# Patient Record
Sex: FEMALE | Race: WHITE | NOT HISPANIC OR LATINO | Employment: OTHER | URBAN - NONMETROPOLITAN AREA
[De-identification: names, ages, dates, MRNs, and addresses within clinical notes are randomized per-mention and may not be internally consistent; named-entity substitution may affect disease eponyms.]

---

## 2017-08-15 RX ORDER — LORAZEPAM 1 MG/1
1 TABLET ORAL EVERY 8 HOURS PRN
COMMUNITY

## 2017-08-16 ENCOUNTER — OFFICE VISIT (OUTPATIENT)
Dept: GASTROENTEROLOGY | Facility: CLINIC | Age: 67
End: 2017-08-16

## 2017-08-16 VITALS
HEART RATE: 85 BPM | HEIGHT: 62 IN | TEMPERATURE: 98.8 F | SYSTOLIC BLOOD PRESSURE: 140 MMHG | WEIGHT: 160 LBS | BODY MASS INDEX: 29.44 KG/M2 | DIASTOLIC BLOOD PRESSURE: 84 MMHG

## 2017-08-16 DIAGNOSIS — Z80.0 FAMILY HISTORY OF COLON CANCER: ICD-10-CM

## 2017-08-16 DIAGNOSIS — K62.5 RECTAL BLEEDING: Primary | ICD-10-CM

## 2017-08-16 DIAGNOSIS — K63.5 COLON POLYPS: ICD-10-CM

## 2017-08-16 PROCEDURE — 99214 OFFICE O/P EST MOD 30 MIN: CPT | Performed by: NURSE PRACTITIONER

## 2017-08-16 RX ORDER — HYDROCODONE BITARTRATE AND ACETAMINOPHEN 7.5; 325 MG/1; MG/1
1 TABLET ORAL EVERY 6 HOURS PRN
COMMUNITY
End: 2021-03-26

## 2017-08-16 RX ORDER — CYCLOBENZAPRINE HCL 10 MG
10 TABLET ORAL 3 TIMES DAILY PRN
COMMUNITY

## 2017-08-16 RX ORDER — LISINOPRIL AND HYDROCHLOROTHIAZIDE 12.5; 1 MG/1; MG/1
1 TABLET ORAL EVERY MORNING
COMMUNITY
End: 2021-03-26

## 2017-08-16 RX ORDER — TRAMADOL HYDROCHLORIDE 50 MG/1
50 TABLET ORAL EVERY 6 HOURS PRN
COMMUNITY

## 2017-08-16 NOTE — PROGRESS NOTES
Chief Complaint   Patient presents with   • Rectal Bleeding     had rectal bleeding a few times       Subjective     HPI    Bright red blood observed on toilet paper 3 times over past year.  First time she was evaluated at [facility] in Aug 2016 (review of ER records indicate hgb of 8.3).  Most recently she observed bleeding 2 times over past month.  More diarrhea since cholecystectomy within past four to five years.  She denies  diarrhea or constipation.  No abdominal pain.      Positive family hx of CRC in paternal aunt and uncle (dx age of late 70s and 62)    Review of previous records:  Hx of appendectomy with wedge resection of cecum.  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  She underwent sigmoid resection in 2007 for mass and wide based polyp all showing adenomatous changes.  The sigmoid mass showed foci of high grade dysplasia.    History reviewed. No pertinent past medical history.    Past Surgical History:   Procedure Laterality Date   • CERVICAL LAMINECTOMY     • CHOLECYSTECTOMY     • COLONOSCOPY  10/01/2014    polyps 4/  diverticulosis/  internal hemorrhoids   • HYSTERECTOMY     • SIGMOIDOSCOPY      resection   • TUBAL ABDOMINAL LIGATION         Outpatient Prescriptions Marked as Taking for the 8/16/17 encounter (Office Visit) with NORBERT Parry   Medication Sig Dispense Refill   • cyclobenzaprine (FLEXERIL) 10 MG tablet Take 10 mg by mouth 3 (Three) Times a Day As Needed for Muscle Spasms.     • Diphenhydramine-APAP, sleep, (PAIN RELIEVER PM PO) Take  by mouth.     • HYDROcodone-acetaminophen (NORCO) 7.5-325 MG per tablet Take 1 tablet by mouth Every 6 (Six) Hours As Needed for Moderate Pain .     • lisinopril-hydrochlorothiazide (PRINZIDE,ZESTORETIC) 10-12.5 MG per tablet Take 1 tablet by mouth Daily.     • LORazepam (ATIVAN) 1 MG tablet Take 1 mg by mouth Every 8 (Eight) Hours As Needed for Anxiety.     • traMADol (ULTRAM) 50 MG tablet Take 50 mg by mouth Every 6  "(Six) Hours As Needed for Moderate Pain .         Allergies   Allergen Reactions   • Codeine        Social History     Social History   • Marital status:      Spouse name: N/A   • Number of children: N/A   • Years of education: N/A     Occupational History   • Not on file.     Social History Main Topics   • Smoking status: Current Every Day Smoker     Packs/day: 2.00     Years: 55.00   • Smokeless tobacco: Never Used   • Alcohol use No   • Drug use: No   • Sexual activity: Not on file     Other Topics Concern   • Not on file     Social History Narrative       Family History   Problem Relation Age of Onset   • Colon cancer Paternal Aunt    • Colon cancer Paternal Uncle    • Esophageal cancer Neg Hx        Review of Systems   Constitutional: Negative for fatigue, fever and unexpected weight change.   HENT: Negative for hearing loss, sore throat and voice change.    Eyes: Negative for visual disturbance.   Respiratory: Negative for cough, shortness of breath and wheezing.    Cardiovascular: Negative for chest pain and palpitations.   Gastrointestinal: Positive for blood in stool. Negative for abdominal pain and vomiting.   Endocrine: Negative for polydipsia and polyuria.   Genitourinary: Negative for difficulty urinating, dysuria, hematuria and urgency.   Musculoskeletal: Negative for joint swelling and myalgias.   Skin: Negative for color change, rash and wound.   Neurological: Negative for dizziness, tremors, seizures and syncope.   Hematological: Does not bruise/bleed easily.   Psychiatric/Behavioral: Negative for agitation and confusion. The patient is not nervous/anxious.        Objective     Vitals:    08/16/17 0821   BP: 140/84   Pulse: 85   Temp: 98.8 °F (37.1 °C)   Weight: 160 lb (72.6 kg)   Height: 62\" (157.5 cm)     Body mass index is 29.26 kg/(m^2).    Physical Exam   Constitutional: She is oriented to person, place, and time. She appears well-developed and well-nourished.   HENT:   Head: " Normocephalic and atraumatic.   Eyes: Conjunctivae are normal. Pupils are equal, round, and reactive to light. No scleral icterus.   Neck: No JVD present. No thyroid mass and no thyromegaly present.   Cardiovascular: Normal rate, regular rhythm and normal heart sounds.  Exam reveals no gallop and no friction rub.    No murmur heard.  Pulmonary/Chest: Effort normal and breath sounds normal. No accessory muscle usage. No respiratory distress. She has no wheezes. She has no rales.   Abdominal: Soft. Bowel sounds are normal. She exhibits no distension, no ascites and no mass. There is no splenomegaly or hepatomegaly. There is no tenderness. There is no rebound and no guarding.   Genitourinary:   Genitourinary Comments: Rectal-Did not examine   Musculoskeletal: Normal range of motion. She exhibits no edema.   Neurological: She is alert and oriented to person, place, and time.   Deemed a reliable historian, able to converse without difficulty and able to move all extremities without difficulty   Skin: Skin is warm and dry.   Psychiatric: She has a normal mood and affect. Her behavior is normal.       Imaging Results (most recent)     None          Assessment/Plan     Shalini was seen today for rectal bleeding.    Diagnoses and all orders for this visit:    Rectal bleeding  -     Case Request; Standing  -     Case Request  -     Cancel: Case Request; Standing  -     Implement Anesthesia Orders Day of Procedure; Standing  -     Obtain Informed Consent; Standing  -     Cancel: Case Request    Colon polyps    Family history of colon cancer    Other orders  -     polyethylene glycol (GoLYTELY) 236 g solution; Take 3,785 mL by mouth 1 (One) Time for 1 dose. Take as directed  -     Implement Anesthesia Orders Day of Procedure; Standing  -     Obtain Informed Consent; Standing        COLONOSCOPY WITH ANESTHESIA (N/A)     Advised pt to stop ASA day prior to procedure and to stop use of NSAIDs, Fish Oil, and MV 5 days prior to  procedure.  Tylenol based products are ok to take.  Pt verbalized understanding.    All risks, benefits, alternatives, and indications of colonoscopy procedure have been discussed with the patient. Risks to include perforation of the colon requiring possible surgery or colostomy, risk of bleeding from biopsies or removal of colon tissue, possibility of missing a colon polyp or cancer, or adverse drug reaction.  Benefits to include the diagnosis and management of disease of the colon and rectum. Alternatives to include barium enema, radiographic evaluation, lab testing or no intervention. Pt verbalizes understanding and agrees to proceed with procedure.    During this visit I spent 3 minutes counseling patient on the importance of smoking cessation.  I advised the patient of the risks involved with the continued use of tobacco and provided the patient with smoking cessation education materials.        Patient Instructions   Steps to Quit Smoking   Smoking tobacco can be harmful to your health and can affect almost every organ in your body. Smoking puts you, and those around you, at risk for developing many serious chronic diseases. Quitting smoking is difficult, but it is one of the best things that you can do for your health. It is never too late to quit.  WHAT ARE THE BENEFITS OF QUITTING SMOKING?  When you quit smoking, you lower your risk of developing serious diseases and conditions, such as:  · Lung cancer or lung disease, such as COPD.  · Heart disease.  · Stroke.  · Heart attack.  · Infertility.  · Osteoporosis and bone fractures.  Additionally, symptoms such as coughing, wheezing, and shortness of breath may get better when you quit. You may also find that you get sick less often because your body is stronger at fighting off colds and infections. If you are pregnant, quitting smoking can help to reduce your chances of having a baby of low birth weight.  HOW DO I GET READY TO QUIT?  When you decide to quit  "smoking, create a plan to make sure that you are successful. Before you quit:  · Pick a date to quit. Set a date within the next two weeks to give you time to prepare.  · Write down the reasons why you are quitting. Keep this list in places where you will see it often, such as on your bathroom mirror or in your car or wallet.  · Identify the people, places, things, and activities that make you want to smoke (triggers) and avoid them. Make sure to take these actions:    Throw away all cigarettes at home, at work, and in your car.    Throw away smoking accessories, such as ashtrays and lighters.    Clean your car and make sure to empty the ashtray.    Clean your home, including curtains and carpets.  · Tell your family, friends, and coworkers that you are quitting. Support from your loved ones can make quitting easier.  · Talk with your health care provider about your options for quitting smoking.  · Find out what treatment options are covered by your health insurance.  WHAT STRATEGIES CAN I USE TO QUIT SMOKING?   Talk with your healthcare provider about different strategies to quit smoking. Some strategies include:  · Quitting smoking altogether instead of gradually lessening how much you smoke over a period of time. Research shows that quitting \"cold turkey\" is more successful than gradually quitting.  · Attending in-person counseling to help you build problem-solving skills. You are more likely to have success in quitting if you attend several counseling sessions. Even short sessions of 10 minutes can be effective.  · Finding resources and support systems that can help you to quit smoking and remain smoke-free after you quit. These resources are most helpful when you use them often. They can include:    Online chats with a counselor.    Telephone quitlines.    Printed self-help materials.    Support groups or group counseling.    Text messaging programs.    Mobile phone applications.  · Taking medicines to help you " quit smoking. (If you are pregnant or breastfeeding, talk with your health care provider first.) Some medicines contain nicotine and some do not. Both types of medicines help with cravings, but the medicines that include nicotine help to relieve withdrawal symptoms. Your health care provider may recommend:    Nicotine patches, gum, or lozenges.    Nicotine inhalers or sprays.    Non-nicotine medicine that is taken by mouth.  Talk with your health care provider about combining strategies, such as taking medicines while you are also receiving in-person counseling. Using these two strategies together makes you more likely to succeed in quitting than if you used either strategy on its own.  If you are pregnant or breastfeeding, talk with your health care provider about finding counseling or other support strategies to quit smoking. Do not take medicine to help you quit smoking unless told to do so by your health care provider.  WHAT THINGS CAN I DO TO MAKE IT EASIER TO QUIT?  Quitting smoking might feel overwhelming at first, but there is a lot that you can do to make it easier. Take these important actions:  · Reach out to your family and friends and ask that they support and encourage you during this time. Call telephone quitlines, reach out to support groups, or work with a counselor for support.  · Ask people who smoke to avoid smoking around you.  · Avoid places that trigger you to smoke, such as bars, parties, or smoke-break areas at work.  · Spend time around people who do not smoke.  · Lessen stress in your life, because stress can be a smoking trigger for some people. To lessen stress, try:    Exercising regularly.    Deep-breathing exercises.    Yoga.    Meditating.    Performing a body scan. This involves closing your eyes, scanning your body from head to toe, and noticing which parts of your body are particularly tense. Purposefully relax the muscles in those areas.  · Download or purchase mobile phone or  tablet apps (applications) that can help you stick to your quit plan by providing reminders, tips, and encouragement. There are many free apps, such as QuitGuide from the CDC (Centers for Disease Control and Prevention). You can find other support for quitting smoking (smoking cessation) through smokefree.gov and other websites.  HOW WILL I FEEL WHEN I QUIT SMOKING?  Within the first 24 hours of quitting smoking, you may start to feel some withdrawal symptoms. These symptoms are usually most noticeable 2-3 days after quitting, but they usually do not last beyond 2-3 weeks. Changes or symptoms that you might experience include:  · Mood swings.  · Restlessness, anxiety, or irritation.  · Difficulty concentrating.  · Dizziness.  · Strong cravings for sugary foods in addition to nicotine.  · Mild weight gain.  · Constipation.  · Nausea.  · Coughing or a sore throat.  · Changes in how your medicines work in your body.  · A depressed mood.  · Difficulty sleeping (insomnia).  After the first 2-3 weeks of quitting, you may start to notice more positive results, such as:  · Improved sense of smell and taste.  · Decreased coughing and sore throat.  · Slower heart rate.  · Lower blood pressure.  · Clearer skin.  · The ability to breathe more easily.  · Fewer sick days.  Quitting smoking is very challenging for most people. Do not get discouraged if you are not successful the first time. Some people need to make many attempts to quit before they achieve long-term success. Do your best to stick to your quit plan, and talk with your health care provider if you have any questions or concerns.     This information is not intended to replace advice given to you by your health care provider. Make sure you discuss any questions you have with your health care provider.     Document Released: 12/12/2002 Document Revised: 05/03/2016 Document Reviewed: 05/03/2016  [employer] Interactive Patient Education ©2017 [employer]

## 2017-08-16 NOTE — PATIENT INSTRUCTIONS
Steps to Quit Smoking   Smoking tobacco can be harmful to your health and can affect almost every organ in your body. Smoking puts you, and those around you, at risk for developing many serious chronic diseases. Quitting smoking is difficult, but it is one of the best things that you can do for your health. It is never too late to quit.  WHAT ARE THE BENEFITS OF QUITTING SMOKING?  When you quit smoking, you lower your risk of developing serious diseases and conditions, such as:  · Lung cancer or lung disease, such as COPD.  · Heart disease.  · Stroke.  · Heart attack.  · Infertility.  · Osteoporosis and bone fractures.  Additionally, symptoms such as coughing, wheezing, and shortness of breath may get better when you quit. You may also find that you get sick less often because your body is stronger at fighting off colds and infections. If you are pregnant, quitting smoking can help to reduce your chances of having a baby of low birth weight.  HOW DO I GET READY TO QUIT?  When you decide to quit smoking, create a plan to make sure that you are successful. Before you quit:  · Pick a date to quit. Set a date within the next two weeks to give you time to prepare.  · Write down the reasons why you are quitting. Keep this list in places where you will see it often, such as on your bathroom mirror or in your car or wallet.  · Identify the people, places, things, and activities that make you want to smoke (triggers) and avoid them. Make sure to take these actions:    Throw away all cigarettes at home, at work, and in your car.    Throw away smoking accessories, such as ashtrays and lighters.    Clean your car and make sure to empty the ashtray.    Clean your home, including curtains and carpets.  · Tell your family, friends, and coworkers that you are quitting. Support from your loved ones can make quitting easier.  · Talk with your health care provider about your options for quitting smoking.  · Find out what treatment  "options are covered by your health insurance.  WHAT STRATEGIES CAN I USE TO QUIT SMOKING?   Talk with your healthcare provider about different strategies to quit smoking. Some strategies include:  · Quitting smoking altogether instead of gradually lessening how much you smoke over a period of time. Research shows that quitting \"cold turkey\" is more successful than gradually quitting.  · Attending in-person counseling to help you build problem-solving skills. You are more likely to have success in quitting if you attend several counseling sessions. Even short sessions of 10 minutes can be effective.  · Finding resources and support systems that can help you to quit smoking and remain smoke-free after you quit. These resources are most helpful when you use them often. They can include:    Online chats with a counselor.    Telephone quitlines.    Printed self-help materials.    Support groups or group counseling.    Text messaging programs.    Mobile phone applications.  · Taking medicines to help you quit smoking. (If you are pregnant or breastfeeding, talk with your health care provider first.) Some medicines contain nicotine and some do not. Both types of medicines help with cravings, but the medicines that include nicotine help to relieve withdrawal symptoms. Your health care provider may recommend:    Nicotine patches, gum, or lozenges.    Nicotine inhalers or sprays.    Non-nicotine medicine that is taken by mouth.  Talk with your health care provider about combining strategies, such as taking medicines while you are also receiving in-person counseling. Using these two strategies together makes you more likely to succeed in quitting than if you used either strategy on its own.  If you are pregnant or breastfeeding, talk with your health care provider about finding counseling or other support strategies to quit smoking. Do not take medicine to help you quit smoking unless told to do so by your health care " provider.  WHAT THINGS CAN I DO TO MAKE IT EASIER TO QUIT?  Quitting smoking might feel overwhelming at first, but there is a lot that you can do to make it easier. Take these important actions:  · Reach out to your family and friends and ask that they support and encourage you during this time. Call telephone quitlines, reach out to support groups, or work with a counselor for support.  · Ask people who smoke to avoid smoking around you.  · Avoid places that trigger you to smoke, such as bars, parties, or smoke-break areas at work.  · Spend time around people who do not smoke.  · Lessen stress in your life, because stress can be a smoking trigger for some people. To lessen stress, try:    Exercising regularly.    Deep-breathing exercises.    Yoga.    Meditating.    Performing a body scan. This involves closing your eyes, scanning your body from head to toe, and noticing which parts of your body are particularly tense. Purposefully relax the muscles in those areas.  · Download or purchase mobile phone or tablet apps (applications) that can help you stick to your quit plan by providing reminders, tips, and encouragement. There are many free apps, such as QuitGuide from the CDC (Centers for Disease Control and Prevention). You can find other support for quitting smoking (smoking cessation) through smokefree.gov and other websites.  HOW WILL I FEEL WHEN I QUIT SMOKING?  Within the first 24 hours of quitting smoking, you may start to feel some withdrawal symptoms. These symptoms are usually most noticeable 2-3 days after quitting, but they usually do not last beyond 2-3 weeks. Changes or symptoms that you might experience include:  · Mood swings.  · Restlessness, anxiety, or irritation.  · Difficulty concentrating.  · Dizziness.  · Strong cravings for sugary foods in addition to nicotine.  · Mild weight gain.  · Constipation.  · Nausea.  · Coughing or a sore throat.  · Changes in how your medicines work in your  body.  · A depressed mood.  · Difficulty sleeping (insomnia).  After the first 2-3 weeks of quitting, you may start to notice more positive results, such as:  · Improved sense of smell and taste.  · Decreased coughing and sore throat.  · Slower heart rate.  · Lower blood pressure.  · Clearer skin.  · The ability to breathe more easily.  · Fewer sick days.  Quitting smoking is very challenging for most people. Do not get discouraged if you are not successful the first time. Some people need to make many attempts to quit before they achieve long-term success. Do your best to stick to your quit plan, and talk with your health care provider if you have any questions or concerns.     This information is not intended to replace advice given to you by your health care provider. Make sure you discuss any questions you have with your health care provider.     Document Released: 12/12/2002 Document Revised: 05/03/2016 Document Reviewed: 05/03/2016  [employer] Interactive Patient Education ©2017 [employer]

## 2017-09-07 ENCOUNTER — TELEPHONE (OUTPATIENT)
Dept: GASTROENTEROLOGY | Facility: CLINIC | Age: 67
End: 2017-09-07

## 2017-09-07 ENCOUNTER — HOSPITAL ENCOUNTER (OUTPATIENT)
Facility: HOSPITAL | Age: 67
Setting detail: HOSPITAL OUTPATIENT SURGERY
Discharge: HOME | End: 2017-09-07
Attending: INTERNAL MEDICINE | Admitting: INTERNAL MEDICINE

## 2017-09-07 ENCOUNTER — ANESTHESIA EVENT (OUTPATIENT)
Dept: GASTROENTEROLOGY | Facility: HOSPITAL | Age: 67
End: 2017-09-07

## 2017-09-07 ENCOUNTER — ANESTHESIA (OUTPATIENT)
Dept: GASTROENTEROLOGY | Facility: HOSPITAL | Age: 67
End: 2017-09-07

## 2017-09-07 VITALS
OXYGEN SATURATION: 99 % | WEIGHT: 157 LBS | TEMPERATURE: 98.3 F | SYSTOLIC BLOOD PRESSURE: 145 MMHG | DIASTOLIC BLOOD PRESSURE: 72 MMHG | HEART RATE: 93 BPM | RESPIRATION RATE: 16 BRPM | BODY MASS INDEX: 28.89 KG/M2 | HEIGHT: 62 IN

## 2017-09-07 PROCEDURE — 45385 COLONOSCOPY W/LESION REMOVAL: CPT | Performed by: INTERNAL MEDICINE

## 2017-09-07 PROCEDURE — 25010000002 PROPOFOL 10 MG/ML EMULSION: Performed by: NURSE ANESTHETIST, CERTIFIED REGISTERED

## 2017-09-07 RX ORDER — PROPOFOL 10 MG/ML
VIAL (ML) INTRAVENOUS AS NEEDED
Status: DISCONTINUED | OUTPATIENT
Start: 2017-09-07 | End: 2017-09-07 | Stop reason: SURG

## 2017-09-07 RX ORDER — SODIUM CHLORIDE 9 MG/ML
500 INJECTION, SOLUTION INTRAVENOUS CONTINUOUS PRN
Status: DISCONTINUED | OUTPATIENT
Start: 2017-09-07 | End: 2017-09-07 | Stop reason: HOSPADM

## 2017-09-07 RX ORDER — SODIUM CHLORIDE 0.9 % (FLUSH) 0.9 %
3 SYRINGE (ML) INJECTION AS NEEDED
Status: DISCONTINUED | OUTPATIENT
Start: 2017-09-07 | End: 2017-09-07 | Stop reason: HOSPADM

## 2017-09-07 RX ADMIN — SODIUM CHLORIDE 500 ML: 9 INJECTION, SOLUTION INTRAVENOUS at 10:49

## 2017-09-07 RX ADMIN — PROPOFOL 50 MG: 10 INJECTION, EMULSION INTRAVENOUS at 11:53

## 2017-09-07 RX ADMIN — PROPOFOL 50 MG: 10 INJECTION, EMULSION INTRAVENOUS at 11:44

## 2017-09-07 RX ADMIN — PROPOFOL 50 MG: 10 INJECTION, EMULSION INTRAVENOUS at 11:48

## 2017-09-07 RX ADMIN — PROPOFOL 20 MG: 10 INJECTION, EMULSION INTRAVENOUS at 11:49

## 2017-09-07 RX ADMIN — PROPOFOL 50 MG: 10 INJECTION, EMULSION INTRAVENOUS at 11:46

## 2017-09-07 RX ADMIN — PROPOFOL 30 MG: 10 INJECTION, EMULSION INTRAVENOUS at 11:45

## 2017-09-07 NOTE — ANESTHESIA PREPROCEDURE EVALUATION
Anesthesia Evaluation     Patient summary reviewed   history of anesthetic complications: PONV         Airway   Mallampati: I  TM distance: >3 FB  Neck ROM: full  no difficulty expected  Dental    (+) edentulous    Pulmonary    (+) a smoker (1.5 ppd, yes today),   Cardiovascular   Exercise tolerance: (Limited bynkne pain, denies chest pain and shortness of breath)    (+) hypertension,       Neuro/Psych  (+) psychiatric history Anxiety,    (-) seizures, TIA, CVA    ROS Comment: [place] palsy  GI/Hepatic/Renal/Endo    (-) liver disease, no renal disease, diabetes    Musculoskeletal     Abdominal    Substance History      OB/GYN          Other        ROS/Med Hx Other: Rectal bleeding                                  Anesthesia Plan    ASA 3     general   total IV anesthesia  intravenous induction   Anesthetic plan and risks discussed with patient.

## 2017-09-07 NOTE — H&P (VIEW-ONLY)
Chief Complaint   Patient presents with   • Rectal Bleeding     had rectal bleeding a few times       Subjective     HPI    Bright red blood observed on toilet paper 3 times over past year.  First time she was evaluated at [facility] in Aug 2016 (review of ER records indicate hgb of 8.3).  Most recently she observed bleeding 2 times over past month.  More diarrhea since cholecystectomy within past four to five years.  She denies  diarrhea or constipation.  No abdominal pain.      Positive family hx of CRC in paternal aunt and uncle (dx age of late 70s and 62)    Review of previous records:  Hx of appendectomy with wedge resection of cecum.  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  She underwent sigmoid resection in 2007 for mass and wide based polyp all showing adenomatous changes.  The sigmoid mass showed foci of high grade dysplasia.    History reviewed. No pertinent past medical history.    Past Surgical History:   Procedure Laterality Date   • CERVICAL LAMINECTOMY     • CHOLECYSTECTOMY     • COLONOSCOPY  10/01/2014    polyps 4/  diverticulosis/  internal hemorrhoids   • HYSTERECTOMY     • SIGMOIDOSCOPY      resection   • TUBAL ABDOMINAL LIGATION         Outpatient Prescriptions Marked as Taking for the 8/16/17 encounter (Office Visit) with NORBERT Parry   Medication Sig Dispense Refill   • cyclobenzaprine (FLEXERIL) 10 MG tablet Take 10 mg by mouth 3 (Three) Times a Day As Needed for Muscle Spasms.     • Diphenhydramine-APAP, sleep, (PAIN RELIEVER PM PO) Take  by mouth.     • HYDROcodone-acetaminophen (NORCO) 7.5-325 MG per tablet Take 1 tablet by mouth Every 6 (Six) Hours As Needed for Moderate Pain .     • lisinopril-hydrochlorothiazide (PRINZIDE,ZESTORETIC) 10-12.5 MG per tablet Take 1 tablet by mouth Daily.     • LORazepam (ATIVAN) 1 MG tablet Take 1 mg by mouth Every 8 (Eight) Hours As Needed for Anxiety.     • traMADol (ULTRAM) 50 MG tablet Take 50 mg by mouth Every 6  "(Six) Hours As Needed for Moderate Pain .         Allergies   Allergen Reactions   • Codeine        Social History     Social History   • Marital status:      Spouse name: N/A   • Number of children: N/A   • Years of education: N/A     Occupational History   • Not on file.     Social History Main Topics   • Smoking status: Current Every Day Smoker     Packs/day: 2.00     Years: 55.00   • Smokeless tobacco: Never Used   • Alcohol use No   • Drug use: No   • Sexual activity: Not on file     Other Topics Concern   • Not on file     Social History Narrative       Family History   Problem Relation Age of Onset   • Colon cancer Paternal Aunt    • Colon cancer Paternal Uncle    • Esophageal cancer Neg Hx        Review of Systems   Constitutional: Negative for fatigue, fever and unexpected weight change.   HENT: Negative for hearing loss, sore throat and voice change.    Eyes: Negative for visual disturbance.   Respiratory: Negative for cough, shortness of breath and wheezing.    Cardiovascular: Negative for chest pain and palpitations.   Gastrointestinal: Positive for blood in stool. Negative for abdominal pain and vomiting.   Endocrine: Negative for polydipsia and polyuria.   Genitourinary: Negative for difficulty urinating, dysuria, hematuria and urgency.   Musculoskeletal: Negative for joint swelling and myalgias.   Skin: Negative for color change, rash and wound.   Neurological: Negative for dizziness, tremors, seizures and syncope.   Hematological: Does not bruise/bleed easily.   Psychiatric/Behavioral: Negative for agitation and confusion. The patient is not nervous/anxious.        Objective     Vitals:    08/16/17 0821   BP: 140/84   Pulse: 85   Temp: 98.8 °F (37.1 °C)   Weight: 160 lb (72.6 kg)   Height: 62\" (157.5 cm)     Body mass index is 29.26 kg/(m^2).    Physical Exam   Constitutional: She is oriented to person, place, and time. She appears well-developed and well-nourished.   HENT:   Head: " Normocephalic and atraumatic.   Eyes: Conjunctivae are normal. Pupils are equal, round, and reactive to light. No scleral icterus.   Neck: No JVD present. No thyroid mass and no thyromegaly present.   Cardiovascular: Normal rate, regular rhythm and normal heart sounds.  Exam reveals no gallop and no friction rub.    No murmur heard.  Pulmonary/Chest: Effort normal and breath sounds normal. No accessory muscle usage. No respiratory distress. She has no wheezes. She has no rales.   Abdominal: Soft. Bowel sounds are normal. She exhibits no distension, no ascites and no mass. There is no splenomegaly or hepatomegaly. There is no tenderness. There is no rebound and no guarding.   Genitourinary:   Genitourinary Comments: Rectal-Did not examine   Musculoskeletal: Normal range of motion. She exhibits no edema.   Neurological: She is alert and oriented to person, place, and time.   Deemed a reliable historian, able to converse without difficulty and able to move all extremities without difficulty   Skin: Skin is warm and dry.   Psychiatric: She has a normal mood and affect. Her behavior is normal.       Imaging Results (most recent)     None          Assessment/Plan     Shalini was seen today for rectal bleeding.    Diagnoses and all orders for this visit:    Rectal bleeding  -     Case Request; Standing  -     Case Request  -     Cancel: Case Request; Standing  -     Implement Anesthesia Orders Day of Procedure; Standing  -     Obtain Informed Consent; Standing  -     Cancel: Case Request    Colon polyps    Family history of colon cancer    Other orders  -     polyethylene glycol (GoLYTELY) 236 g solution; Take 3,785 mL by mouth 1 (One) Time for 1 dose. Take as directed  -     Implement Anesthesia Orders Day of Procedure; Standing  -     Obtain Informed Consent; Standing        COLONOSCOPY WITH ANESTHESIA (N/A)     Advised pt to stop ASA day prior to procedure and to stop use of NSAIDs, Fish Oil, and MV 5 days prior to  procedure.  Tylenol based products are ok to take.  Pt verbalized understanding.    All risks, benefits, alternatives, and indications of colonoscopy procedure have been discussed with the patient. Risks to include perforation of the colon requiring possible surgery or colostomy, risk of bleeding from biopsies or removal of colon tissue, possibility of missing a colon polyp or cancer, or adverse drug reaction.  Benefits to include the diagnosis and management of disease of the colon and rectum. Alternatives to include barium enema, radiographic evaluation, lab testing or no intervention. Pt verbalizes understanding and agrees to proceed with procedure.    During this visit I spent 3 minutes counseling patient on the importance of smoking cessation.  I advised the patient of the risks involved with the continued use of tobacco and provided the patient with smoking cessation education materials.        Patient Instructions   Steps to Quit Smoking   Smoking tobacco can be harmful to your health and can affect almost every organ in your body. Smoking puts you, and those around you, at risk for developing many serious chronic diseases. Quitting smoking is difficult, but it is one of the best things that you can do for your health. It is never too late to quit.  WHAT ARE THE BENEFITS OF QUITTING SMOKING?  When you quit smoking, you lower your risk of developing serious diseases and conditions, such as:  · Lung cancer or lung disease, such as COPD.  · Heart disease.  · Stroke.  · Heart attack.  · Infertility.  · Osteoporosis and bone fractures.  Additionally, symptoms such as coughing, wheezing, and shortness of breath may get better when you quit. You may also find that you get sick less often because your body is stronger at fighting off colds and infections. If you are pregnant, quitting smoking can help to reduce your chances of having a baby of low birth weight.  HOW DO I GET READY TO QUIT?  When you decide to quit  "smoking, create a plan to make sure that you are successful. Before you quit:  · Pick a date to quit. Set a date within the next two weeks to give you time to prepare.  · Write down the reasons why you are quitting. Keep this list in places where you will see it often, such as on your bathroom mirror or in your car or wallet.  · Identify the people, places, things, and activities that make you want to smoke (triggers) and avoid them. Make sure to take these actions:    Throw away all cigarettes at home, at work, and in your car.    Throw away smoking accessories, such as ashtrays and lighters.    Clean your car and make sure to empty the ashtray.    Clean your home, including curtains and carpets.  · Tell your family, friends, and coworkers that you are quitting. Support from your loved ones can make quitting easier.  · Talk with your health care provider about your options for quitting smoking.  · Find out what treatment options are covered by your health insurance.  WHAT STRATEGIES CAN I USE TO QUIT SMOKING?   Talk with your healthcare provider about different strategies to quit smoking. Some strategies include:  · Quitting smoking altogether instead of gradually lessening how much you smoke over a period of time. Research shows that quitting \"cold turkey\" is more successful than gradually quitting.  · Attending in-person counseling to help you build problem-solving skills. You are more likely to have success in quitting if you attend several counseling sessions. Even short sessions of 10 minutes can be effective.  · Finding resources and support systems that can help you to quit smoking and remain smoke-free after you quit. These resources are most helpful when you use them often. They can include:    Online chats with a counselor.    Telephone quitlines.    Printed self-help materials.    Support groups or group counseling.    Text messaging programs.    Mobile phone applications.  · Taking medicines to help you " quit smoking. (If you are pregnant or breastfeeding, talk with your health care provider first.) Some medicines contain nicotine and some do not. Both types of medicines help with cravings, but the medicines that include nicotine help to relieve withdrawal symptoms. Your health care provider may recommend:    Nicotine patches, gum, or lozenges.    Nicotine inhalers or sprays.    Non-nicotine medicine that is taken by mouth.  Talk with your health care provider about combining strategies, such as taking medicines while you are also receiving in-person counseling. Using these two strategies together makes you more likely to succeed in quitting than if you used either strategy on its own.  If you are pregnant or breastfeeding, talk with your health care provider about finding counseling or other support strategies to quit smoking. Do not take medicine to help you quit smoking unless told to do so by your health care provider.  WHAT THINGS CAN I DO TO MAKE IT EASIER TO QUIT?  Quitting smoking might feel overwhelming at first, but there is a lot that you can do to make it easier. Take these important actions:  · Reach out to your family and friends and ask that they support and encourage you during this time. Call telephone quitlines, reach out to support groups, or work with a counselor for support.  · Ask people who smoke to avoid smoking around you.  · Avoid places that trigger you to smoke, such as bars, parties, or smoke-break areas at work.  · Spend time around people who do not smoke.  · Lessen stress in your life, because stress can be a smoking trigger for some people. To lessen stress, try:    Exercising regularly.    Deep-breathing exercises.    Yoga.    Meditating.    Performing a body scan. This involves closing your eyes, scanning your body from head to toe, and noticing which parts of your body are particularly tense. Purposefully relax the muscles in those areas.  · Download or purchase mobile phone or  tablet apps (applications) that can help you stick to your quit plan by providing reminders, tips, and encouragement. There are many free apps, such as QuitGuide from the CDC (Centers for Disease Control and Prevention). You can find other support for quitting smoking (smoking cessation) through smokefree.gov and other websites.  HOW WILL I FEEL WHEN I QUIT SMOKING?  Within the first 24 hours of quitting smoking, you may start to feel some withdrawal symptoms. These symptoms are usually most noticeable 2-3 days after quitting, but they usually do not last beyond 2-3 weeks. Changes or symptoms that you might experience include:  · Mood swings.  · Restlessness, anxiety, or irritation.  · Difficulty concentrating.  · Dizziness.  · Strong cravings for sugary foods in addition to nicotine.  · Mild weight gain.  · Constipation.  · Nausea.  · Coughing or a sore throat.  · Changes in how your medicines work in your body.  · A depressed mood.  · Difficulty sleeping (insomnia).  After the first 2-3 weeks of quitting, you may start to notice more positive results, such as:  · Improved sense of smell and taste.  · Decreased coughing and sore throat.  · Slower heart rate.  · Lower blood pressure.  · Clearer skin.  · The ability to breathe more easily.  · Fewer sick days.  Quitting smoking is very challenging for most people. Do not get discouraged if you are not successful the first time. Some people need to make many attempts to quit before they achieve long-term success. Do your best to stick to your quit plan, and talk with your health care provider if you have any questions or concerns.     This information is not intended to replace advice given to you by your health care provider. Make sure you discuss any questions you have with your health care provider.     Document Released: 12/12/2002 Document Revised: 05/03/2016 Document Reviewed: 05/03/2016  [employer] Interactive Patient Education ©2017 [employer]

## 2017-09-07 NOTE — PLAN OF CARE
Problem: Patient Care Overview (Adult)  Goal: Plan of Care Review  Outcome: Ongoing (interventions implemented as appropriate)    09/07/17 1147   Patient Care Overview   Progress improving   Outcome Evaluation   Outcome Summary/Follow up Plan pt tolerating procedure well         Problem: GI Endoscopy (Adult)  Goal: Signs and Symptoms of Listed Potential Problems Will be Absent or Manageable (GI Endoscopy)  Outcome: Ongoing (interventions implemented as appropriate)    09/07/17 1147   GI Endoscopy   Problems Assessed (GI Endoscopy) all   Problems Present (GI Endoscopy) none

## 2017-09-07 NOTE — PLAN OF CARE
Problem: Patient Care Overview (Adult)  Goal: Plan of Care Review  Outcome: Outcome(s) achieved Date Met:  09/07/17 09/07/17 1213   Patient Care Overview   Progress improving   Outcome Evaluation   Outcome Summary/Follow up Plan D/C CRITERIA MET   Coping/Psychosocial Response Interventions   Plan Of Care Reviewed With patient;spouse

## 2017-09-07 NOTE — ANESTHESIA POSTPROCEDURE EVALUATION
Patient: Shalini Carty    Procedure Summary     Date Anesthesia Start Anesthesia Stop Room / Location    09/07/17 1142 1157  [facility] /  [facility]       Procedure Diagnosis Surgeon Provider    COLONOSCOPY WITH ANESTHESIA (N/A ) Rectal bleeding  (Rectal bleeding [K62.5]) Dario Cordero, DO Deep Cano, LEONARDO          Anesthesia Type: general  Last vitals  BP        Temp        Pulse       Resp        SpO2          Post Anesthesia Care and Evaluation    Patient location during evaluation: PHASE II  Patient participation: complete - patient participated  Level of consciousness: awake and alert  Pain score: 0  Pain management: adequate  Airway patency: patent  Anesthetic complications: No anesthetic complications  PONV Status: none  Cardiovascular status: acceptable and stable  Respiratory status: acceptable and unassisted  Hydration status: acceptable

## 2018-07-04 ENCOUNTER — APPOINTMENT (OUTPATIENT)
Dept: GENERAL RADIOLOGY | Age: 68
End: 2018-07-04
Payer: MEDICARE

## 2018-07-04 ENCOUNTER — HOSPITAL ENCOUNTER (EMERGENCY)
Age: 68
Discharge: HOME | End: 2018-07-04
Payer: MEDICARE

## 2018-07-04 VITALS
SYSTOLIC BLOOD PRESSURE: 100 MMHG | OXYGEN SATURATION: 98 % | HEART RATE: 84 BPM | RESPIRATION RATE: 14 BRPM | TEMPERATURE: 98.2 F | DIASTOLIC BLOOD PRESSURE: 62 MMHG

## 2018-07-04 DIAGNOSIS — M62.838 SPASM OF MUSCLE: Primary | ICD-10-CM

## 2018-07-04 PROCEDURE — 99283 EMERGENCY DEPT VISIT LOW MDM: CPT | Performed by: NURSE PRACTITIONER

## 2018-07-04 PROCEDURE — 93971 EXTREMITY STUDY: CPT

## 2018-07-04 PROCEDURE — 99283 EMERGENCY DEPT VISIT LOW MDM: CPT

## 2018-07-04 PROCEDURE — 73560 X-RAY EXAM OF KNEE 1 OR 2: CPT

## 2018-07-04 RX ORDER — HYDROCODONE BITARTRATE AND ACETAMINOPHEN 5; 325 MG/1; MG/1
1 TABLET ORAL EVERY 6 HOURS PRN
Qty: 15 TABLET | Refills: 0 | Status: SHIPPED | OUTPATIENT
Start: 2018-07-04 | End: 2018-07-11

## 2018-07-04 RX ORDER — CYCLOBENZAPRINE HCL 10 MG
10 TABLET ORAL 3 TIMES DAILY PRN
COMMUNITY
End: 2019-06-04

## 2018-07-04 RX ORDER — TRIAMCINOLONE ACETONIDE 40 MG/ML
40 INJECTION, SUSPENSION INTRA-ARTICULAR; INTRAMUSCULAR ONCE
Status: DISCONTINUED | OUTPATIENT
Start: 2018-07-04 | End: 2018-07-04

## 2018-07-04 ASSESSMENT — ENCOUNTER SYMPTOMS
SHORTNESS OF BREATH: 0
ABDOMINAL PAIN: 0

## 2018-07-04 ASSESSMENT — PAIN SCALES - GENERAL: PAINLEVEL_OUTOF10: 6

## 2018-07-04 NOTE — ED PROVIDER NOTES
[facility]  eMERGENCY dEPARTMENT eNCOUnter      Pt Name: Kellee Barry  MRN: [id number]  Fabiangfjoseph 1950  Date of evaluation: 7/4/2018  Provider: Gerhardt Reels, [address]       Chief Complaint   Patient presents with    Leg Pain     reports persistent right leg pain and burning         HISTORY OF PRESENT ILLNESS   (Location/Symptom, Timing/Onset, Context/Setting, Quality, Duration, Modifying Factors, Severity)  Note limiting factors. Kellee Barry is a 75 y.o. female who presents to the emergency department with r leg pain x months. HAd her knee replaced by Dr Erwin Gregory almost 2 years ago. Has tightness to her r thigh and calf that comes and goes. Does not know anything that causes it or relieves it. Has been going on for 2 months. Dr Josemanuel Arias gave her muscle relaxers and ultram that do not help. Also takes ativan     The history is provided by the patient. Leg Pain   This is a chronic problem. The current episode started more than 1 week ago. The problem occurs daily. The problem has been gradually worsening. Pertinent negatives include no chest pain, no abdominal pain and no shortness of breath. Nothing aggravates the symptoms. Nothing relieves the symptoms. Nursing Notes were reviewed. REVIEW OF SYSTEMS    (2-9 systems for level 4, 10 or more for level 5)     Review of Systems   Respiratory: Negative for shortness of breath. Cardiovascular: Negative for chest pain. Gastrointestinal: Negative for abdominal pain. Except as noted above the remainder of the review of systems was reviewed and negative.        PAST MEDICAL HISTORY     Past Medical History:   Diagnosis Date    Anxiety     Back pain of lumbar region with sciatica     Hyperlipidemia     Hypertension     Panic attack     PONV (postoperative nausea and vomiting)          SURGICAL HISTORY       Past Surgical History:   Procedure Laterality Date    APPENDECTOMY      BACK SURGERY      CERVICAL     BACK SURGERY      LUMBAR RUPTURED DISC    CHOLECYSTECTOMY      COLECTOMY      BLOCKAGE    HYSTERECTOMY      JOINT REPLACEMENT      KNEE ARTHROSCOPY Right     TOTAL KNEE ARTHROPLASTY Right 8/26/2016    KNEE TOTAL ARTHROPLASTY performed by Aleta Merchant DO at [address]       Previous Medications    ACETAMINOPHEN (TYLENOL) 500 MG TABLET    Take 500 mg by mouth 3 times daily Indications: PAIN    ASPIRIN BUF,[id number], (BUFFERED ASPIRIN) 325 MG TABS    Take 1 tablet by mouth daily    CYCLOBENZAPRINE (FLEXERIL) 10 MG TABLET    Take 10 mg by mouth 3 times daily as needed for Muscle spasms    LISINOPRIL-HYDROCHLOROTHIAZIDE (PRINZIDE;ZESTORETIC) 10-12.5 MG PER TABLET    Take 1 tablet by mouth daily Indications: HTN    LORAZEPAM (ATIVAN) 1 MG TABLET    Take 1 mg by mouth 2 times daily Indications: ANXIETY     TIZANIDINE (ZANAFLEX) 4 MG TABLET    Take 4 mg by mouth every 8 hours as needed Indications: MUSCLE RELAXER        ALLERGIES     Naproxen    FAMILY HISTORY       Family History   Problem Relation Age of Onset    Diabetes Mother     Heart Disease Father     High Blood Pressure Father           SOCIAL HISTORY       Social History     Social History    Marital status:      Spouse name: N/A    Number of children: N/A    Years of education: N/A     Social History Main Topics    Smoking status: Current Every Day Smoker     Packs/day: 2.00     Years: 54.00     Types: Cigarettes    Smokeless tobacco: None    Alcohol use No    Drug use: No    Sexual activity: Not Asked     Other Topics Concern    None     Social History Narrative    None       SCREENINGS             PHYSICAL EXAM    (up to 7 for level 4, 8 or more for level 5)     ED Triage Vitals [07/04/18 0721]   BP Temp Temp src Pulse Resp SpO2 Height Weight   (!) 155/54 98 °F (36.7 °C) -- 100 -- 93 % -- --       Physical Exam   Constitutional: She appears well-developed and well-nourished.    HENT:   Head: describe any relief with rest.  She declines labs now. She will follow-up with Dr. Elias Vazquez soon. So she needs something for pain until she can do that      CONSULTS:  None    PROCEDURES:  Unless otherwise noted below, none     Procedures    FINAL IMPRESSION      1. Spasm of muscle          DISPOSITION/PLAN   DISPOSITION Decision To Discharge 07/04/2018 10:14:54 AM      PATIENT REFERRED TO:  Agapito Virk MD  [address] [address]  [phone]            DISCHARGE MEDICATIONS:  New Prescriptions    HYDROCODONE-ACETAMINOPHEN (LORTAB) 5-325 MG PER TABLET    Take 1 tablet by mouth every 6 hours as needed for Pain for up to 7 days. .     Attestation: The Prescription Monitoring Report for this patient was reviewed today. MANDIE Spivey)  Documentation: No signs of potential drug abuse or diversion identified. ([id number]  takes ativan ultram and some lortabs.   All given by dr Caesar Beckford) MANDIE Spivey)    (Please note that portions of this note were completed with a voice recognition program.  Efforts were made to edit the dictations but occasionally words are mis-transcribed.)    MANDIE Spivey (electronically signed)         MANDIE Spivey  07/04/18 [facility], MANDIE  07/04/18 1114

## 2018-07-04 NOTE — ED NOTES
ASSESSMENT:    PT ALERT/ORIENTED X4. PUPILS EQUAL/REACTIVE    SKIN:  WARM/DRY PINK CAPILLARY REFILL < 2SECS    CARDIAC:  S1 S2 NOTED     LUNGS: CLEAR UPPER AND LOWER LOBES, RESPIRATIONS EVEN/UNLABORED     ABDOMEN: BOWEL SOUNDS NOTED UPPER AND LOWER QUADRANTS                     SOFT AND NONTENDER. EXTREMITIES:  BILATERAL DP AND PT AND NO EDEMA NOTED, REPORTS PAIN AND BURNING IN RIGHT LEG    NO DISTRESS NOTED. SIDE RAILS UP AND CALL LIGHT IN REACH.      Arlean Baumgarten, RN  07/04/18 9318

## 2018-08-22 ENCOUNTER — HOSPITAL ENCOUNTER (OUTPATIENT)
Dept: MRI IMAGING | Age: 68
Discharge: HOME | End: 2018-08-22
Payer: MEDICARE

## 2018-08-22 DIAGNOSIS — M54.5 LOW BACK PAIN, UNSPECIFIED BACK PAIN LATERALITY, UNSPECIFIED CHRONICITY, WITH SCIATICA PRESENCE UNSPECIFIED: ICD-10-CM

## 2018-08-22 LAB
GFR NON-AFRICAN AMERICAN: >60
PERFORMED ON: NORMAL
POC CREATININE: 0.9 MG/DL (ref 0.3–1.3)
POC SAMPLE TYPE: NORMAL

## 2018-08-22 PROCEDURE — A9577 INJ MULTIHANCE: HCPCS | Performed by: FAMILY MEDICINE

## 2018-08-22 PROCEDURE — 82565 ASSAY OF CREATININE: CPT

## 2018-08-22 PROCEDURE — 72158 MRI LUMBAR SPINE W/O & W/DYE: CPT

## 2018-08-22 PROCEDURE — 6360000004 HC RX CONTRAST MEDICATION: Performed by: FAMILY MEDICINE

## 2018-08-22 RX ADMIN — GADOBENATE DIMEGLUMINE 15 ML: 529 INJECTION, SOLUTION INTRAVENOUS at 18:41

## 2019-01-31 ENCOUNTER — HOSPITAL ENCOUNTER (EMERGENCY)
Age: 69
Discharge: HOME | End: 2019-01-31
Payer: MEDICARE

## 2019-01-31 ENCOUNTER — APPOINTMENT (OUTPATIENT)
Dept: GENERAL RADIOLOGY | Age: 69
End: 2019-01-31
Payer: MEDICARE

## 2019-01-31 VITALS
WEIGHT: 163 LBS | OXYGEN SATURATION: 98 % | SYSTOLIC BLOOD PRESSURE: 146 MMHG | DIASTOLIC BLOOD PRESSURE: 74 MMHG | HEART RATE: 92 BPM | RESPIRATION RATE: 16 BRPM | BODY MASS INDEX: 30 KG/M2 | HEIGHT: 62 IN | TEMPERATURE: 98.3 F

## 2019-01-31 DIAGNOSIS — J40 BRONCHITIS: Primary | ICD-10-CM

## 2019-01-31 DIAGNOSIS — N30.00 ACUTE CYSTITIS WITHOUT HEMATURIA: ICD-10-CM

## 2019-01-31 DIAGNOSIS — G89.29 CHRONIC PAIN OF RIGHT KNEE: ICD-10-CM

## 2019-01-31 DIAGNOSIS — M25.561 CHRONIC PAIN OF RIGHT KNEE: ICD-10-CM

## 2019-01-31 LAB
ACETAMINOPHEN LEVEL: <15 UG/ML
ALBUMIN SERPL-MCNC: 4.7 G/DL (ref 3.5–5.2)
ALP BLD-CCNC: 73 U/L (ref 35–104)
ALT SERPL-CCNC: 13 U/L (ref 5–33)
ANION GAP SERPL CALCULATED.3IONS-SCNC: 15 MMOL/L (ref 7–19)
APTT: 30.9 SEC (ref 26–36.2)
AST SERPL-CCNC: 14 U/L (ref 5–32)
BACTERIA: ABNORMAL /HPF
BASOPHILS ABSOLUTE: 0.1 K/UL (ref 0–0.2)
BASOPHILS RELATIVE PERCENT: 0.6 % (ref 0–1)
BILIRUB SERPL-MCNC: <0.2 MG/DL (ref 0.2–1.2)
BILIRUBIN URINE: NEGATIVE
BLOOD, URINE: NEGATIVE
BUN BLDV-MCNC: 19 MG/DL (ref 8–23)
CALCIUM SERPL-MCNC: 9.6 MG/DL (ref 8.8–10.2)
CHLORIDE BLD-SCNC: 100 MMOL/L (ref 98–111)
CLARITY: CLEAR
CO2: 25 MMOL/L (ref 22–29)
COLOR: YELLOW
CREAT SERPL-MCNC: 0.8 MG/DL (ref 0.5–0.9)
EOSINOPHILS ABSOLUTE: 0.3 K/UL (ref 0–0.6)
EOSINOPHILS RELATIVE PERCENT: 2.9 % (ref 0–5)
EPITHELIAL CELLS, UA: 2 /HPF (ref 0–5)
GFR NON-AFRICAN AMERICAN: >60
GLUCOSE BLD-MCNC: 99 MG/DL (ref 74–109)
GLUCOSE URINE: NEGATIVE MG/DL
HCT VFR BLD CALC: 34.7 % (ref 37–47)
HEMOGLOBIN: 10.6 G/DL (ref 12–16)
HYALINE CASTS: 3 /HPF (ref 0–8)
INR BLD: 1.05 (ref 0.88–1.18)
KETONES, URINE: NEGATIVE MG/DL
LEUKOCYTE ESTERASE, URINE: ABNORMAL
LIPASE: 22 U/L (ref 13–60)
LYMPHOCYTES ABSOLUTE: 3.3 K/UL (ref 1.1–4.5)
LYMPHOCYTES RELATIVE PERCENT: 33 % (ref 20–40)
MCH RBC QN AUTO: 27.4 PG (ref 27–31)
MCHC RBC AUTO-ENTMCNC: 30.5 G/DL (ref 33–37)
MCV RBC AUTO: 89.7 FL (ref 81–99)
MONOCYTES ABSOLUTE: 0.5 K/UL (ref 0–0.9)
MONOCYTES RELATIVE PERCENT: 4.7 % (ref 0–10)
NEUTROPHILS ABSOLUTE: 5.8 K/UL (ref 1.5–7.5)
NEUTROPHILS RELATIVE PERCENT: 58.3 % (ref 50–65)
NITRITE, URINE: POSITIVE
PDW BLD-RTO: 14.3 % (ref 11.5–14.5)
PH UA: 6
PLATELET # BLD: 285 K/UL (ref 130–400)
PMV BLD AUTO: 9.2 FL (ref 9.4–12.3)
POTASSIUM REFLEX MAGNESIUM: 4.5 MMOL/L (ref 3.5–5)
PRO-BNP: 64 PG/ML (ref 0–900)
PROTEIN UA: NEGATIVE MG/DL
PROTHROMBIN TIME: 13.1 SEC (ref 12–14.6)
RAPID INFLUENZA  B AGN: NEGATIVE
RAPID INFLUENZA A AGN: NEGATIVE
RBC # BLD: 3.87 M/UL (ref 4.2–5.4)
RBC UA: 0 /HPF (ref 0–4)
S PYO AG THROAT QL: NEGATIVE
SODIUM BLD-SCNC: 140 MMOL/L (ref 136–145)
SPECIFIC GRAVITY UA: 1.02
TOTAL PROTEIN: 8.2 G/DL (ref 6.6–8.7)
TROPONIN: <0.01 NG/ML (ref 0–0.03)
URINE REFLEX TO CULTURE: YES
UROBILINOGEN, URINE: 0.2 E.U./DL
WBC # BLD: 9.9 K/UL (ref 4.8–10.8)
WBC UA: 13 /HPF (ref 0–5)

## 2019-01-31 PROCEDURE — 99283 EMERGENCY DEPT VISIT LOW MDM: CPT

## 2019-01-31 PROCEDURE — 87880 STREP A ASSAY W/OPTIC: CPT

## 2019-01-31 PROCEDURE — 96366 THER/PROPH/DIAG IV INF ADDON: CPT

## 2019-01-31 PROCEDURE — 83880 ASSAY OF NATRIURETIC PEPTIDE: CPT

## 2019-01-31 PROCEDURE — 96365 THER/PROPH/DIAG IV INF INIT: CPT

## 2019-01-31 PROCEDURE — 87804 INFLUENZA ASSAY W/OPTIC: CPT

## 2019-01-31 PROCEDURE — 84484 ASSAY OF TROPONIN QUANT: CPT

## 2019-01-31 PROCEDURE — 83690 ASSAY OF LIPASE: CPT

## 2019-01-31 PROCEDURE — 85730 THROMBOPLASTIN TIME PARTIAL: CPT

## 2019-01-31 PROCEDURE — 80053 COMPREHEN METABOLIC PANEL: CPT

## 2019-01-31 PROCEDURE — 81001 URINALYSIS AUTO W/SCOPE: CPT

## 2019-01-31 PROCEDURE — 6360000002 HC RX W HCPCS: Performed by: NURSE PRACTITIONER

## 2019-01-31 PROCEDURE — 6370000000 HC RX 637 (ALT 250 FOR IP): Performed by: NURSE PRACTITIONER

## 2019-01-31 PROCEDURE — 36415 COLL VENOUS BLD VENIPUNCTURE: CPT

## 2019-01-31 PROCEDURE — 85610 PROTHROMBIN TIME: CPT

## 2019-01-31 PROCEDURE — 87186 SC STD MICRODIL/AGAR DIL: CPT

## 2019-01-31 PROCEDURE — 99283 EMERGENCY DEPT VISIT LOW MDM: CPT | Performed by: NURSE PRACTITIONER

## 2019-01-31 PROCEDURE — 2580000003 HC RX 258: Performed by: NURSE PRACTITIONER

## 2019-01-31 PROCEDURE — G0480 DRUG TEST DEF 1-7 CLASSES: HCPCS

## 2019-01-31 PROCEDURE — 93005 ELECTROCARDIOGRAM TRACING: CPT

## 2019-01-31 PROCEDURE — 71046 X-RAY EXAM CHEST 2 VIEWS: CPT

## 2019-01-31 PROCEDURE — 87081 CULTURE SCREEN ONLY: CPT

## 2019-01-31 PROCEDURE — 87086 URINE CULTURE/COLONY COUNT: CPT

## 2019-01-31 PROCEDURE — 85025 COMPLETE CBC W/AUTO DIFF WBC: CPT

## 2019-01-31 RX ORDER — 0.9 % SODIUM CHLORIDE 0.9 %
1000 INTRAVENOUS SOLUTION INTRAVENOUS ONCE
Status: COMPLETED | OUTPATIENT
Start: 2019-01-31 | End: 2019-01-31

## 2019-01-31 RX ORDER — HYDROCODONE BITARTRATE AND ACETAMINOPHEN 5; 325 MG/1; MG/1
2 TABLET ORAL ONCE
Status: COMPLETED | OUTPATIENT
Start: 2019-01-31 | End: 2019-01-31

## 2019-01-31 RX ORDER — HYDROCODONE BITARTRATE AND ACETAMINOPHEN 5; 325 MG/1; MG/1
1 TABLET ORAL EVERY 6 HOURS PRN
Qty: 12 TABLET | Refills: 0 | Status: SHIPPED | OUTPATIENT
Start: 2019-01-31 | End: 2019-02-03

## 2019-01-31 RX ORDER — CEFDINIR 300 MG/1
300 CAPSULE ORAL 2 TIMES DAILY
Qty: 14 CAPSULE | Refills: 0 | Status: SHIPPED | OUTPATIENT
Start: 2019-01-31 | End: 2019-02-07

## 2019-01-31 RX ORDER — GUAIFENESIN 600 MG/1
600 TABLET, EXTENDED RELEASE ORAL 2 TIMES DAILY
Qty: 30 TABLET | Refills: 0 | Status: SHIPPED | OUTPATIENT
Start: 2019-01-31 | End: 2019-02-15

## 2019-01-31 RX ORDER — HYDROCODONE BITARTRATE AND ACETAMINOPHEN 7.5; 325 MG/1; MG/1
1 TABLET ORAL EVERY 6 HOURS PRN
Status: ON HOLD | COMMUNITY
End: 2020-11-09 | Stop reason: SDUPTHER

## 2019-01-31 RX ADMIN — CEFTRIAXONE 1 G: 1 INJECTION, POWDER, FOR SOLUTION INTRAMUSCULAR; INTRAVENOUS at 10:55

## 2019-01-31 RX ADMIN — SODIUM CHLORIDE 1000 ML: 9 INJECTION, SOLUTION INTRAVENOUS at 08:56

## 2019-01-31 RX ADMIN — Medication 5 ML: at 10:55

## 2019-01-31 RX ADMIN — HYDROCODONE BITARTRATE AND ACETAMINOPHEN 2 TABLET: 5; 325 TABLET ORAL at 08:51

## 2019-01-31 ASSESSMENT — PAIN SCALES - GENERAL
PAINLEVEL_OUTOF10: 3
PAINLEVEL_OUTOF10: 3

## 2019-02-01 LAB
EKG P AXIS: 51 DEGREES
EKG P-R INTERVAL: 150 MS
EKG Q-T INTERVAL: 400 MS
EKG QRS DURATION: 84 MS
EKG QTC CALCULATION (BAZETT): 447 MS
EKG T AXIS: 38 DEGREES

## 2019-02-02 LAB
ORGANISM: ABNORMAL
S PYO THROAT QL CULT: ABNORMAL
S PYO THROAT QL CULT: ABNORMAL

## 2019-02-03 LAB
ORGANISM: ABNORMAL
URINE CULTURE, ROUTINE: ABNORMAL
URINE CULTURE, ROUTINE: ABNORMAL

## 2019-04-16 LAB
BACTERIA: ABNORMAL /HPF
BILIRUBIN URINE: ABNORMAL
BLOOD, URINE: ABNORMAL
CLARITY: ABNORMAL
COLOR: ABNORMAL
EPITHELIAL CELLS, UA: 2 /HPF (ref 0–5)
GLUCOSE URINE: NEGATIVE MG/DL
HYALINE CASTS: 5 /HPF (ref 0–8)
KETONES, URINE: ABNORMAL MG/DL
LEUKOCYTE ESTERASE, URINE: ABNORMAL
NITRITE, URINE: POSITIVE
PH UA: 5 (ref 5–8)
PROTEIN UA: 30 MG/DL
RBC UA: 31 /HPF (ref 0–4)
SPECIFIC GRAVITY UA: 1.02 (ref 1–1.03)
URINE REFLEX TO CULTURE: YES
UROBILINOGEN, URINE: 2 E.U./DL
WBC UA: 19 /HPF (ref 0–5)

## 2019-04-19 LAB
ORGANISM: ABNORMAL
ORGANISM: ABNORMAL
URINE CULTURE, ROUTINE: ABNORMAL

## 2019-06-04 ENCOUNTER — APPOINTMENT (OUTPATIENT)
Dept: CT IMAGING | Age: 69
End: 2019-06-04
Payer: MEDICARE

## 2019-06-04 ENCOUNTER — HOSPITAL ENCOUNTER (EMERGENCY)
Age: 69
Discharge: HOME | End: 2019-06-04
Attending: EMERGENCY MEDICINE
Payer: MEDICARE

## 2019-06-04 VITALS
RESPIRATION RATE: 18 BRPM | WEIGHT: 153 LBS | HEART RATE: 104 BPM | TEMPERATURE: 98.3 F | OXYGEN SATURATION: 94 % | DIASTOLIC BLOOD PRESSURE: 77 MMHG | SYSTOLIC BLOOD PRESSURE: 158 MMHG | BODY MASS INDEX: 28.16 KG/M2 | HEIGHT: 62 IN

## 2019-06-04 DIAGNOSIS — F32.A DEPRESSION, UNSPECIFIED DEPRESSION TYPE: ICD-10-CM

## 2019-06-04 DIAGNOSIS — F43.0 ACUTE STRESS REACTION CAUSING MIXED DISTURBANCE OF EMOTION AND CONDUCT: Primary | ICD-10-CM

## 2019-06-04 LAB
ALBUMIN SERPL-MCNC: 4.7 G/DL (ref 3.5–5.2)
ALP BLD-CCNC: 71 U/L (ref 35–104)
ALT SERPL-CCNC: 14 U/L (ref 5–33)
AMPHETAMINE SCREEN, URINE: NEGATIVE
ANION GAP SERPL CALCULATED.3IONS-SCNC: 19 MMOL/L (ref 7–19)
AST SERPL-CCNC: 13 U/L (ref 5–32)
BARBITURATE SCREEN URINE: NEGATIVE
BASOPHILS ABSOLUTE: 0.1 K/UL (ref 0–0.2)
BASOPHILS RELATIVE PERCENT: 0.5 % (ref 0–1)
BENZODIAZEPINE SCREEN, URINE: POSITIVE
BILIRUB SERPL-MCNC: 0.3 MG/DL (ref 0.2–1.2)
BILIRUBIN URINE: NEGATIVE
BLOOD, URINE: NEGATIVE
BUN BLDV-MCNC: 21 MG/DL (ref 8–23)
CALCIUM SERPL-MCNC: 9.6 MG/DL (ref 8.8–10.2)
CANNABINOID SCREEN URINE: NEGATIVE
CHLORIDE BLD-SCNC: 101 MMOL/L (ref 98–111)
CLARITY: CLEAR
CO2: 19 MMOL/L (ref 22–29)
COCAINE METABOLITE SCREEN URINE: NEGATIVE
COLOR: YELLOW
CREAT SERPL-MCNC: 1 MG/DL (ref 0.5–0.9)
EOSINOPHILS ABSOLUTE: 0.2 K/UL (ref 0–0.6)
EOSINOPHILS RELATIVE PERCENT: 1.5 % (ref 0–5)
ETHANOL: <10 MG/DL (ref 0–0.08)
GFR NON-AFRICAN AMERICAN: 55
GLUCOSE BLD-MCNC: 103 MG/DL (ref 74–109)
GLUCOSE URINE: NEGATIVE MG/DL
HCT VFR BLD CALC: 34.1 % (ref 37–47)
HEMOGLOBIN: 10.9 G/DL (ref 12–16)
KETONES, URINE: NEGATIVE MG/DL
LEUKOCYTE ESTERASE, URINE: NEGATIVE
LYMPHOCYTES ABSOLUTE: 3.4 K/UL (ref 1.1–4.5)
LYMPHOCYTES RELATIVE PERCENT: 25.5 % (ref 20–40)
Lab: ABNORMAL
MCH RBC QN AUTO: 28.2 PG (ref 27–31)
MCHC RBC AUTO-ENTMCNC: 32 G/DL (ref 33–37)
MCV RBC AUTO: 88.1 FL (ref 81–99)
MONOCYTES ABSOLUTE: 0.7 K/UL (ref 0–0.9)
MONOCYTES RELATIVE PERCENT: 5 % (ref 0–10)
NEUTROPHILS ABSOLUTE: 8.9 K/UL (ref 1.5–7.5)
NEUTROPHILS RELATIVE PERCENT: 67.1 % (ref 50–65)
NITRITE, URINE: NEGATIVE
OPIATE SCREEN URINE: NEGATIVE
PDW BLD-RTO: 14.4 % (ref 11.5–14.5)
PH UA: 6.5 (ref 5–8)
PLATELET # BLD: 307 K/UL (ref 130–400)
PMV BLD AUTO: 9.8 FL (ref 9.4–12.3)
POTASSIUM SERPL-SCNC: 4.8 MMOL/L (ref 3.5–5)
PROTEIN UA: NEGATIVE MG/DL
RBC # BLD: 3.87 M/UL (ref 4.2–5.4)
SODIUM BLD-SCNC: 139 MMOL/L (ref 136–145)
SPECIFIC GRAVITY UA: 1.01 (ref 1–1.03)
TOTAL PROTEIN: 8 G/DL (ref 6.6–8.7)
URINE REFLEX TO CULTURE: NORMAL
UROBILINOGEN, URINE: 0.2 E.U./DL
WBC # BLD: 13.3 K/UL (ref 4.8–10.8)

## 2019-06-04 PROCEDURE — 99285 EMERGENCY DEPT VISIT HI MDM: CPT

## 2019-06-04 PROCEDURE — 36415 COLL VENOUS BLD VENIPUNCTURE: CPT

## 2019-06-04 PROCEDURE — 99284 EMERGENCY DEPT VISIT MOD MDM: CPT | Performed by: EMERGENCY MEDICINE

## 2019-06-04 PROCEDURE — 70450 CT HEAD/BRAIN W/O DYE: CPT

## 2019-06-04 PROCEDURE — 85025 COMPLETE CBC W/AUTO DIFF WBC: CPT

## 2019-06-04 PROCEDURE — 81003 URINALYSIS AUTO W/O SCOPE: CPT

## 2019-06-04 PROCEDURE — G0480 DRUG TEST DEF 1-7 CLASSES: HCPCS

## 2019-06-04 PROCEDURE — 80053 COMPREHEN METABOLIC PANEL: CPT

## 2019-06-04 PROCEDURE — 80307 DRUG TEST PRSMV CHEM ANLYZR: CPT

## 2019-06-04 RX ORDER — CYCLOBENZAPRINE HCL 10 MG
TABLET ORAL
COMMUNITY
End: 2021-03-27

## 2019-06-04 RX ORDER — TIZANIDINE 4 MG/1
4 TABLET ORAL EVERY 6 HOURS PRN
COMMUNITY
End: 2020-11-01

## 2019-06-04 RX ORDER — TRAMADOL HYDROCHLORIDE 50 MG/1
TABLET ORAL
Status: ON HOLD | COMMUNITY
End: 2020-11-03 | Stop reason: HOSPADM

## 2019-06-04 ASSESSMENT — LIFESTYLE VARIABLES: HISTORY_ALCOHOL_USE: NO

## 2019-06-04 NOTE — ED NOTES
Bed: 20  Expected date:   Expected time:   Means of arrival:   Comments:     Yenifer Lopez RN  06/04/19 2450

## 2019-06-04 NOTE — ED TRIAGE NOTES
PT presents to ED c/o hallucinations that began yesterday. PT has been off opiates x 2 weeks and c/o suicidal thoughts.
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
## 2019-06-05 NOTE — ED PROVIDER NOTES
[facility]  eMERGENCY dEPARTMENT eNCOUnter      Pt Name: Colleen He  MRN: [id number]  [place] 1950  Date of evaluation: 6/4/2019  Provider: Elizabet Child MD    CHIEF COMPLAINT       Chief Complaint   Patient presents with    Withdrawal     opiates; last dose 2 weeks ago    Suicidal         HISTORY OF PRESENT ILLNESS   (Location/Symptom, Timing/Onset,Context/Setting, Quality, Duration, Modifying Factors, Severity)  Note limiting factors. Colleen He is a 70 y.o. female who presents to the emergency department for evaluation of hallucinations. Patient reports that she has been seeing people outside her house. Also reports that she saw her parents who passed away some years ago. Patient states that she has been under a great deal of increased stress recently. Her best friend recently told her that she had metastatic cancer and she has been very upset about these findings. Patient presented to the ED here today for evaluation as her family is concerned about the severity of hallucinations. She reports that she has not feeling suicidal and is not wanting to harm herself. No prior history of suicide attempt. HPI    NursingNotes were reviewed. REVIEW OF SYSTEMS    (2-9 systems for level 4, 10 or more for level 5)     Review of Systems   Constitutional: Negative for chills and fever. Respiratory: Negative for shortness of breath. Cardiovascular: Negative for chest pain. Gastrointestinal: Negative for abdominal pain and vomiting. Psychiatric/Behavioral: Positive for hallucinations and sleep disturbance. Negative for behavioral problems, confusion and suicidal ideas. The patient is nervous/anxious.              PAST MEDICALHISTORY     Past Medical History:   Diagnosis Date    Anxiety     Back pain of lumbar region with sciatica     Hyperlipidemia     Hypertension     Panic attack     PONV (postoperative nausea and vomiting)          SURGICAL HISTORY       Past Surgical History:   Procedure Laterality Date    APPENDECTOMY      BACK SURGERY      CERVICAL     BACK SURGERY      LUMBAR RUPTURED DISC    CHOLECYSTECTOMY      COLECTOMY      BLOCKAGE    HYSTERECTOMY      JOINT REPLACEMENT      KNEE ARTHROSCOPY Right     TOTAL KNEE ARTHROPLASTY Right 8/26/2016    KNEE TOTAL ARTHROPLASTY performed by Marina June DO [facility] [address] [facility]     Previous Medications    ALPRAZOLAM (XANAX PO)    Take 0.25 mg by mouth 2 times daily     CYCLOBENZAPRINE (FLEXERIL) 10 MG TABLET    cyclobenzaprine 10 mg tablet    DIPHENHYDRAMINE-APAP, SLEEP, (TYLENOL PM EXTRA STRENGTH PO)    Take by mouth as needed    HYDROCODONE-ACETAMINOPHEN (NORCO) 7.5-325 MG PER TABLET    Take 1 tablet by mouth every 6 hours as needed for Pain. Judithe Goldvein     LISINOPRIL-HYDROCHLOROTHIAZIDE (PRINZIDE;ZESTORETIC) 20-12.5 MG PER TABLET    Take 1 tablet by mouth daily Indications: HTN    TIZANIDINE (ZANAFLEX) 4 MG TABLET    Take 4 mg by mouth every 6 hours as needed    TRAMADOL (ULTRAM) 50 MG TABLET    tramadol 50 mg tablet every 6 hours prn       ALLERGIES     Naproxen    FAMILY HISTORY       Family History   Problem Relation Age of Onset    Diabetes Mother     Heart Disease Father     High Blood Pressure Father           SOCIAL HISTORY       Social History     Socioeconomic History    Marital status:      Spouse name: None    Number of children: None    Years of education: None    Highest education level: None   Occupational History    None   Social Needs    Financial resource strain: None    Food insecurity:     Worry: None     Inability: None    Transportation needs:     Medical: None     Non-medical: None   Tobacco Use    Smoking status: Current Every Day Smoker     Packs/day: 2.00     Years: 54.00     Pack years: 108.00     Types: Cigarettes    Smokeless tobacco: Never Used   Substance and Sexual Activity    Alcohol use: Yes     Comment: occasional    Drug use: No    Sexual activity: None Result Value    CO2 19 (*)     CREATININE 1.0 (*)     GFR Non- 55 (*)     All other components within normal limits   CBC WITH AUTO DIFFERENTIAL - Abnormal; Notable for the following components:    WBC 13.3 (*)     RBC 3.87 (*)     Hemoglobin 10.9 (*)     Hematocrit 34.1 (*)     MCHC 32.0 (*)     Neutrophils % 67.1 (*)     Neutrophils # 8.9 (*)     All other components within normal limits   URINE DRUG SCREEN - Abnormal; Notable for the following components:    Benzodiazepine Screen, Urine Positive (*)     All other components within normal limits   URINE RT REFLEX TO CULTURE   ETHANOL       All other labs were within normal range or not returned as of this dictation. EMERGENCY DEPARTMENT COURSE and DIFFERENTIAL DIAGNOSIS/MDM:   Vitals:    Vitals:    06/04/19 1718   BP: (!) 158/77   Pulse: 104   Resp: 18   Temp: 98.3 °F (36.8 °C)   TempSrc: Temporal   SpO2: 94%   Weight: 153 lb (69.4 kg)   Height: 5' 2\" (1.575 m)       MDM    Reassessment    Patient was seen and evaluated by mental health professional and after discussion with on-call psychiatry patient was cleared for discharge with outpatient follow-up. Both patient and family are agreeable with plan of care and feel comfortable going home at this time. PROCEDURES:  Unless otherwise noted below, none     Procedures    FINAL IMPRESSION      1. Acute stress reaction causing mixed disturbance of emotion and conduct    2.  Depression, unspecified depression type          DISPOSITION/PLAN   DISPOSITION Decision To Discharge 06/04/2019 10:41:47 PM      PATIENT REFERRED TO:  Mateusz Blake MD  [address] [place] [address]          [address] [facility]  [phone]    Schedule an appointment as soon as possible for a visit         DISCHARGE MEDICATIONS:  New Prescriptions    No medications on file          (Please note that portions of this note were completed with a voice recognition program.  Efforts were made to edit thedictations but occasionally words are mis-transcribed.)    Jamie Gotti MD (electronically signed)  Attending Emergency Physician         Jamie Gotti MD  07/11/19 3559

## 2019-07-11 ASSESSMENT — ENCOUNTER SYMPTOMS
VOMITING: 0
SHORTNESS OF BREATH: 0
ABDOMINAL PAIN: 0

## 2019-11-08 ENCOUNTER — APPOINTMENT (OUTPATIENT)
Dept: GENERAL RADIOLOGY | Age: 69
End: 2019-11-08
Payer: MEDICARE

## 2019-11-08 ENCOUNTER — APPOINTMENT (OUTPATIENT)
Dept: CT IMAGING | Age: 69
End: 2019-11-08
Payer: MEDICARE

## 2019-11-08 ENCOUNTER — HOSPITAL ENCOUNTER (EMERGENCY)
Age: 69
Discharge: HOME | End: 2019-11-08
Attending: EMERGENCY MEDICINE
Payer: MEDICARE

## 2019-11-08 VITALS
TEMPERATURE: 98 F | HEART RATE: 91 BPM | RESPIRATION RATE: 19 BRPM | DIASTOLIC BLOOD PRESSURE: 70 MMHG | OXYGEN SATURATION: 98 % | SYSTOLIC BLOOD PRESSURE: 145 MMHG

## 2019-11-08 DIAGNOSIS — N30.00 ACUTE CYSTITIS WITHOUT HEMATURIA: ICD-10-CM

## 2019-11-08 DIAGNOSIS — R53.1 GENERAL WEAKNESS: Primary | ICD-10-CM

## 2019-11-08 LAB
ALBUMIN SERPL-MCNC: 4.5 G/DL (ref 3.5–5.2)
ALP BLD-CCNC: 82 U/L (ref 35–104)
ALT SERPL-CCNC: 14 U/L (ref 5–33)
ANION GAP SERPL CALCULATED.3IONS-SCNC: 12 MMOL/L (ref 7–19)
AST SERPL-CCNC: 17 U/L (ref 5–32)
BACTERIA: ABNORMAL /HPF
BASOPHILS ABSOLUTE: 0.1 K/UL (ref 0–0.2)
BASOPHILS RELATIVE PERCENT: 0.7 % (ref 0–1)
BILIRUB SERPL-MCNC: <0.2 MG/DL (ref 0.2–1.2)
BILIRUBIN URINE: NEGATIVE
BLOOD, URINE: NEGATIVE
BUN BLDV-MCNC: 24 MG/DL (ref 8–23)
CALCIUM SERPL-MCNC: 9.5 MG/DL (ref 8.8–10.2)
CHLORIDE BLD-SCNC: 103 MMOL/L (ref 98–111)
CLARITY: CLEAR
CO2: 20 MMOL/L (ref 22–29)
COLOR: YELLOW
CREAT SERPL-MCNC: 0.9 MG/DL (ref 0.5–0.9)
EOSINOPHILS ABSOLUTE: 0.2 K/UL (ref 0–0.6)
EOSINOPHILS RELATIVE PERCENT: 1.9 % (ref 0–5)
EPITHELIAL CELLS, UA: 4 /HPF (ref 0–5)
GFR NON-AFRICAN AMERICAN: >60
GLUCOSE BLD-MCNC: 111 MG/DL (ref 74–109)
GLUCOSE URINE: NEGATIVE MG/DL
HCT VFR BLD CALC: 37 % (ref 37–47)
HEMOGLOBIN: 11.5 G/DL (ref 12–16)
HYALINE CASTS: 2 /HPF (ref 0–8)
IMMATURE GRANULOCYTES #: 0.1 K/UL
KETONES, URINE: NEGATIVE MG/DL
LEUKOCYTE ESTERASE, URINE: NEGATIVE
LYMPHOCYTES ABSOLUTE: 2.8 K/UL (ref 1.1–4.5)
LYMPHOCYTES RELATIVE PERCENT: 28 % (ref 20–40)
MCH RBC QN AUTO: 28.6 PG (ref 27–31)
MCHC RBC AUTO-ENTMCNC: 31.1 G/DL (ref 33–37)
MCV RBC AUTO: 92 FL (ref 81–99)
MONOCYTES ABSOLUTE: 0.5 K/UL (ref 0–0.9)
MONOCYTES RELATIVE PERCENT: 5.1 % (ref 0–10)
NEUTROPHILS ABSOLUTE: 6.3 K/UL (ref 1.5–7.5)
NEUTROPHILS RELATIVE PERCENT: 63.7 % (ref 50–65)
NITRITE, URINE: POSITIVE
PDW BLD-RTO: 14.2 % (ref 11.5–14.5)
PH UA: 5.5 (ref 5–8)
PLATELET # BLD: 303 K/UL (ref 130–400)
PMV BLD AUTO: 9.6 FL (ref 9.4–12.3)
POTASSIUM REFLEX MAGNESIUM: 5.1 MMOL/L (ref 3.5–5)
PROTEIN UA: NEGATIVE MG/DL
RBC # BLD: 4.02 M/UL (ref 4.2–5.4)
RBC UA: 1 /HPF (ref 0–4)
SODIUM BLD-SCNC: 135 MMOL/L (ref 136–145)
SPECIFIC GRAVITY UA: 1.02 (ref 1–1.03)
TOTAL PROTEIN: 8.1 G/DL (ref 6.6–8.7)
TROPONIN: <0.01 NG/ML (ref 0–0.03)
UROBILINOGEN, URINE: 0.2 E.U./DL
WBC # BLD: 9.9 K/UL (ref 4.8–10.8)
WBC UA: 8 /HPF (ref 0–5)

## 2019-11-08 PROCEDURE — 80053 COMPREHEN METABOLIC PANEL: CPT

## 2019-11-08 PROCEDURE — 6360000002 HC RX W HCPCS: Performed by: EMERGENCY MEDICINE

## 2019-11-08 PROCEDURE — 36415 COLL VENOUS BLD VENIPUNCTURE: CPT

## 2019-11-08 PROCEDURE — 93005 ELECTROCARDIOGRAM TRACING: CPT

## 2019-11-08 PROCEDURE — 85025 COMPLETE CBC W/AUTO DIFF WBC: CPT

## 2019-11-08 PROCEDURE — 70450 CT HEAD/BRAIN W/O DYE: CPT

## 2019-11-08 PROCEDURE — 71046 X-RAY EXAM CHEST 2 VIEWS: CPT

## 2019-11-08 PROCEDURE — 84484 ASSAY OF TROPONIN QUANT: CPT

## 2019-11-08 PROCEDURE — 2580000003 HC RX 258: Performed by: EMERGENCY MEDICINE

## 2019-11-08 PROCEDURE — 99285 EMERGENCY DEPT VISIT HI MDM: CPT

## 2019-11-08 PROCEDURE — 96365 THER/PROPH/DIAG IV INF INIT: CPT

## 2019-11-08 PROCEDURE — 99999 PR OFFICE/OUTPT VISIT,PROCEDURE ONLY: CPT | Performed by: EMERGENCY MEDICINE

## 2019-11-08 PROCEDURE — 81001 URINALYSIS AUTO W/SCOPE: CPT

## 2019-11-08 RX ORDER — 0.9 % SODIUM CHLORIDE 0.9 %
1000 INTRAVENOUS SOLUTION INTRAVENOUS ONCE
Status: COMPLETED | OUTPATIENT
Start: 2019-11-08 | End: 2019-11-08

## 2019-11-08 RX ORDER — CEPHALEXIN 500 MG/1
500 CAPSULE ORAL 3 TIMES DAILY
Qty: 21 CAPSULE | Refills: 0 | Status: SHIPPED | OUTPATIENT
Start: 2019-11-08 | End: 2019-11-15

## 2019-11-08 RX ADMIN — CEFTRIAXONE 1 G: 1 INJECTION, POWDER, FOR SOLUTION INTRAMUSCULAR; INTRAVENOUS at 14:41

## 2019-11-08 RX ADMIN — SODIUM CHLORIDE 1000 ML: 9 INJECTION, SOLUTION INTRAVENOUS at 13:46

## 2019-11-08 ASSESSMENT — ENCOUNTER SYMPTOMS
SHORTNESS OF BREATH: 0
VOMITING: 0
VOICE CHANGE: 0
EYE REDNESS: 0
ABDOMINAL PAIN: 0
COUGH: 0
EYE PAIN: 0
DIARRHEA: 0
RHINORRHEA: 0

## 2019-11-08 ASSESSMENT — PAIN DESCRIPTION - LOCATION: LOCATION: KNEE

## 2019-11-08 ASSESSMENT — PAIN DESCRIPTION - PAIN TYPE: TYPE: CHRONIC PAIN

## 2019-11-08 ASSESSMENT — PAIN SCALES - GENERAL: PAINLEVEL_OUTOF10: 8

## 2019-11-12 LAB
EKG P AXIS: 55 DEGREES
EKG P-R INTERVAL: 150 MS
EKG Q-T INTERVAL: 398 MS
EKG QRS DURATION: 82 MS
EKG QTC CALCULATION (BAZETT): 447 MS
EKG T AXIS: 49 DEGREES

## 2020-02-22 ENCOUNTER — HOSPITAL ENCOUNTER (EMERGENCY)
Facility: HOSPITAL | Age: 70
Discharge: HOME | End: 2020-02-22
Attending: EMERGENCY MEDICINE | Admitting: EMERGENCY MEDICINE

## 2020-02-22 VITALS
HEART RATE: 99 BPM | WEIGHT: 158 LBS | TEMPERATURE: 98.4 F | RESPIRATION RATE: 19 BRPM | OXYGEN SATURATION: 98 % | DIASTOLIC BLOOD PRESSURE: 67 MMHG | BODY MASS INDEX: 29.08 KG/M2 | SYSTOLIC BLOOD PRESSURE: 148 MMHG | HEIGHT: 62 IN

## 2020-02-22 DIAGNOSIS — R51.9 ACUTE NONINTRACTABLE HEADACHE, UNSPECIFIED HEADACHE TYPE: Primary | ICD-10-CM

## 2020-02-22 PROCEDURE — 99282 EMERGENCY DEPT VISIT SF MDM: CPT

## 2020-08-08 ENCOUNTER — HOSPITAL ENCOUNTER (EMERGENCY)
Facility: HOSPITAL | Age: 70
Discharge: HOME | End: 2020-08-08
Admitting: EMERGENCY MEDICINE

## 2020-08-08 ENCOUNTER — APPOINTMENT (OUTPATIENT)
Dept: CT IMAGING | Facility: HOSPITAL | Age: 70
End: 2020-08-08

## 2020-08-08 ENCOUNTER — APPOINTMENT (OUTPATIENT)
Dept: GENERAL RADIOLOGY | Facility: HOSPITAL | Age: 70
End: 2020-08-08

## 2020-08-08 VITALS
DIASTOLIC BLOOD PRESSURE: 78 MMHG | TEMPERATURE: 98.5 F | BODY MASS INDEX: 29.44 KG/M2 | HEART RATE: 98 BPM | WEIGHT: 160 LBS | HEIGHT: 62 IN | RESPIRATION RATE: 18 BRPM | OXYGEN SATURATION: 94 % | SYSTOLIC BLOOD PRESSURE: 166 MMHG

## 2020-08-08 DIAGNOSIS — N39.0 URINARY TRACT INFECTION WITHOUT HEMATURIA, SITE UNSPECIFIED: Primary | ICD-10-CM

## 2020-08-08 DIAGNOSIS — N17.9 AKI (ACUTE KIDNEY INJURY) (HCC): ICD-10-CM

## 2020-08-08 DIAGNOSIS — R53.1 WEAKNESS: ICD-10-CM

## 2020-08-08 DIAGNOSIS — E87.5 HYPERKALEMIA: ICD-10-CM

## 2020-08-08 DIAGNOSIS — R55 SYNCOPE, UNSPECIFIED SYNCOPE TYPE: ICD-10-CM

## 2020-08-08 LAB
ALBUMIN SERPL-MCNC: 4.6 G/DL (ref 3.5–5.2)
ALBUMIN/GLOB SERPL: 1.5 G/DL
ALP SERPL-CCNC: 69 U/L (ref 39–117)
ALT SERPL W P-5'-P-CCNC: 13 U/L (ref 1–33)
AMPHET+METHAMPHET UR QL: NEGATIVE
AMPHETAMINES UR QL: NEGATIVE
ANION GAP SERPL CALCULATED.3IONS-SCNC: 14 MMOL/L (ref 5–15)
AST SERPL-CCNC: 13 U/L (ref 1–32)
BACTERIA UR QL AUTO: ABNORMAL /HPF
BARBITURATES UR QL SCN: NEGATIVE
BASOPHILS # BLD AUTO: 0.05 10*3/MM3 (ref 0–0.2)
BASOPHILS NFR BLD AUTO: 0.5 % (ref 0–1.5)
BENZODIAZ UR QL SCN: POSITIVE
BILIRUB SERPL-MCNC: 0.2 MG/DL (ref 0–1.2)
BILIRUB UR QL STRIP: NEGATIVE
BUN SERPL-MCNC: 40 MG/DL (ref 8–23)
BUN/CREAT SERPL: 27.8 (ref 7–25)
BUPRENORPHINE SERPL-MCNC: NEGATIVE NG/ML
CALCIUM SPEC-SCNC: 9.7 MG/DL (ref 8.6–10.5)
CANNABINOIDS SERPL QL: NEGATIVE
CHLORIDE SERPL-SCNC: 100 MMOL/L (ref 98–107)
CK SERPL-CCNC: 60 U/L (ref 20–180)
CLARITY UR: CLEAR
CO2 SERPL-SCNC: 21 MMOL/L (ref 22–29)
COCAINE UR QL: NEGATIVE
COLOR UR: YELLOW
CREAT SERPL-MCNC: 1.44 MG/DL (ref 0.57–1)
D DIMER PPP FEU-MCNC: 0.85 MG/L (FEU) (ref 0–0.5)
D-LACTATE SERPL-SCNC: 2 MMOL/L (ref 0.5–2)
DEPRECATED RDW RBC AUTO: 47.5 FL (ref 37–54)
EOSINOPHIL # BLD AUTO: 0.23 10*3/MM3 (ref 0–0.4)
EOSINOPHIL NFR BLD AUTO: 2.3 % (ref 0.3–6.2)
ERYTHROCYTE [DISTWIDTH] IN BLOOD BY AUTOMATED COUNT: 14.7 % (ref 12.3–15.4)
ETHANOL UR QL: <0.01 %
GFR SERPL CREATININE-BSD FRML MDRD: 36 ML/MIN/1.73
GLOBULIN UR ELPH-MCNC: 3.1 GM/DL
GLUCOSE SERPL-MCNC: 111 MG/DL (ref 65–99)
GLUCOSE UR STRIP-MCNC: NEGATIVE MG/DL
HCT VFR BLD AUTO: 30.5 % (ref 34–46.6)
HGB BLD-MCNC: 9.8 G/DL (ref 12–15.9)
HGB UR QL STRIP.AUTO: NEGATIVE
HYALINE CASTS UR QL AUTO: ABNORMAL /LPF
IMM GRANULOCYTES # BLD AUTO: 0.03 10*3/MM3 (ref 0–0.05)
IMM GRANULOCYTES NFR BLD AUTO: 0.3 % (ref 0–0.5)
KETONES UR QL STRIP: NEGATIVE
LEUKOCYTE ESTERASE UR QL STRIP.AUTO: ABNORMAL
LYMPHOCYTES # BLD AUTO: 2.61 10*3/MM3 (ref 0.7–3.1)
LYMPHOCYTES NFR BLD AUTO: 26.6 % (ref 19.6–45.3)
MAGNESIUM SERPL-MCNC: 2.1 MG/DL (ref 1.6–2.4)
MCH RBC QN AUTO: 28.6 PG (ref 26.6–33)
MCHC RBC AUTO-ENTMCNC: 32.1 G/DL (ref 31.5–35.7)
MCV RBC AUTO: 88.9 FL (ref 79–97)
METHADONE UR QL SCN: NEGATIVE
MONOCYTES # BLD AUTO: 0.55 10*3/MM3 (ref 0.1–0.9)
MONOCYTES NFR BLD AUTO: 5.6 % (ref 5–12)
NEUTROPHILS NFR BLD AUTO: 6.34 10*3/MM3 (ref 1.7–7)
NEUTROPHILS NFR BLD AUTO: 64.7 % (ref 42.7–76)
NITRITE UR QL STRIP: POSITIVE
NRBC BLD AUTO-RTO: 0 /100 WBC (ref 0–0.2)
NT-PROBNP SERPL-MCNC: 78.7 PG/ML (ref 0–900)
OPIATES UR QL: POSITIVE
OXYCODONE UR QL SCN: NEGATIVE
PCP UR QL SCN: NEGATIVE
PH UR STRIP.AUTO: <=5 [PH] (ref 5–8)
PLATELET # BLD AUTO: 293 10*3/MM3 (ref 140–450)
PMV BLD AUTO: 9.9 FL (ref 6–12)
POTASSIUM SERPL-SCNC: 5.9 MMOL/L (ref 3.5–5.2)
PROPOXYPH UR QL: NEGATIVE
PROT SERPL-MCNC: 7.7 G/DL (ref 6–8.5)
PROT UR QL STRIP: NEGATIVE
RBC # BLD AUTO: 3.43 10*6/MM3 (ref 3.77–5.28)
RBC # UR: ABNORMAL /HPF
REF LAB TEST METHOD: ABNORMAL
SODIUM SERPL-SCNC: 135 MMOL/L (ref 136–145)
SP GR UR STRIP: 1.01 (ref 1–1.03)
SQUAMOUS #/AREA URNS HPF: ABNORMAL /HPF
TRICYCLICS UR QL SCN: POSITIVE
TROPONIN T SERPL-MCNC: <0.01 NG/ML (ref 0–0.03)
UROBILINOGEN UR QL STRIP: ABNORMAL
WBC # BLD AUTO: 9.81 10*3/MM3 (ref 3.4–10.8)
WBC UR QL AUTO: ABNORMAL /HPF

## 2020-08-08 PROCEDURE — 85025 COMPLETE CBC W/AUTO DIFF WBC: CPT | Performed by: PHYSICIAN ASSISTANT

## 2020-08-08 PROCEDURE — 71045 X-RAY EXAM CHEST 1 VIEW: CPT

## 2020-08-08 PROCEDURE — 84484 ASSAY OF TROPONIN QUANT: CPT | Performed by: PHYSICIAN ASSISTANT

## 2020-08-08 PROCEDURE — 81001 URINALYSIS AUTO W/SCOPE: CPT | Performed by: PHYSICIAN ASSISTANT

## 2020-08-08 PROCEDURE — 82550 ASSAY OF CK (CPK): CPT | Performed by: PHYSICIAN ASSISTANT

## 2020-08-08 PROCEDURE — 70450 CT HEAD/BRAIN W/O DYE: CPT

## 2020-08-08 PROCEDURE — 83735 ASSAY OF MAGNESIUM: CPT | Performed by: PHYSICIAN ASSISTANT

## 2020-08-08 PROCEDURE — 71275 CT ANGIOGRAPHY CHEST: CPT

## 2020-08-08 PROCEDURE — 93005 ELECTROCARDIOGRAM TRACING: CPT | Performed by: PHYSICIAN ASSISTANT

## 2020-08-08 PROCEDURE — 0 IOPAMIDOL PER 1 ML: Performed by: PHYSICIAN ASSISTANT

## 2020-08-08 PROCEDURE — 74177 CT ABD & PELVIS W/CONTRAST: CPT

## 2020-08-08 PROCEDURE — 83605 ASSAY OF LACTIC ACID: CPT | Performed by: PHYSICIAN ASSISTANT

## 2020-08-08 PROCEDURE — 93010 ELECTROCARDIOGRAM REPORT: CPT | Performed by: INTERNAL MEDICINE

## 2020-08-08 PROCEDURE — 93005 ELECTROCARDIOGRAM TRACING: CPT | Performed by: FAMILY MEDICINE

## 2020-08-08 PROCEDURE — 25010000002 CEFTRIAXONE PER 250 MG: Performed by: PHYSICIAN ASSISTANT

## 2020-08-08 PROCEDURE — 80307 DRUG TEST PRSMV CHEM ANLYZR: CPT | Performed by: PHYSICIAN ASSISTANT

## 2020-08-08 PROCEDURE — 80053 COMPREHEN METABOLIC PANEL: CPT | Performed by: PHYSICIAN ASSISTANT

## 2020-08-08 PROCEDURE — 87040 BLOOD CULTURE FOR BACTERIA: CPT | Performed by: PHYSICIAN ASSISTANT

## 2020-08-08 PROCEDURE — 99285 EMERGENCY DEPT VISIT HI MDM: CPT

## 2020-08-08 PROCEDURE — 96365 THER/PROPH/DIAG IV INF INIT: CPT

## 2020-08-08 PROCEDURE — 73523 X-RAY EXAM HIPS BI 5/> VIEWS: CPT

## 2020-08-08 PROCEDURE — 85379 FIBRIN DEGRADATION QUANT: CPT | Performed by: PHYSICIAN ASSISTANT

## 2020-08-08 PROCEDURE — 87086 URINE CULTURE/COLONY COUNT: CPT | Performed by: PHYSICIAN ASSISTANT

## 2020-08-08 PROCEDURE — 83880 ASSAY OF NATRIURETIC PEPTIDE: CPT | Performed by: PHYSICIAN ASSISTANT

## 2020-08-08 PROCEDURE — 87186 SC STD MICRODIL/AGAR DIL: CPT | Performed by: PHYSICIAN ASSISTANT

## 2020-08-08 PROCEDURE — 87088 URINE BACTERIA CULTURE: CPT | Performed by: PHYSICIAN ASSISTANT

## 2020-08-08 RX ORDER — CIPROFLOXACIN 500 MG/1
500 TABLET, FILM COATED ORAL 2 TIMES DAILY
Qty: 10 TABLET | Refills: 0 | Status: SHIPPED | OUTPATIENT
Start: 2020-08-08 | End: 2020-08-13

## 2020-08-08 RX ORDER — SODIUM CHLORIDE 0.9 % (FLUSH) 0.9 %
10 SYRINGE (ML) INJECTION AS NEEDED
Status: DISCONTINUED | OUTPATIENT
Start: 2020-08-08 | End: 2020-08-08 | Stop reason: HOSPADM

## 2020-08-08 RX ORDER — ONDANSETRON 4 MG/1
4 TABLET, ORALLY DISINTEGRATING ORAL EVERY 6 HOURS PRN
Qty: 12 TABLET | Refills: 0 | Status: SHIPPED | OUTPATIENT
Start: 2020-08-08 | End: 2021-03-26

## 2020-08-08 RX ADMIN — IOPAMIDOL 100 ML: 755 INJECTION, SOLUTION INTRAVENOUS at 18:40

## 2020-08-08 RX ADMIN — CEFTRIAXONE SODIUM 1 G: 1 INJECTION, POWDER, FOR SOLUTION INTRAMUSCULAR; INTRAVENOUS at 18:44

## 2020-08-08 RX ADMIN — SODIUM CHLORIDE, POTASSIUM CHLORIDE, SODIUM LACTATE AND CALCIUM CHLORIDE 1000 ML: 600; 310; 30; 20 INJECTION, SOLUTION INTRAVENOUS at 18:48

## 2020-08-08 NOTE — ED PROVIDER NOTES
"Subjective   History of Present Illness    Patient is a 69-year-old female presenting to ED with syncope and weakness.  Patient reported 2 days ago she woke up with nausea, vomiting, and diarrhea.  Patient reported since this time she has had slight improvement of the diarrhea but persistent nausea and vomiting.  Patient reported over the past 3 days she has had \"like 10-12 episodes of passing out, for today before we came here.\"  Patient reported that while they are worse upon positional changes sometimes she will \"just pass out for no reason.\"  Patient reported that she feels when she gets up to walk she is very wobbly and cannot keep her balance.  Patient reported that during 1 of the falls she feels she might have injured her left hip however she is still been able to ambulate.  Patient reported she has generalized weakness and denies any focal neurological deficits.  Patient denies any speech abnormalities, confusion, or facial droop.   at bedside agrees with this statement.  Patient reported no known recent sick contact.  Patient reported yesterday she tried some Zofran which helped her nausea and vomiting however it returned today.  Patient denies any known head injuries prior to the symptom onset.  Patient denies any previous history of similar episodes of weakness and syncope.  Patient denies any new or change medications in the past 4 to 6 weeks.    Patient takes daily Ativan, lisinopril-HCTZ, Flexeril, Ultram, Norco.  Patient has no medication allergies to codeine.  Patient has a medical history positive for hypertension, insomnia, anxiety.  Patient has a surgical history positive for hysterectomy, cholecystectomy, sigmoidoscopy with resection, tubal ligation, cervical laminectomy, as well as colonoscopies with the most recent in 9/2017.  Patient reports she currently smokes 1 carton of cigarettes a week.  Patient denies use of any other tobacco products, alcohol, marijuana, or any other " "IV/recreational/illicit drugs.    Records reviewed show patient was seen in ED on 2/22/20 for headache.   Patient has no inpatient, ED, or outpatient visits since that time.     Review of Systems   Constitutional: Negative for chills, diaphoresis and fever.   HENT: Negative.  Negative for facial swelling and tinnitus.    Eyes: Negative for visual disturbance (Denies decreased vision, blurry vision, or diplopia).   Respiratory: Negative.  Negative for shortness of breath.    Cardiovascular: Positive for chest pain (right sided, worse with inspiration; comes and goes).   Gastrointestinal: Positive for abdominal pain (RUQ and right mid), diarrhea (slightly improving), nausea and vomiting.   Genitourinary: Negative.  Negative for dysuria and hematuria.   Musculoskeletal: Positive for arthralgias (left hip). Negative for back pain, gait problem, joint swelling and neck pain.   Skin: Negative.  Negative for wound.   Neurological: Positive for syncope (\"like 10 to 12 times\") and weakness (generalized). Negative for dizziness, facial asymmetry, speech difficulty, light-headedness, numbness and headaches.        Denies head injury   Psychiatric/Behavioral: Negative.  Negative for confusion.   All other systems reviewed and are negative.      History reviewed. No pertinent past medical history.    Allergies   Allergen Reactions   • Codeine        Past Surgical History:   Procedure Laterality Date   • CERVICAL LAMINECTOMY     • CHOLECYSTECTOMY     • COLONOSCOPY  10/01/2014    polyps 4/  diverticulosis/  internal hemorrhoids   • COLONOSCOPY N/A 9/7/2017    Procedure: COLONOSCOPY WITH ANESTHESIA;  Surgeon: Dario Cordero DO;  Location: [facility] ENDOSCOPY;  Service:    • HYSTERECTOMY     • SIGMOIDOSCOPY      resection   • TUBAL ABDOMINAL LIGATION         Family History   Problem Relation Age of Onset   • Colon cancer Paternal Aunt    • Colon cancer Paternal Uncle    • Esophageal cancer Neg Hx        Social History "     Socioeconomic History   • Marital status:      Spouse name: Not on file   • Number of children: Not on file   • Years of education: Not on file   • Highest education level: Not on file   Tobacco Use   • Smoking status: Current Every Day Smoker     Packs/day: 2.00     Years: 55.00     Pack years: 110.00   • Smokeless tobacco: Never Used   Substance and Sexual Activity   • Alcohol use: No   • Drug use: No           Objective   Physical Exam   Constitutional: She is oriented to person, place, and time. She appears well-developed and well-nourished. She is cooperative.  Non-toxic appearance. She does not appear ill. No distress.   HENT:   Head: Normocephalic and atraumatic.   Mouth/Throat: Oropharynx is clear and moist.   Eyes: Pupils are equal, round, and reactive to light. Conjunctivae and EOM are normal. No scleral icterus.   Neck: Normal range of motion. Neck supple.   Cardiovascular: Normal rate, regular rhythm, normal heart sounds and intact distal pulses.   Pulmonary/Chest: Effort normal and breath sounds normal. No respiratory distress. She exhibits no tenderness (no reproducible tendenress to palpitation of chest wall).   Abdominal: Soft. Normal appearance and bowel sounds are normal. There is tenderness in the right upper quadrant.       Musculoskeletal: Normal range of motion.        Cervical back: Normal.        Thoracic back: Normal.        Lumbar back: Normal.   Neurological: She is alert and oriented to person, place, and time. No cranial nerve deficit or sensory deficit. She exhibits normal muscle tone. Coordination normal.   Cranial nerves II through XII intact.  No pronator drift.  Negative heel-to-shin.  No facial asymmetry/droop.  Normal speech.  5/5 symmetric strength of bilateral upper extremities.  5/5 symmetric strength bilateral lower extremities.   Skin: Skin is warm, dry and intact. Capillary refill takes less than 2 seconds. No abrasion, no ecchymosis and no laceration noted.    Psychiatric: She has a normal mood and affect. Her speech is normal and behavior is normal. Judgment normal.   Nursing note and vitals reviewed.      Procedures           ED Course  ED Course as of Aug 08 1958   Sat Aug 08, 2020   1716 CBC with no leukocytosis/leukopenia, H&H 9.8/30.5.CMP with elevated BUN 40, elevated creatinine 1.44, hyponatremia 135, hyper kalemia 5.9, decreased CO2 21, no anion gap.CK, lactic acid, alcohol, BMP, magnesium WNL.Initial troponin negative.    CXR shows: No dense infiltrate or effusion. Stable mild bronchial wall thickening. Old granulomatous disease.    Hip XR shows: No acute bony abnormality.    [JS]   1717 D dimer elevated at 0.85, chest CTA ordered for further evaluation.     [JS]   1720 Discussed case with Dr. Yunior Martinez at this time and reviewed EKG. Dr. Martinez agrees EKG appears ok with no need for hyperkalemia treatment at this time. Fluids being administered for VALENTINE and hyperkalemia. Dr. Martinez with no further recommendations at this time.     [JS]   1737 Urinalysis pending at this time.     CT continues to be pending transportation to department.     [JS]   1801 UDS positive for opiates, benzos, TCAs.Urinalysis with small leukocytes, nitrite positive, 0-2 RBC, 21-30 WBC, 4+ bacteria, 0-2 squamous epithelium.  Antibiotics ordered at this time to treat urinary tract infection.CT continues to be pending transportation to department, RN made aware.    [JS]   1821 Patient in CT at this time.     [JS]   1902 Patient is requesting to go home, made aware CT still pending dictation.     [JS]   1924 Head Ct showsNo acute intracranial findings.    Chest CTA shows: No evidence of pulmonary embolus. Multiple small pulmonary nodules measuring up to 6 mm including a cluster of nodules in the RIGHT upper lobe. These likely represent an indolent infectious/inflammatory process but recommend a follow-up chest  CT in 6 months to document resolution. Heavy atherosclerotic plaque and  "calcification in the thoracic aorta.    Abd/pel CT shows: No acute findings. Diffuse hepatic steatosis.    [JS]   1948 Upon reevaluation at this time patient is walking around her room after having pulled her IV out bleeding on the floor. Patient asked to sit down and reports \"I just want to get out of here.\" Discussed with patient lab and imaging findings and need for admission for further treatment. Patient is Aox3 and of sound mind/decision making. Patient is able to verbalize back to me risks, beenfits, and alternatives and would prefer to go home at this time to hydrate herself and complete outpatient antibiotics. Patient reported that she will call her PCP in 2 days for a follow up appointment.  Discussed strict return precautions and need for immediate return to ED should she develop any new or worsening symptoms.  Patient with no further questions, concerns, or needs at this time and is stable for discharge.     [JS]   1954 Dsicussed case with Dr. Martinez as well as patient's wishes to leave. Dr. Martinez in agreement that patient is capable of making the decision to leave and is stable at this time with no further recommendations.     [JS]      ED Course User Index  [JS] Ruy Cunningham PA-C                                           MDM  Number of Diagnoses or Management Options  VALENTINE (acute kidney injury) (CMS/[facility]):   Hyperkalemia:   Syncope, unspecified syncope type:   Urinary tract infection without hematuria, site unspecified:   Weakness:      Amount and/or Complexity of Data Reviewed  Clinical lab tests: reviewed and ordered  Tests in the radiology section of CPT®: ordered and reviewed  Tests in the medicine section of CPT®: ordered and reviewed  Decide to obtain previous medical records or to obtain history from someone other than the patient: yes  Review and summarize past medical records: yes  Discuss the patient with other providers: yes (Dr. Martinez (attending))    Patient Progress  Patient " progress: improved      Final diagnoses:   VALENTINE (acute kidney injury) (CMS/[facility])   Hyperkalemia   Syncope, unspecified syncope type   Weakness   Urinary tract infection without hematuria, site unspecified            Ruy Cunningham PA-C  08/08/20 1959

## 2020-08-09 NOTE — ED NOTES
Pt has expressed her want to  Leave ama, pt informed she had iv antibiotics infusing but she wanted to sign ama, pt encouraged to stay at least for medication administration, she has agreed to the, ervin steiner has been notified of pt's request and she is to update pt here shortly      Jeanie Crowley, RN  08/08/20 1948

## 2020-08-09 NOTE — DISCHARGE INSTRUCTIONS
It is very important that you complete your antibiotic prescriptions in their entirety even if you begin to feel better.   Please increase your hydration at home.   Please call Dr. Gabriel for a follow up appointment, repeat labs, and a repeat urinalysis this upcoming Monday 8/10/20.

## 2020-08-10 LAB — BACTERIA SPEC AEROBE CULT: ABNORMAL

## 2020-08-13 LAB
BACTERIA SPEC AEROBE CULT: NORMAL
BACTERIA SPEC AEROBE CULT: NORMAL

## 2020-11-01 ENCOUNTER — APPOINTMENT (OUTPATIENT)
Dept: GENERAL RADIOLOGY | Age: 70
DRG: 683 | End: 2020-11-01
Payer: MEDICARE

## 2020-11-01 ENCOUNTER — APPOINTMENT (OUTPATIENT)
Dept: CT IMAGING | Age: 70
DRG: 683 | End: 2020-11-01
Payer: MEDICARE

## 2020-11-01 ENCOUNTER — HOSPITAL ENCOUNTER (INPATIENT)
Age: 70
LOS: 8 days | Discharge: NURSING, REHAB OR LONG-TERM CARE | DRG: 683 | End: 2020-11-09
Attending: EMERGENCY MEDICINE | Admitting: HOSPITALIST
Payer: MEDICARE

## 2020-11-01 PROBLEM — R29.6 FREQUENT FALLS: Status: ACTIVE | Noted: 2020-11-01

## 2020-11-01 PROBLEM — N30.00 ACUTE CYSTITIS WITHOUT HEMATURIA: Status: ACTIVE | Noted: 2020-11-01

## 2020-11-01 PROBLEM — N17.9 AKI (ACUTE KIDNEY INJURY) (HCC): Status: ACTIVE | Noted: 2020-11-01

## 2020-11-01 LAB
ALBUMIN SERPL-MCNC: 4.7 G/DL (ref 3.5–5.2)
ALP BLD-CCNC: 81 U/L (ref 35–104)
ALT SERPL-CCNC: 9 U/L (ref 5–33)
ANION GAP SERPL CALCULATED.3IONS-SCNC: 15 MMOL/L (ref 7–19)
AST SERPL-CCNC: 10 U/L (ref 5–32)
BACTERIA: ABNORMAL /HPF
BILIRUB SERPL-MCNC: 0.3 MG/DL (ref 0.2–1.2)
BILIRUBIN URINE: ABNORMAL
BLOOD, URINE: NEGATIVE
BUN BLDV-MCNC: 42 MG/DL (ref 8–23)
CALCIUM SERPL-MCNC: 9.6 MG/DL (ref 8.8–10.2)
CHLORIDE BLD-SCNC: 100 MMOL/L (ref 98–111)
CLARITY: ABNORMAL
CO2: 22 MMOL/L (ref 22–29)
COLOR: ABNORMAL
CREAT SERPL-MCNC: 2.2 MG/DL (ref 0.5–0.9)
CREATININE URINE: 210.5 MG/DL (ref 4.2–622)
CRYSTALS, UA: ABNORMAL /HPF
EKG P AXIS: 53 DEGREES
EKG P-R INTERVAL: 152 MS
EKG Q-T INTERVAL: 404 MS
EKG QRS DURATION: 84 MS
EKG QTC CALCULATION (BAZETT): 442 MS
EKG T AXIS: 68 DEGREES
EPITHELIAL CELLS, UA: 0 /HPF (ref 0–5)
GFR AFRICAN AMERICAN: 27
GFR NON-AFRICAN AMERICAN: 22
GLUCOSE BLD-MCNC: 116 MG/DL (ref 74–109)
GLUCOSE URINE: NEGATIVE MG/DL
HCT VFR BLD CALC: 33.5 % (ref 37–47)
HEMOGLOBIN: 10.7 G/DL (ref 12–16)
HYALINE CASTS: 6 /HPF (ref 0–8)
KETONES, URINE: ABNORMAL MG/DL
LEUKOCYTE ESTERASE, URINE: ABNORMAL
MCH RBC QN AUTO: 28.7 PG (ref 27–31)
MCHC RBC AUTO-ENTMCNC: 31.9 G/DL (ref 33–37)
MCV RBC AUTO: 89.8 FL (ref 81–99)
NITRITE, URINE: POSITIVE
PDW BLD-RTO: 15.3 % (ref 11.5–14.5)
PH UA: 5 (ref 5–8)
PLATELET # BLD: 339 K/UL (ref 130–400)
PMV BLD AUTO: 9.7 FL (ref 9.4–12.3)
POTASSIUM REFLEX MAGNESIUM: 5.1 MMOL/L (ref 3.5–5)
POTASSIUM SERPL-SCNC: 4.7 MMOL/L (ref 3.5–5)
POTASSIUM, UR: 59.92 MMOL/L
PROTEIN PROTEIN: 35 MG/DL (ref 15–45)
PROTEIN UA: ABNORMAL MG/DL
RBC # BLD: 3.73 M/UL (ref 4.2–5.4)
RBC UA: 2 /HPF (ref 0–4)
SODIUM BLD-SCNC: 137 MMOL/L (ref 136–145)
SODIUM URINE: 74 MMOL/L
SPECIFIC GRAVITY UA: 1.02 (ref 1–1.03)
TOTAL PROTEIN: 7.9 G/DL (ref 6.6–8.7)
TROPONIN: <0.01 NG/ML (ref 0–0.03)
UROBILINOGEN, URINE: 1 E.U./DL
WBC # BLD: 9.8 K/UL (ref 4.8–10.8)
WBC UA: 52 /HPF (ref 0–5)

## 2020-11-01 PROCEDURE — 1210000000 HC MED SURG R&B

## 2020-11-01 PROCEDURE — 87086 URINE CULTURE/COLONY COUNT: CPT

## 2020-11-01 PROCEDURE — 6370000000 HC RX 637 (ALT 250 FOR IP): Performed by: PHYSICIAN ASSISTANT

## 2020-11-01 PROCEDURE — 93010 ELECTROCARDIOGRAM REPORT: CPT | Performed by: INTERNAL MEDICINE

## 2020-11-01 PROCEDURE — 81001 URINALYSIS AUTO W/SCOPE: CPT

## 2020-11-01 PROCEDURE — 84484 ASSAY OF TROPONIN QUANT: CPT

## 2020-11-01 PROCEDURE — 73560 X-RAY EXAM OF KNEE 1 OR 2: CPT

## 2020-11-01 PROCEDURE — 93005 ELECTROCARDIOGRAM TRACING: CPT | Performed by: EMERGENCY MEDICINE

## 2020-11-01 PROCEDURE — 72125 CT NECK SPINE W/O DYE: CPT

## 2020-11-01 PROCEDURE — 84300 ASSAY OF URINE SODIUM: CPT

## 2020-11-01 PROCEDURE — 84156 ASSAY OF PROTEIN URINE: CPT

## 2020-11-01 PROCEDURE — 99999 PR OFFICE/OUTPT VISIT,PROCEDURE ONLY: CPT | Performed by: EMERGENCY MEDICINE

## 2020-11-01 PROCEDURE — 82570 ASSAY OF URINE CREATININE: CPT

## 2020-11-01 PROCEDURE — 70450 CT HEAD/BRAIN W/O DYE: CPT

## 2020-11-01 PROCEDURE — 2580000003 HC RX 258: Performed by: EMERGENCY MEDICINE

## 2020-11-01 PROCEDURE — 72128 CT CHEST SPINE W/O DYE: CPT

## 2020-11-01 PROCEDURE — 6360000002 HC RX W HCPCS: Performed by: PHYSICIAN ASSISTANT

## 2020-11-01 PROCEDURE — 71045 X-RAY EXAM CHEST 1 VIEW: CPT

## 2020-11-01 PROCEDURE — 84133 ASSAY OF URINE POTASSIUM: CPT

## 2020-11-01 PROCEDURE — 6360000002 HC RX W HCPCS: Performed by: EMERGENCY MEDICINE

## 2020-11-01 PROCEDURE — 84132 ASSAY OF SERUM POTASSIUM: CPT

## 2020-11-01 PROCEDURE — 2580000003 HC RX 258: Performed by: PHYSICIAN ASSISTANT

## 2020-11-01 PROCEDURE — 80053 COMPREHEN METABOLIC PANEL: CPT

## 2020-11-01 PROCEDURE — 99284 EMERGENCY DEPT VISIT MOD MDM: CPT

## 2020-11-01 PROCEDURE — 87077 CULTURE AEROBIC IDENTIFY: CPT

## 2020-11-01 PROCEDURE — 36415 COLL VENOUS BLD VENIPUNCTURE: CPT

## 2020-11-01 PROCEDURE — 87186 SC STD MICRODIL/AGAR DIL: CPT

## 2020-11-01 PROCEDURE — 85027 COMPLETE CBC AUTOMATED: CPT

## 2020-11-01 RX ORDER — ONDANSETRON 2 MG/ML
4 INJECTION INTRAMUSCULAR; INTRAVENOUS EVERY 6 HOURS PRN
Status: DISCONTINUED | OUTPATIENT
Start: 2020-11-01 | End: 2020-11-04

## 2020-11-01 RX ORDER — DIPHENHYDRAMINE HCL 25 MG
25 TABLET ORAL NIGHTLY PRN
Status: DISCONTINUED | OUTPATIENT
Start: 2020-11-01 | End: 2020-11-09 | Stop reason: HOSPADM

## 2020-11-01 RX ORDER — PROMETHAZINE HYDROCHLORIDE 25 MG/1
12.5 TABLET ORAL EVERY 6 HOURS PRN
Status: DISCONTINUED | OUTPATIENT
Start: 2020-11-01 | End: 2020-11-04

## 2020-11-01 RX ORDER — ACETAMINOPHEN 325 MG/1
650 TABLET ORAL EVERY 6 HOURS PRN
Status: DISCONTINUED | OUTPATIENT
Start: 2020-11-01 | End: 2020-11-09 | Stop reason: HOSPADM

## 2020-11-01 RX ORDER — ACETAMINOPHEN 500 MG
500 TABLET ORAL NIGHTLY PRN
Status: DISCONTINUED | OUTPATIENT
Start: 2020-11-01 | End: 2020-11-09 | Stop reason: HOSPADM

## 2020-11-01 RX ORDER — NICOTINE 21 MG/24HR
1 PATCH, TRANSDERMAL 24 HOURS TRANSDERMAL DAILY
Status: DISCONTINUED | OUTPATIENT
Start: 2020-11-01 | End: 2020-11-08

## 2020-11-01 RX ORDER — SODIUM CHLORIDE 0.9 % (FLUSH) 0.9 %
10 SYRINGE (ML) INJECTION EVERY 12 HOURS SCHEDULED
Status: DISCONTINUED | OUTPATIENT
Start: 2020-11-01 | End: 2020-11-09 | Stop reason: HOSPADM

## 2020-11-01 RX ORDER — 0.9 % SODIUM CHLORIDE 0.9 %
500 INTRAVENOUS SOLUTION INTRAVENOUS ONCE
Status: COMPLETED | OUTPATIENT
Start: 2020-11-01 | End: 2020-11-01

## 2020-11-01 RX ORDER — SODIUM CHLORIDE 9 MG/ML
INJECTION, SOLUTION INTRAVENOUS CONTINUOUS
Status: ACTIVE | OUTPATIENT
Start: 2020-11-01 | End: 2020-11-02

## 2020-11-01 RX ORDER — SODIUM CHLORIDE 0.9 % (FLUSH) 0.9 %
10 SYRINGE (ML) INJECTION PRN
Status: DISCONTINUED | OUTPATIENT
Start: 2020-11-01 | End: 2020-11-08

## 2020-11-01 RX ORDER — ACETAMINOPHEN,DIPHENHYDRAMINE HCL 500; 25 MG/1; MG/1
1 TABLET, FILM COATED ORAL NIGHTLY PRN
Status: DISCONTINUED | OUTPATIENT
Start: 2020-11-01 | End: 2020-11-01 | Stop reason: CLARIF

## 2020-11-01 RX ORDER — ACETAMINOPHEN 650 MG/1
650 SUPPOSITORY RECTAL EVERY 6 HOURS PRN
Status: DISCONTINUED | OUTPATIENT
Start: 2020-11-01 | End: 2020-11-09 | Stop reason: HOSPADM

## 2020-11-01 RX ADMIN — SODIUM CHLORIDE: 9 INJECTION, SOLUTION INTRAVENOUS at 15:46

## 2020-11-01 RX ADMIN — CEFTRIAXONE 1 G: 1 INJECTION, POWDER, FOR SOLUTION INTRAMUSCULAR; INTRAVENOUS at 13:48

## 2020-11-01 RX ADMIN — ENOXAPARIN SODIUM 30 MG: 30 INJECTION SUBCUTANEOUS at 17:14

## 2020-11-01 RX ADMIN — SODIUM CHLORIDE, PRESERVATIVE FREE 10 ML: 5 INJECTION INTRAVENOUS at 20:36

## 2020-11-01 RX ADMIN — DIPHENHYDRAMINE HCL 25 MG: 25 TABLET ORAL at 20:35

## 2020-11-01 RX ADMIN — SODIUM CHLORIDE 500 ML: 9 INJECTION, SOLUTION INTRAVENOUS at 12:32

## 2020-11-01 RX ADMIN — ACETAMINOPHEN 500 MG: 500 TABLET, FILM COATED ORAL at 20:36

## 2020-11-01 ASSESSMENT — ENCOUNTER SYMPTOMS
PHOTOPHOBIA: 0
BACK PAIN: 1
DIARRHEA: 0
COUGH: 0
ABDOMINAL DISTENTION: 0
TROUBLE SWALLOWING: 0
SINUS PAIN: 0
EYE PAIN: 0
VOMITING: 0
SHORTNESS OF BREATH: 0
BLOOD IN STOOL: 0
ABDOMINAL PAIN: 0
NAUSEA: 0
CONSTIPATION: 0
STRIDOR: 0
SORE THROAT: 0
WHEEZING: 0

## 2020-11-01 ASSESSMENT — PAIN SCALES - GENERAL
PAINLEVEL_OUTOF10: 4
PAINLEVEL_OUTOF10: 0

## 2020-11-01 NOTE — CARE COORDINATION
SW met with pt and pts spouse at bedside regarding consult for possible SNF placement. SW stated that she has seen where pt has had a lot of falls. SW asked if pt had equipment at home. Pt stated she has a wheelchair at home and walker. Pt stated the walker gets in her way and she trips over it. SW asked if she thought she needed to go to SNF to get her some strength. Pt stated no.

## 2020-11-01 NOTE — H&P
[facility] Hospitalists      Hospitalist - History & Physical      PCP: Diana Moralez MD    Date of Admission: 11/1/2020    Date of Service: 11/1/2020    Chief Complaint:  Weakness and falls     History Of Present Illness: The patient is a 69 y.o. female with PMH of HTN, hyperlipidemia and back pain who presented to [facility] ED complaining of weakness, dizziness and falling. Pt is laying in bed sleeping heavy. , Tiffany Motley, is at bedside and gives majority of history.  states that over the last week or so the patient has been dizzy and off balance. States that pt has been talking \"out of her head\" for a few days and taking \"left over medicine from the doctor\" for dysuria. Pt states she has upper back pain and right knee pain. Pt  states pt smokes 2 packs or cigarettes a day. Upon arrival pt is afebrile and normotensive with SpO2 at 96% room air. Potassium 5.1, Creatinine 2.2, GFR 22, Hgb 10.7. UA shows UTI. CT T-spine shows no acute osseous injury. CT c-spine shows fusion of C5-C7 with corpectomy at C6 and no evidence of complication and no evidence of acute fracture. CT head -ve for acute process. XR R knee shows previous total arthroplasty with no evidence of complication. CXR bibasilar atelectasis. Pt is given rocephin and 500mL IVF bolus in ED.      Past Medical History:        Diagnosis Date    Anxiety     Back pain of lumbar region with sciatica     Hyperlipidemia     Hypertension     Panic attack     PONV (postoperative nausea and vomiting)        Past Surgical History:        Procedure Laterality Date    APPENDECTOMY      BACK SURGERY      CERVICAL     BACK SURGERY      LUMBAR RUPTURED DISC    CHOLECYSTECTOMY      COLECTOMY      BLOCKAGE    HYSTERECTOMY      JOINT REPLACEMENT      KNEE ARTHROSCOPY Right     TOTAL KNEE ARTHROPLASTY Right 8/26/2016    KNEE TOTAL ARTHROPLASTY performed by Ida Stapleton DO at [facility] Medications:  Prior to Admission medications Negative for myalgias. Neurological: Positive for dizziness, weakness and light-headedness. Negative for syncope, numbness and headaches. Falls   Hematological: Does not bruise/bleed easily. Psychiatric/Behavioral: Negative for agitation, confusion and dysphoric mood. Physical Examination:  VITALS: /65   Pulse 94   Temp 98 °F (36.7 °C)   Resp 20   Ht 5' 2\" (1.575 m)   Wt 161 lb (73 kg)   SpO2 95%   BMI 29.45 kg/m²   Physical Exam  Constitutional:       General: She is not in acute distress. Appearance: Normal appearance. She is not toxic-appearing or diaphoretic. Comments: Pt sleeping heavily    HENT:      Head: Normocephalic and atraumatic. Right Ear: External ear normal.      Left Ear: External ear normal.      Nose: No congestion or rhinorrhea. Mouth/Throat:      Comments: Facemask in place  Neck:      Musculoskeletal: Normal range of motion and neck supple. Cardiovascular:      Rate and Rhythm: Normal rate and regular rhythm. Pulses: Normal pulses. Heart sounds: Normal heart sounds. No murmur. No friction rub. No gallop. Pulmonary:      Effort: Pulmonary effort is normal. No respiratory distress. Breath sounds: Normal breath sounds. No stridor. No wheezing, rhonchi or rales. Abdominal:      General: Abdomen is flat. Bowel sounds are normal. There is no distension. Palpations: Abdomen is soft. Tenderness: There is no abdominal tenderness. Musculoskeletal: Normal range of motion. General: No swelling. Right lower leg: No edema. Left lower leg: No edema. Skin:     General: Skin is warm and dry. Coloration: Skin is not jaundiced. Findings: No erythema, lesion or rash.           Diagnostic Data:  CBC:  Recent Labs     11/01/20  1108   WBC 9.8   HGB 10.7*   HCT 33.5*        BMP:  Recent Labs     11/01/20  1108      K 5.1*      CO2 22   BUN 42*   CREATININE 2.2*   CALCIUM 9.6     Recent continue rocephin and await urine culture. Hypertension- normotensive right, hold off on BP meds. Monitor closely. Primary osteoarthritis of right knee- cautious pain control.       Tobacco abuse- nicotine patch       DVT prophylaxis- Lovenox     Signed:  Lior Gibbons PA-C  11/1/2020, 2:15 PM

## 2020-11-01 NOTE — PROGRESS NOTES
Myra Thomas arrived to room # 313. Presented with: REHANA  Mental Status: Patient is oriented, alert, coherent, logical, thought processes intact and able to concentrate and follow conversation. Vitals:    11/01/20 1440   BP: (!) 149/73   Pulse: 87   Resp: 18   Temp: 97.8 °F (36.6 °C)   SpO2: 93%     Patient safety contract and falls prevention contract reviewed with patient Yes. Oriented Patient to room. Call light within reach. Yes.   Needs, issues or concerns expressed at this time: no.      Electronically signed by Charito Barnett RN on 11/1/2020 at 3:16 PM

## 2020-11-01 NOTE — ED PROVIDER NOTES
[facility]  eMERGENCY dEPARTMENT eNCOUnter      Pt Name: Eyad Pierre  MRN: [id number]  [place] 1950  Date of evaluation: 11/1/2020  Provider: Mesha Blakely MD    CHIEF COMPLAINT       Chief Complaint   Patient presents with    Fall     Pt to ED with c/o weakness with increased falls          HISTORY OF PRESENT ILLNESS   (Location/Symptom, Timing/Onset,Context/Setting, Quality, Duration, Modifying Factors, Severity)  Note limiting factors. Eyad Pierre is a 69 y.o. female who presents to the emergency department with multiple complaints. Patient said that over about the past month she has had increased weakness and falls. Melanie Ocampo this started about 3 or 4 weeks ago but then symptoms resolved. However, about 5 days ago has had increasing generalized weakness and dizziness. Feels like she is off balance every time she tries to get up and walk. Has had multiple falls.  said she follows on a daily basis. Complains of upper back pain from her falls. Has some right knee pain as well although she said the right knee pain is chronic. Denies headache. No chest pain or difficulty breathing. No abdominal pain nausea vomiting diarrhea. Has had some dysuria for several days. Tells me she had some leftover antibiotics from previous UTI so took some antibiotics thinking she may have UTI. Cannot tell me what antibiotics were. Has also been taking over-the-counter Azo. No vision changes numbness or focal weakness. HPI    NursingNotes were reviewed. REVIEW OF SYSTEMS    (2-9 systems for level 4, 10 or more for level 5)     Review of Systems   Constitutional: Negative for fever. HENT: Negative for ear discharge, ear pain, hearing loss and tinnitus. Eyes: Negative for pain and visual disturbance. Respiratory: Negative for shortness of breath. Cardiovascular: Negative for chest pain, palpitations and leg swelling.    Gastrointestinal: Negative for abdominal pain, blood in stool, diarrhea and vomiting. Genitourinary: Negative for dysuria. Musculoskeletal: Positive for back pain (upper), gait problem and neck pain. Negative for neck stiffness. Skin: Negative for rash. Neurological: Positive for dizziness and weakness. Negative for syncope, facial asymmetry, speech difficulty, numbness and headaches. All other systems reviewed and are negative. A complete review of systems was performed and is negative except as noted above in the HPI. PAST MEDICAL HISTORY     Past Medical History:   Diagnosis Date    Anxiety     Back pain of lumbar region with sciatica     Hyperlipidemia     Hypertension     Panic attack     PONV (postoperative nausea and vomiting)          SURGICAL HISTORY       Past Surgical History:   Procedure Laterality Date    APPENDECTOMY      BACK SURGERY      CERVICAL     BACK SURGERY      LUMBAR RUPTURED DISC    CHOLECYSTECTOMY      COLECTOMY      BLOCKAGE    HYSTERECTOMY      JOINT REPLACEMENT      KNEE ARTHROSCOPY Right     TOTAL KNEE ARTHROPLASTY Right 8/26/2016    KNEE [facility]OTAL [facility] F[facility]       Previous Medications    ALPRAZOLAM (XANAX PO)    Take 1 mg by mouth 4 times daily as needed     CYCLOBENZAPRINE (FLEXERIL) 10 MG TABLET    cyclobenzaprine 10 mg tablet    DIPHENHYDRAMINE-APAP, SLEEP, (TYLENOL PM EXTRA STRENGTH PO)    Take by mouth as needed    HYDROCODONE-ACETAMINOPHEN (NORCO) 7.5-325 MG PER TABLET    Take 1 tablet by mouth every 6 hours as needed for Pain. Blanchie Bevels     LISINOPRIL-HYDROCHLOROTHIAZIDE (PRINZIDE;ZESTORETIC) 20-12.5 MG PER TABLET    Take 1 tablet by mouth daily Indications: HTN    TRAMADOL (ULTRAM) 50 MG TABLET    tramadol 50 mg tablet every 6 hours prn       ALLERGIES     Naproxen    FAMILY HISTORY       Family History   Problem Relation Age of Onset    Diabetes Mother     Heart Disease Father     High Blood Pressure Father           SOCIAL HISTORY Social History     Socioeconomic History    Marital status:      Spouse name: None    Number of children: None    Years of education: None    Highest education level: None   Occupational History    None   Social Needs    Financial resource strain: None    Food insecurity     Worry: None     Inability: None    Transportation needs     Medical: None     Non-medical: None   Tobacco Use    Smoking status: Current Every Day Smoker     Packs/day: 2.00     Years: 54.00     Pack years: 108.00     Types: Cigarettes    Smokeless tobacco: Never Used   Substance and Sexual Activity    Alcohol use: Not Currently     Comment: occasional    Drug use: No    Sexual activity: None   Lifestyle    Physical activity     Days per week: None     Minutes per session: None    Stress: None   Relationships    Social connections     Talks on phone: None     Gets together: None     Attends [employer] service: None     Active member of club or organization: None     Attends meetings of clubs or organizations: None     Relationship status: None    Intimate partner violence     Fear of current or ex partner: None     Emotionally abused: None     Physically abused: None     Forced sexual activity: None   Other Topics Concern    None   Social History Narrative    None       SCREENINGS    [place] Coma Scale  Eye Opening: Spontaneous  Best Verbal Response: Oriented  Best Motor Response: Obeys commands  [place] Coma Scale Score: 15        PHYSICAL EXAM    (up to 7 for level 4, 8 or more for level 5)     ED Triage Vitals [11/01/20 1047]   BP Temp Temp src Pulse Resp SpO2 Height Weight   111/76 98 °F (36.7 °C) -- 94 20 96 % 5' 2\" (1.575 m) 161 lb (73 kg)       Physical Exam  Vitals signs reviewed. Constitutional:       General: She is not in acute distress. Appearance: She is well-developed. HENT:      Head: Normocephalic and atraumatic.       Right Ear: Tympanic membrane, ear canal and external ear normal.      Left Ear: Tympanic membrane, ear canal and external ear normal.      Mouth/Throat:      Mouth: Mucous membranes are moist.      Pharynx: Oropharynx is clear. Eyes:      General: No scleral icterus. Extraocular Movements: Extraocular movements intact. Pupils: Pupils are equal, round, and reactive to light. Visual Fields: Right eye visual fields normal and left eye visual fields normal.   Neck:      Musculoskeletal: Normal range of motion and neck supple. Muscular tenderness present. Vascular: No JVD. Cardiovascular:      Rate and Rhythm: Normal rate and regular rhythm. Pulses: Normal pulses. Heart sounds: Normal heart sounds. No murmur. Pulmonary:      Effort: Pulmonary effort is normal. No respiratory distress. Breath sounds: Normal breath sounds. Chest:      Chest wall: No tenderness. Abdominal:      General: There is no distension. Palpations: Abdomen is soft. Tenderness: There is no abdominal tenderness. There is no guarding or rebound. Musculoskeletal:         General: No swelling or deformity. Right knee: She exhibits normal range of motion, no swelling, no effusion, no ecchymosis, no deformity and no bony tenderness. Tenderness found. Cervical back: She exhibits tenderness. She exhibits normal range of motion, no swelling, no edema and no deformity. Thoracic back: She exhibits tenderness. She exhibits no swelling, no edema and no deformity. Lumbar back: Normal. She exhibits normal range of motion, no tenderness, no bony tenderness, no swelling, no edema and no pain. Right lower leg: No edema. Left lower leg: No edema. Skin:     General: Skin is warm and dry. Capillary Refill: Capillary refill takes less than 2 seconds. Neurological:      General: No focal deficit present. Mental Status: She is alert and oriented to person, place, and time. GCS: GCS eye subscore is 4. GCS verbal subscore is 5.  GCS motor subscore is 6.      Cranial Nerves: Cranial nerves are intact. Sensory: Sensation is intact. Motor: Motor function is intact. Coordination: Coordination is intact. Psychiatric:         Mood and Affect: Mood normal.         Behavior: Behavior normal.         DIAGNOSTIC RESULTS     EKG: All EKG's are interpreted by the Emergency Department Physician who either signs or Co-signs this chart in the absence of a cardiologist.    Normal sinus rhythm. QT borderline prolonged. PVC. Borderline T wave changes. RADIOLOGY:   Non-plain film images such as CT, Ultrasound and MRI are read by the radiologist. Zollie Buckle images are visualized and preliminarily interpreted by the emergency physician with the below findings:    Interpretation per the Radiologist below, if available at the time of this note:    [address]   Final Result   No evidence of acute osseous injury in the thoracic spine. Signed by Dr Kimberly Walker on 11/1/2020 12:40 PM      CT CERVICAL SPINE WO CONTRAST   Final Result   1. Status post fusion at the C5-C7 level with corpectomy at C6. No   evidence of complication. 2. No evidence of fracture or listhesis. The disc space heights are   otherwise well-maintained. No evidence of central or neural foraminal   encroachment. .   Signed by Dr Kimberly Walker on 11/1/2020 12:36 PM      CT Head WO Contrast   Final Result   1. Mild cerebral and cerebellar volume loss with chronic microvascular   disease but no evidence of acute intracranial process. 2. Mucous retention cysts or polyps within both maxillary sinuses. Signed by Dr Kimberyl Walker on 11/1/2020 12:32 PM      XR KNEE RIGHT (1-2 VIEWS)   Final Result   1.. Status post previous right total knee arthroplasty. I do not see   any evidence of complication. 2. Small joint effusion. Signed by Dr Kimberly Walker on 11/1/2020 12:11 PM      XR CHEST PORTABLE   Final Result   . Bibasilar atelectasis. Lungs are otherwise clear. DISPOSITION/PLAN   DISPOSITION Admitted 11/01/2020 01:27:30 PM      PATIENT REFERRED TO:  @FUP@    DISCHARGE MEDICATIONS:  New Prescriptions    No medications on file          (Please note that portions of this note were completed with a voice recognition program.  Efforts were made to edit the dictations butoccasionally words are mis-transcribed.)    Laverne Berman MD (electronically signed)  AttendingEmergency Physician         Laverne Berman MD  11/01/20 8870

## 2020-11-02 LAB
ALBUMIN SERPL-MCNC: 3.7 G/DL (ref 3.5–5.2)
ALP BLD-CCNC: 71 U/L (ref 35–104)
ALT SERPL-CCNC: 8 U/L (ref 5–33)
ANION GAP SERPL CALCULATED.3IONS-SCNC: 11 MMOL/L (ref 7–19)
AST SERPL-CCNC: 11 U/L (ref 5–32)
BASOPHILS ABSOLUTE: 0 K/UL (ref 0–0.2)
BASOPHILS RELATIVE PERCENT: 0.5 % (ref 0–1)
BILIRUB SERPL-MCNC: <0.2 MG/DL (ref 0.2–1.2)
BUN BLDV-MCNC: 38 MG/DL (ref 8–23)
CALCIUM SERPL-MCNC: 8.6 MG/DL (ref 8.8–10.2)
CHLORIDE BLD-SCNC: 104 MMOL/L (ref 98–111)
CO2: 21 MMOL/L (ref 22–29)
CREAT SERPL-MCNC: 1.7 MG/DL (ref 0.5–0.9)
EOSINOPHILS ABSOLUTE: 0.2 K/UL (ref 0–0.6)
EOSINOPHILS RELATIVE PERCENT: 2 % (ref 0–5)
GFR AFRICAN AMERICAN: 36
GFR NON-AFRICAN AMERICAN: 30
GLUCOSE BLD-MCNC: 80 MG/DL (ref 74–109)
HCT VFR BLD CALC: 31.1 % (ref 37–47)
HEMOGLOBIN: 9.5 G/DL (ref 12–16)
IMMATURE GRANULOCYTES #: 0 K/UL
LYMPHOCYTES ABSOLUTE: 2.6 K/UL (ref 1.1–4.5)
LYMPHOCYTES RELATIVE PERCENT: 34.9 % (ref 20–40)
MCH RBC QN AUTO: 28.1 PG (ref 27–31)
MCHC RBC AUTO-ENTMCNC: 30.5 G/DL (ref 33–37)
MCV RBC AUTO: 92 FL (ref 81–99)
MONOCYTES ABSOLUTE: 0.4 K/UL (ref 0–0.9)
MONOCYTES RELATIVE PERCENT: 5 % (ref 0–10)
NEUTROPHILS ABSOLUTE: 4.2 K/UL (ref 1.5–7.5)
NEUTROPHILS RELATIVE PERCENT: 57.1 % (ref 50–65)
PDW BLD-RTO: 15.1 % (ref 11.5–14.5)
PLATELET # BLD: 277 K/UL (ref 130–400)
PMV BLD AUTO: 10.1 FL (ref 9.4–12.3)
POTASSIUM REFLEX MAGNESIUM: 4.6 MMOL/L (ref 3.5–5)
RBC # BLD: 3.38 M/UL (ref 4.2–5.4)
SODIUM BLD-SCNC: 136 MMOL/L (ref 136–145)
TOTAL PROTEIN: 6.2 G/DL (ref 6.6–8.7)
WBC # BLD: 7.4 K/UL (ref 4.8–10.8)

## 2020-11-02 PROCEDURE — 97162 PT EVAL MOD COMPLEX 30 MIN: CPT

## 2020-11-02 PROCEDURE — 97116 GAIT TRAINING THERAPY: CPT

## 2020-11-02 PROCEDURE — 1210000000 HC MED SURG R&B

## 2020-11-02 PROCEDURE — 85025 COMPLETE CBC W/AUTO DIFF WBC: CPT

## 2020-11-02 PROCEDURE — 80053 COMPREHEN METABOLIC PANEL: CPT

## 2020-11-02 PROCEDURE — 6370000000 HC RX 637 (ALT 250 FOR IP): Performed by: PHYSICIAN ASSISTANT

## 2020-11-02 PROCEDURE — 36415 COLL VENOUS BLD VENIPUNCTURE: CPT

## 2020-11-02 PROCEDURE — 2580000003 HC RX 258: Performed by: PHYSICIAN ASSISTANT

## 2020-11-02 PROCEDURE — 6360000002 HC RX W HCPCS: Performed by: PHYSICIAN ASSISTANT

## 2020-11-02 RX ADMIN — ENOXAPARIN SODIUM 30 MG: 30 INJECTION SUBCUTANEOUS at 15:21

## 2020-11-02 RX ADMIN — SODIUM CHLORIDE, PRESERVATIVE FREE 1 G: 5 INJECTION INTRAVENOUS at 13:10

## 2020-11-02 RX ADMIN — ACETAMINOPHEN 500 MG: 500 TABLET, FILM COATED ORAL at 19:35

## 2020-11-02 RX ADMIN — DIPHENHYDRAMINE HCL 25 MG: 25 TABLET ORAL at 19:35

## 2020-11-02 RX ADMIN — SODIUM CHLORIDE, PRESERVATIVE FREE 10 ML: 5 INJECTION INTRAVENOUS at 19:35

## 2020-11-02 RX ADMIN — ONDANSETRON 4 MG: 2 INJECTION INTRAMUSCULAR; INTRAVENOUS at 18:29

## 2020-11-02 RX ADMIN — ACETAMINOPHEN 650 MG: 325 TABLET ORAL at 16:26

## 2020-11-02 RX ADMIN — ONDANSETRON 4 MG: 2 INJECTION INTRAMUSCULAR; INTRAVENOUS at 11:54

## 2020-11-02 RX ADMIN — SODIUM CHLORIDE, PRESERVATIVE FREE 10 ML: 5 INJECTION INTRAVENOUS at 09:33

## 2020-11-02 ASSESSMENT — PAIN DESCRIPTION - ORIENTATION
ORIENTATION: LOWER
ORIENTATION: LOWER

## 2020-11-02 ASSESSMENT — PAIN SCALES - GENERAL
PAINLEVEL_OUTOF10: 6
PAINLEVEL_OUTOF10: 2
PAINLEVEL_OUTOF10: 0
PAINLEVEL_OUTOF10: 8
PAINLEVEL_OUTOF10: 7

## 2020-11-02 ASSESSMENT — PAIN DESCRIPTION - PAIN TYPE
TYPE: ACUTE PAIN

## 2020-11-02 ASSESSMENT — PAIN DESCRIPTION - DESCRIPTORS
DESCRIPTORS: SORE
DESCRIPTORS: SORE

## 2020-11-02 ASSESSMENT — PAIN DESCRIPTION - LOCATION
LOCATION: BUTTOCKS
LOCATION: BACK;BUTTOCKS
LOCATION: BACK

## 2020-11-02 ASSESSMENT — PAIN - FUNCTIONAL ASSESSMENT
PAIN_FUNCTIONAL_ASSESSMENT: ACTIVITIES ARE NOT PREVENTED
PAIN_FUNCTIONAL_ASSESSMENT: ACTIVITIES ARE NOT PREVENTED

## 2020-11-02 ASSESSMENT — PAIN DESCRIPTION - ONSET: ONSET: GRADUAL

## 2020-11-02 NOTE — PLAN OF CARE
Problem: Falls - Risk of:  Goal: Will remain free from falls  Description: Will remain free from falls  11/2/2020 0507 by Elena Garcia RN  Outcome: Ongoing  11/1/2020 1512 by Román Kaur RN  Outcome: Ongoing  Goal: Absence of physical injury  Description: Absence of physical injury  11/2/2020 0507 by Elena Garcia RN  Outcome: Ongoing  11/1/2020 1512 by Román Kaur RN  Outcome: Ongoing

## 2020-11-02 NOTE — PROGRESS NOTES
[facility] Hospitalists    Patient:  Jacek Seo  YOB: 1950  Date of Service: 11/2/2020  MRN: [id number]   Acct: [de-identified]   Primary Care Physician: Franci Loaiza MD  Advance Directive: Full Code  Admit Date: 11/1/2020       Hospital Day: 1  Portions of this note have been copied forward, however, changed to reflect the most current clinical status of this patient. CHIEF COMPLAINT Weakness and falls    SUBJECTIVE:  Mrs. Gerson Covington has no new complaints. States she was feeling much better this morning but became weak when working with Physical therapy. We discussed her medication list and she states that it is always changing when she goes to see her PCP. She denies N/V, chest pain, abdominal pain. Would like home health at discharge. Cumulative Hospital Course:   Patient is a 69year old female with PMH HTN, HLD, and back pain who presented to [address] [place] [address] ED complaining of weakness, dizziness and frequent falls.  gave history of confusion. She was admitted to Hospitalist service for REHANA and UTI and was continued on Rocephin and IVF's. Zestoretic and sedating medications discontinued. Review of Systems:   14 point review of systems is negative except as specifically addressed above. Objective:   VITALS:  BP (!) 149/64   Pulse 88   Temp 98.1 °F (36.7 °C) (Temporal)   Resp 14   Ht 5' 2\" (1.575 m)   Wt 159 lb 12.8 oz (72.5 kg)   LMP  (LMP Unknown)   SpO2 91%   Breastfeeding No   BMI 29.23 kg/m²   24HR INTAKE/OUTPUT:      Intake/Output Summary (Last 24 hours) at 11/2/2020 1005  Last data filed at 11/2/2020 0933  Gross per 24 hour   Intake 250 ml   Output 375 ml   Net -125 ml     General appearance: alert, appears stated age, cooperative and no distress  Head: Normocephalic, without obvious abnormality, atraumatic  Eyes: conjunctivae/corneas clear. PERRL, EOM's intact.    Ears: normal external ears and nose, throat without exudate  Neck: no adenopathy, no carotid bruit, no JVD, supple, symmetrical, trachea midline   Lungs: clear to auscultation bilaterally,no rales or wheezes   Heart: regular rate and rhythm, S1, S2 normal, no murmur  Abdomen:soft, non-tender; non-distended, normal bowel sounds no masses, no organomegaly  Extremities:No lower extremity edema,  No erythema, no tenderness to palpation  Skin: Skin color, texture, turgor normal. No rashes or lesions  Lymphatic: No palpable lymph node enlargment  Neurologic: Alert and oriented X 3, normal strength and tone. No focal deficits  Psychiatric: Alert and oriented, thought content appropriate, normal insight, mood appropriate    Medications:      sodium chloride flush  10 mL Intravenous 2 times per day    enoxaparin  30 mg Subcutaneous Q24H    cefTRIAXone (ROCEPHIN) IV  1 g Intravenous Q24H    nicotine  1 patch Transdermal Daily     sodium chloride flush, acetaminophen **OR** acetaminophen, promethazine **OR** ondansetron, acetaminophen **AND** diphenhydrAMINE  DIET RENAL;     Lab and other Data:     Recent Labs     11/01/20  1108 11/02/20  0137   WBC 9.8 7.4   HGB 10.7* 9.5*    277     Recent Labs     11/01/20  1108 11/01/20  1648 11/02/20  0137     --  136   K 5.1* 4.7 4.6     --  104   CO2 22  --  21*   BUN 42*  --  38*   CREATININE 2.2*  --  1.7*   GLUCOSE 116*  --  80     Recent Labs     11/01/20  1108 11/02/20  0137   AST 10 11   ALT 9 8   BILITOT 0.3 <0.2   ALKPHOS 81 71     Troponin T:   Recent Labs     11/01/20  1108   TROPONINI <0.01     UA:  Recent Labs     11/01/20  1305   COLORU DARK YELLOW*   PHUR 5.0   WBCUA 52*   RBCUA 2   BACTERIA 4+*   CLARITYU CLOUDY*   SPECGRAV 1.019   LEUKOCYTESUR MODERATE*   UROBILINOGEN 1.0   BILIRUBINUR SMALL*   BLOODU Negative   GLUCOSEU Negative     RAD:   Xr Knee Right (1-2 Views)  1. Status post previous right total knee arthroplasty. I do not see any evidence of complication. 2. Small joint effusion. Signed by Dr Markel Wells on 11/1/2020 12:11 PM    Ct Head Wo Contrast  1. Mild cerebral and cerebellar volume loss with chronic microvascular disease but no evidence of acute intracranial process. 2. Mucous retention cysts or polyps within both maxillary sinuses. Signed by Dr Lovette Schwab on 11/1/2020 12:32 PM    Ct Cervical Spine Wo Contrast  1. Status post fusion at the C5-C7 level with corpectomy at C6. No evidence of complication. 2. No evidence of fracture or listhesis. The disc space heights are otherwise well-maintained. No evidence of central or neural foraminal encroachment. . Signed by Dr Lovette Schwab on 11/1/2020 12:36 PM    Ct Thoracic Spine Wo Contrast  No evidence of acute osseous injury in the thoracic spine. Signed by Dr Lovette Schwab on 11/1/2020 12:40 PM    Xr Chest Portable   Bibasilar atelectasis. Lungs are otherwise clear. Signed by Dr Lovette Schwab on 11/1/2020 11:43 AM    Micro: Urine culture collected 11/01/2020 pending    Assessment/Plan   Principal Problem:    REHANA (acute kidney injury) (HCC)-IVF's continued, improving, consider 2/2 dehydration    Active Problems:    Acute cystitis without hematuria-Rocephin continued, urine culture pending      Frequent falls-PT/OT/SW consulted, sedating medications on hold, orthostatics unremarkable      Hypertension-stable, Zestoretic discontinued-discussed with patient and spouse       Osteoarthritis right knee-noted    Possible DC in AM. Further orders per clinical course attending.     Antibiotic: Rocephin      DVT Prophylaxis: Lovenox    Heidi Palacio PA-C

## 2020-11-02 NOTE — PROGRESS NOTES
Physical Therapy    Facility/Department: [facility] 3 SHAZIA/VAS/MED  Initial Assessment    NAME: Royce Paredes  : 1950  MRN: [id number]    Date of Service: 2020    Discharge Recommendations:  Continue to assess pending progress, Patient would benefit from continued therapy after discharge, 24 hour supervision or assist        Assessment   Body structures, Functions, Activity limitations: Decreased functional mobility ; Decreased strength;Decreased cognition;Decreased balance;Decreased coordination  Assessment: Pt. will benefit from PT to decrease impairments. Pt. a fall risk and should not attempt mobility on her own at this time. Most likely pt would be safe to amb. with staff A with gait belt to amb. short distances. Anticipate pt. will need cont. PT after d/c for balance and mobility training. RW left in pt's room, but she is fearful of using it due to a fall with a SW right after her knee replacement. Treatment Diagnosis: impaired gait and mobility  Prognosis: Good  Decision Making: Medium Complexity  PT Education: Goals;PT Role;Plan of Care;Gait Training;General Safety;Transfer Training;Functional Mobility Training  Patient Education: use of call light for staff A  Barriers to Learning: none noted  REQUIRES PT FOLLOW UP: Yes  Activity Tolerance  Activity Tolerance: Patient Tolerated treatment well       Patient Diagnosis(es): The primary encounter diagnosis was Multiple falls. Diagnoses of General weakness, REHANA (acute kidney injury) ([address] and Hyperkalemia were also pertinent to this visit. has a past medical history of Anxiety, Arthritis, Back pain of lumbar region with sciatica, Chronic kidney disease, Disease of blood and blood forming organ, Hyperlipidemia, Hypertension, Movement disorder, Other disorders of kidney and ureter in diseases classified elsewhere, Panic attack, and PONV (postoperative nausea and vomiting).    has a past surgical history that includes Knee arthroscopy (Right); back surgery; back surgery; Cholecystectomy; Hysterectomy; Appendectomy; colectomy; joint replacement; Total knee arthroplasty (Right, 8/26/2016); Abdomen surgery; Colonoscopy; eye surgery; and skin biopsy. Restrictions  Restrictions/Precautions  Restrictions/Precautions: Fall Risk  Required Braces or Orthoses?: No  Vision/Hearing  Hearing: Within functional limits     Subjective  General  Chart Reviewed: Yes  Patient assessed for rehabilitation services?: Yes  Response To Previous Treatment: Not applicable  Family / Caregiver Present: No  Referring Practitioner: Neva Cobian PA-C  Referral Date : 11/01/20  Diagnosis: REHANA, multiple falls, generalized weakness, hyperkalemia  Follows Commands: Within Functional Limits  General Comment  Comments: RN, Margoth, eleanor PT. Subjective  Subjective: Pt willing to walk in the room a little and get up to the chair. Pain Screening  Patient Currently in Pain: Yes  Pain Assessment  Pain Assessment: 0-10  Pain Level: 2  Pain Type: Acute pain  Pain Location: Buttocks  Pain Descriptors: Sore  Functional Pain Assessment: Activities are not prevented  Non-Pharmaceutical Pain Intervention(s): Ambulation/Increased Activity;Repositioned  Response to Pain Intervention: Patient Satisfied  Vital Signs  Patient Currently in Pain: Yes  Oxygen Therapy  O2 Device: None (Room air)  Pre Treatment Pain Screening  Intervention List: Patient able to continue with treatment    Orientation  Orientation  Overall Orientation Status: Within Normal Limits  Social/Functional History  Social/Functional History  Lives With: Spouse  Type of Home: House  Home Layout: One level  Home Access: Stairs to enter with rails  Entrance Stairs - Number of Steps: 2  Home Equipment: (reports no equipment at home)  Receives Help From: Family  ADL Assistance: Independent  Ambulation Assistance: Independent  Transfer Assistance: Independent  Additional Comments: multiple falls at home per chart and per pt.  Pt. became tearful thinking about her home life and how her son committed suicide in the past and her little 8 y.o. grandson who lives in a SNF in [place]. Cognition   Cognition  Overall Cognitive Status: WNL    Objective     Observation/Palpation  Posture: Good  Observation: pt states her back side is sore, and nursing student able to assess pt's sacrum. Slight redness in middle of sacrum. Bruising purple/yellow [place] on R buttock. AROM RLE (degrees)  RLE AROM: WFL  AROM LLE (degrees)  LLE AROM : WFL  Strength RLE  Strength RLE: WFL  Comment: grossly 4-/5  Strength LLE  Strength LLE: WFL  Comment: grossly 4-/5        Bed mobility  Rolling to Left: Stand by assistance  Rolling to Right: Stand by assistance  Supine to Sit: Stand by assistance  Sit to Supine: Unable to assess  Scooting: Stand by assistance  Comment: pt sat on EOB x 3 mins SBA  Transfers  Sit to Stand: Contact guard assistance  Stand to sit: Contact guard assistance  Ambulation  Ambulation?: Yes  WB Status: no restrictions  Ambulation 1  Surface: level tile  Device: Hand-Held Assist  Assistance: Minimal assistance  Quality of Gait: unsteady, LOB to L side needing Mod A to regain mobility  Gait Deviations: Slow Myriam;Decreased step length;Decreased step height  Distance: 79'  Comments: pt stopped to regain balance after LOB and began to get tearful again     Balance  Posture: Good  Sitting - Static: Good;+  Sitting - Dynamic: Good;-  Standing - Static: Fair;-  Standing - Dynamic: Poor;+        Plan   Plan  Times per week: 3-7  Times per day: Daily  Plan weeks: 2  Current Treatment Recommendations: Strengthening, Balance Training, Functional Mobility Training, Transfer Training, Gait Training, Positioning, Equipment Evaluation, Education, & procurement, Patient/Caregiver Education & Training  Plan Comment: cont. PT per POC.   Safety Devices  Type of devices: Gait belt, Patient at risk for falls, Nurse notified, Call light within reach, Left in chair, Chair alarm in

## 2020-11-02 NOTE — CARE COORDINATION
Spoke with patient regarding MD orders for [facility] services. Patient agreeable and has chosen [facility]. Referral Faxed. [address] [place] [address] [phone]. [phone]. Please notify [facility] when patient discharges and fax DC Summary,  DC med list and any new [facility] orders. The Patient  was provided with a choice of provider and agrees with the discharge plan. [x] Yes [] No    Freedom of choice list was provided with basic dialogue that supports the patient's individualized plan of care/goals, treatment preferences and shares the quality data associated with the providers.  [x] Yes [] No  Electronically signed by Oscar Rankin RN on 11/2/2020 at 2:51 PM

## 2020-11-02 NOTE — CARE COORDINATION
hospital on day of DISCHARGE:    Name of person committed to bringing portable tank at discharge: Active with HD/PD prior to admission: no  Nephrologist:    HD Center:      Transition Plan:  home with spouse; possible for SNF? Transportation PLAN for Discharge:  Spouse     Factors facilitating achievement of predicted outcomes: being able to ambulate independatly    Barriers to discharge:  ambulation      Additional CM/SW Notes: spoke with pt and spouse re: dc plans. Currently the plan is for pt to return home with spouse. Has all equipment at home that she needs. Per spouse , pt will need to be strong enough to be able to ambulate with minimal asssit or she will need some type of rehab prior to returning home. Per spouse, if it came to that, they were agreeable to referral to [facility] but was not ready to make that descision just yet. Will follow up with them tomorrow to see how she is doing with therapy  Will follow          Caitlin Doran and/or her family were provided with choice of provider.         Frandy Booth RN  [facility]  Care Management Department  Ph:  [phone]   Fax:   Electronically signed by Frandy Booth RN on 11/2/2020 at 2:42 PM

## 2020-11-03 ENCOUNTER — APPOINTMENT (OUTPATIENT)
Dept: GENERAL RADIOLOGY | Age: 70
DRG: 683 | End: 2020-11-03
Payer: MEDICARE

## 2020-11-03 LAB
ANION GAP SERPL CALCULATED.3IONS-SCNC: 15 MMOL/L (ref 7–19)
BUN BLDV-MCNC: 22 MG/DL (ref 8–23)
CALCIUM SERPL-MCNC: 9.3 MG/DL (ref 8.8–10.2)
CHLORIDE BLD-SCNC: 101 MMOL/L (ref 98–111)
CO2: 19 MMOL/L (ref 22–29)
CREAT SERPL-MCNC: 1 MG/DL (ref 0.5–0.9)
GFR AFRICAN AMERICAN: >59
GFR NON-AFRICAN AMERICAN: 55
GLUCOSE BLD-MCNC: 161 MG/DL (ref 74–109)
LACTIC ACID: 1 MMOL/L (ref 0.5–1.9)
POTASSIUM REFLEX MAGNESIUM: 4.6 MMOL/L (ref 3.5–5)
SODIUM BLD-SCNC: 135 MMOL/L (ref 136–145)

## 2020-11-03 PROCEDURE — 80048 BASIC METABOLIC PNL TOTAL CA: CPT

## 2020-11-03 PROCEDURE — 2500000003 HC RX 250 WO HCPCS: Performed by: PHYSICIAN ASSISTANT

## 2020-11-03 PROCEDURE — 6360000002 HC RX W HCPCS: Performed by: HOSPITALIST

## 2020-11-03 PROCEDURE — 2580000003 HC RX 258: Performed by: HOSPITALIST

## 2020-11-03 PROCEDURE — 6370000000 HC RX 637 (ALT 250 FOR IP): Performed by: PHYSICIAN ASSISTANT

## 2020-11-03 PROCEDURE — 2500000003 HC RX 250 WO HCPCS: Performed by: HOSPITALIST

## 2020-11-03 PROCEDURE — 6360000002 HC RX W HCPCS: Performed by: PHYSICIAN ASSISTANT

## 2020-11-03 PROCEDURE — 97530 THERAPEUTIC ACTIVITIES: CPT

## 2020-11-03 PROCEDURE — 36415 COLL VENOUS BLD VENIPUNCTURE: CPT

## 2020-11-03 PROCEDURE — 6370000000 HC RX 637 (ALT 250 FOR IP): Performed by: HOSPITALIST

## 2020-11-03 PROCEDURE — 1210000000 HC MED SURG R&B

## 2020-11-03 PROCEDURE — 2580000003 HC RX 258: Performed by: PHYSICIAN ASSISTANT

## 2020-11-03 PROCEDURE — 83605 ASSAY OF LACTIC ACID: CPT

## 2020-11-03 PROCEDURE — 74018 RADEX ABDOMEN 1 VIEW: CPT

## 2020-11-03 RX ORDER — PROMETHAZINE HYDROCHLORIDE 25 MG/ML
12.5 INJECTION, SOLUTION INTRAMUSCULAR; INTRAVENOUS ONCE
Status: COMPLETED | OUTPATIENT
Start: 2020-11-03 | End: 2020-11-03

## 2020-11-03 RX ORDER — HYDRALAZINE HYDROCHLORIDE 25 MG/1
25 TABLET, FILM COATED ORAL EVERY 8 HOURS SCHEDULED
Status: DISCONTINUED | OUTPATIENT
Start: 2020-11-03 | End: 2020-11-04

## 2020-11-03 RX ORDER — HYDRALAZINE HYDROCHLORIDE 25 MG/1
25 TABLET, FILM COATED ORAL EVERY 8 HOURS SCHEDULED
Qty: 90 TABLET | Refills: 3 | Status: SHIPPED | OUTPATIENT
Start: 2020-11-03

## 2020-11-03 RX ORDER — ONDANSETRON 2 MG/ML
4 INJECTION INTRAMUSCULAR; INTRAVENOUS EVERY 6 HOURS
Status: DISCONTINUED | OUTPATIENT
Start: 2020-11-03 | End: 2020-11-08

## 2020-11-03 RX ORDER — HYDROCODONE BITARTRATE AND ACETAMINOPHEN 7.5; 325 MG/1; MG/1
1 TABLET ORAL ONCE
Status: COMPLETED | OUTPATIENT
Start: 2020-11-03 | End: 2020-11-03

## 2020-11-03 RX ORDER — ISOSORBIDE MONONITRATE 30 MG/1
30 TABLET, EXTENDED RELEASE ORAL DAILY
Status: DISCONTINUED | OUTPATIENT
Start: 2020-11-03 | End: 2020-11-09 | Stop reason: HOSPADM

## 2020-11-03 RX ORDER — SODIUM BICARBONATE 650 MG/1
650 TABLET ORAL 4 TIMES DAILY
Status: DISCONTINUED | OUTPATIENT
Start: 2020-11-03 | End: 2020-11-03

## 2020-11-03 RX ORDER — ENALAPRILAT 2.5 MG/2ML
1.25 INJECTION INTRAVENOUS EVERY 6 HOURS PRN
Status: DISCONTINUED | OUTPATIENT
Start: 2020-11-03 | End: 2020-11-04

## 2020-11-03 RX ORDER — ISOSORBIDE MONONITRATE 30 MG/1
30 TABLET, EXTENDED RELEASE ORAL DAILY
Qty: 30 TABLET | Refills: 3 | Status: SHIPPED | OUTPATIENT
Start: 2020-11-04 | End: 2021-03-27

## 2020-11-03 RX ADMIN — HYDRALAZINE HYDROCHLORIDE 25 MG: 25 TABLET, FILM COATED ORAL at 09:44

## 2020-11-03 RX ADMIN — SODIUM CHLORIDE, PRESERVATIVE FREE 1 G: 5 INJECTION INTRAVENOUS at 12:22

## 2020-11-03 RX ADMIN — ENALAPRILAT 1.25 MG: 1.25 INJECTION INTRAVENOUS at 20:30

## 2020-11-03 RX ADMIN — PROMETHAZINE HYDROCHLORIDE 12.5 MG: 25 INJECTION INTRAMUSCULAR; INTRAVENOUS at 14:30

## 2020-11-03 RX ADMIN — ISOSORBIDE MONONITRATE 30 MG: 30 TABLET, EXTENDED RELEASE ORAL at 09:44

## 2020-11-03 RX ADMIN — ONDANSETRON 4 MG: 2 INJECTION INTRAMUSCULAR; INTRAVENOUS at 10:56

## 2020-11-03 RX ADMIN — HYDROCODONE BITARTRATE AND ACETAMINOPHEN 1 TABLET: 7.5; 325 TABLET ORAL at 12:21

## 2020-11-03 RX ADMIN — ONDANSETRON 4 MG: 2 INJECTION INTRAMUSCULAR; INTRAVENOUS at 20:14

## 2020-11-03 RX ADMIN — CEFEPIME HYDROCHLORIDE 2 G: 2 INJECTION, POWDER, FOR SOLUTION INTRAVENOUS at 20:33

## 2020-11-03 RX ADMIN — HYDRALAZINE HYDROCHLORIDE 25 MG: 25 TABLET, FILM COATED ORAL at 13:57

## 2020-11-03 RX ADMIN — ENOXAPARIN SODIUM 40 MG: 40 INJECTION SUBCUTANEOUS at 13:57

## 2020-11-03 RX ADMIN — SODIUM BICARBONATE 650 MG: 650 TABLET ORAL at 12:22

## 2020-11-03 RX ADMIN — SODIUM BICARBONATE: 84 INJECTION, SOLUTION INTRAVENOUS at 16:36

## 2020-11-03 RX ADMIN — ONDANSETRON 4 MG: 2 INJECTION INTRAMUSCULAR; INTRAVENOUS at 16:36

## 2020-11-03 RX ADMIN — PROMETHAZINE HYDROCHLORIDE 12.5 MG: 25 TABLET ORAL at 07:48

## 2020-11-03 RX ADMIN — SODIUM BICARBONATE 650 MG: 650 TABLET ORAL at 16:36

## 2020-11-03 ASSESSMENT — PAIN SCALES - GENERAL: PAINLEVEL_OUTOF10: 7

## 2020-11-03 NOTE — PROGRESS NOTES
[facility]    Patient:  Jaron Samuels  YOB: 1950  Date of Service: 11/3/2020  MRN: [id number]   Acct: [de-identified]   Primary Care Physician: Mannie Alaniz MD  Advance Directive: Full Code  Admit Date: 11/1/2020       Hospital Day: 2  Portions of this note have been copied forward, however, changed to reflect the most current clinical status of this patient. CHIEF COMPLAINT Weakness and falls    SUBJECTIVE:  Mrs. Cruz complains of nausea, vomiting today. Had a loose stool overnight. Denies chest pain, abdominal pain. States she just feels poorly in general.     Cumulative Hospital Course:   Patient is a 69year old female with PMH HTN, HLD, and back pain who presented to [address] [place] [address] ED complaining of weakness, dizziness and frequent falls.  gave history of confusion. She was admitted to Hospitalist service for REHANA and UTI and was continued on Rocephin and IVF's. Zestoretic and sedating medications discontinued. Hydralazine, Imdur added to medication regimen for blood pressure control. Review of Systems:   14 point review of systems is negative except as specifically addressed above. Objective:   VITALS:  /79   Pulse 98   Temp 98.6 °F (37 °C) (Temporal)   Resp 18   Ht 5' 2\" (1.575 m)   Wt 159 lb 12.8 oz (72.5 kg)   LMP  (LMP Unknown)   SpO2 96%   Breastfeeding No   BMI 29.23 kg/m²   24HR INTAKE/OUTPUT:      Intake/Output Summary (Last 24 hours) at 11/3/2020 1410  Last data filed at 11/3/2020 1356  Gross per 24 hour   Intake 740 ml   Output 500 ml   Net 240 ml     General appearance: 69year old, cachectic, no acute distress  Head: Normocephalic, without obvious abnormality, atraumatic  Eyes: conjunctivae/corneas clear. PERRL, EOM's intact.    Ears: normal external ears and nose, throat without exudate  Neck: no adenopathy, no carotid bruit, no JVD, supple, symmetrical, trachea midline   Lungs: clear throughout, no wheezes, rales or rhonchi   Heart: RRR, S1, S2 normal, no murmur  Abdomen:soft, non-tender; non-distended, normal bowel sounds no masses, no organomegaly  Extremities:No lower extremity edema,  No erythema, no tenderness to palpation  Skin: Skin color, texture, turgor normal. No rashes or lesions  Lymphatic: No palpable lymph node enlargment  Neurologic: Alert and oriented X 3,generalized weakness and normal tone. No focal deficits  Psychiatric: Alert and oriented, thought content appropriate, normal insight, mood appropriate    Medications:      IV infusion builder        hydrALAZINE  25 mg Oral 3 times per day    isosorbide mononitrate  30 mg Oral Daily    sodium bicarbonate  650 mg Oral 4x Daily    ondansetron  4 mg Intravenous Q6H    enoxaparin  40 mg Subcutaneous Q24H    promethazine  12.5 mg Intravenous Once    sodium chloride flush  10 mL Intravenous 2 times per day    cefTRIAXone (ROCEPHIN) IV  1 g Intravenous Q24H    nicotine  1 patch Transdermal Daily     sodium chloride flush, acetaminophen **OR** acetaminophen, promethazine **OR** ondansetron, acetaminophen **AND** diphenhydrAMINE  DIET RENAL;     Lab and other Data:     Recent Labs     11/01/20  1108 11/02/20  0137   WBC 9.8 7.4   HGB 10.7* 9.5*    277     Recent Labs     11/01/20  1108 11/01/20  1648 11/02/20  0137 11/03/20  0833     --  136 135*   K 5.1* 4.7 4.6 4.6     --  104 101   CO2 22  --  21* 19*   BUN 42*  --  38* 22   CREATININE 2.2*  --  1.7* 1.0*   GLUCOSE 116*  --  80 161*     Recent Labs     11/01/20  1108 11/02/20  0137   AST 10 11   ALT 9 8   BILITOT 0.3 <0.2   ALKPHOS 81 71     Troponin T:   Recent Labs     11/01/20  1108   TROPONINI <0.01     UA:  Recent Labs     11/01/20  1305   COLORU DARK YELLOW*   PHUR 5.0   WBCUA 52*   RBCUA 2   BACTERIA 4+*   CLARITYU CLOUDY*   SPECGRAV 1.019   LEUKOCYTESUR MODERATE*   UROBILINOGEN 1.0   BILIRUBINUR SMALL*   BLOODU Negative   GLUCOSEU Negative     RAD:   Xr Knee Right (1-2 Views)  1.  Status post previous right total knee arthroplasty. I do not see any evidence of complication. 2. Small joint effusion. Signed by Dr Aileen Barry on 11/1/2020 12:11 PM    Ct Head Wo Contrast  1. Mild cerebral and cerebellar volume loss with chronic microvascular disease but no evidence of acute intracranial process. 2. Mucous retention cysts or polyps within both maxillary sinuses. Signed by Dr Aileen Barry on 11/1/2020 12:32 PM    Ct Cervical Spine Wo Contrast  1. Status post fusion at the C5-C7 level with corpectomy at C6. No evidence of complication. 2. No evidence of fracture or listhesis. The disc space heights are otherwise well-maintained. No evidence of central or neural foraminal encroachment. . Signed by Dr Aileen Barry on 11/1/2020 12:36 PM    Ct Thoracic Spine Wo Contrast  No evidence of acute osseous injury in the thoracic spine. Signed by Dr Aileen Barry on 11/1/2020 12:40 PM    Xr Chest Portable   Bibasilar atelectasis. Lungs are otherwise clear. Signed by Dr Aileen Barry on 11/1/2020 11:43 AM    Micro: Urine culture collected 11/01/2020 Klebsiella pneumoniae     Assessment/Plan   Principal Problem:    REHANA (acute kidney injury) (HCC)-likely 2/2 dehydration, resolved    Active Problems:    Metabolic acidosis-new problem, Bicarbonate added, monitor       Acute cystitis without hematuria-Rocephin continued, urine culture growing Klebsiella      Frequent falls-PT/OT/SW following, sedating medications adusted, orthostatics unremarkable, HH consulted. Hypertension-Imdur, Hydralazine added       Osteoarthritis right knee-noted    Check KUB to r/o obstruction. Continue antiemetics. IVF's added with Bicarb due to no oral intake 2/2 vomiting.  Further orders per clinical course/attending     Antibiotic: Rocephin      DVT Prophylaxis: Lovenox    Georgie Boas, PA-C

## 2020-11-03 NOTE — PROGRESS NOTES
Pharmacy Renal Adjustment    Taylor Walsh is a 69 y.o. female. Pharmacy has renally adjusted medications per protocol. Recent Labs     11/02/20 0137 11/03/20  0833   BUN 38* 22       Recent Labs     11/02/20 0137 11/03/20  0833   CREATININE 1.7* 1.0*       Estimated Creatinine Clearance: 48 mL/min (A) (based on SCr of 1 mg/dL (H)).     Height:   Ht Readings from Last 1 Encounters:   11/01/20 5' 2\" (1.575 m)     Weight:  Wt Readings from Last 1 Encounters:   11/02/20 159 lb 12.8 oz (72.5 kg)       Plan: Adjust the following medications based on renal function:           Lovenox 30 mg SC every 24 hours to 40 mg SC every 24 hours    Electronically signed by VALENTE Samuel [facility] on 11/3/2020 at 1:04 PM

## 2020-11-03 NOTE — PROGRESS NOTES
11/03/20 1140   Restrictions/Precautions   Restrictions/Precautions Fall Risk   Required Braces or Orthoses? No   General   Chart Reviewed Yes   Subjective   Subjective Patient feeling nauseated agrees to try sit EOB   Pain Screening   Patient Currently in Pain No   Vital Signs   Level of Consciousness 0   Oxygen Therapy   O2 Device None (Room air)   Bed Mobility   Rolling Modified independent  (using BR)   Supine to Sit Minimal assistance   Comment Patient roled to sit became nauseated and dry heaving had to assist patient to supine scoot up in bed. Nursing notified   Ambulation   Ambulation?  No   Activity Tolerance   Activity Tolerance Treatment limited secondary to medical complications (free text)  (Patient nauseated)   Safety Devices   Type of devices Left in bed;Call light within reach   Physical Therapy    Electronically signed by Stalin Hdez PTA on 11/3/2020 at 11:47 AM

## 2020-11-04 ENCOUNTER — APPOINTMENT (OUTPATIENT)
Dept: GENERAL RADIOLOGY | Age: 70
DRG: 683 | End: 2020-11-04
Payer: MEDICARE

## 2020-11-04 LAB
ALBUMIN SERPL-MCNC: 4.3 G/DL (ref 3.5–5.2)
ALP BLD-CCNC: 71 U/L (ref 35–104)
ALT SERPL-CCNC: 9 U/L (ref 5–33)
ANION GAP SERPL CALCULATED.3IONS-SCNC: 21 MMOL/L (ref 7–19)
AST SERPL-CCNC: 14 U/L (ref 5–32)
BASOPHILS ABSOLUTE: 0 K/UL (ref 0–0.2)
BASOPHILS RELATIVE PERCENT: 0.1 % (ref 0–1)
BILIRUB SERPL-MCNC: 0.3 MG/DL (ref 0.2–1.2)
BUN BLDV-MCNC: 20 MG/DL (ref 8–23)
CALCIUM SERPL-MCNC: 9.1 MG/DL (ref 8.8–10.2)
CHLORIDE BLD-SCNC: 93 MMOL/L (ref 98–111)
CO2: 18 MMOL/L (ref 22–29)
CREAT SERPL-MCNC: 1 MG/DL (ref 0.5–0.9)
EOSINOPHILS ABSOLUTE: 0 K/UL (ref 0–0.6)
EOSINOPHILS RELATIVE PERCENT: 0.1 % (ref 0–5)
GFR AFRICAN AMERICAN: >59
GFR NON-AFRICAN AMERICAN: 55
GLUCOSE BLD-MCNC: 142 MG/DL (ref 74–109)
HCT VFR BLD CALC: 34.3 % (ref 37–47)
HEMOGLOBIN: 10.9 G/DL (ref 12–16)
IMMATURE GRANULOCYTES #: 0.1 K/UL
LYMPHOCYTES ABSOLUTE: 1.7 K/UL (ref 1.1–4.5)
LYMPHOCYTES RELATIVE PERCENT: 11.4 % (ref 20–40)
MCH RBC QN AUTO: 27.9 PG (ref 27–31)
MCHC RBC AUTO-ENTMCNC: 31.8 G/DL (ref 33–37)
MCV RBC AUTO: 87.9 FL (ref 81–99)
MONOCYTES ABSOLUTE: 0.6 K/UL (ref 0–0.9)
MONOCYTES RELATIVE PERCENT: 3.7 % (ref 0–10)
NEUTROPHILS ABSOLUTE: 12.6 K/UL (ref 1.5–7.5)
NEUTROPHILS RELATIVE PERCENT: 84 % (ref 50–65)
ORGANISM: ABNORMAL
ORGANISM: ABNORMAL
PDW BLD-RTO: 14.8 % (ref 11.5–14.5)
PLATELET # BLD: 305 K/UL (ref 130–400)
PMV BLD AUTO: 9.7 FL (ref 9.4–12.3)
POTASSIUM REFLEX MAGNESIUM: 4.5 MMOL/L (ref 3.5–5)
RBC # BLD: 3.9 M/UL (ref 4.2–5.4)
SODIUM BLD-SCNC: 132 MMOL/L (ref 136–145)
TOTAL PROTEIN: 6.9 G/DL (ref 6.6–8.7)
URINE CULTURE, ROUTINE: ABNORMAL
WBC # BLD: 15 K/UL (ref 4.8–10.8)

## 2020-11-04 PROCEDURE — 6360000002 HC RX W HCPCS: Performed by: HOSPITALIST

## 2020-11-04 PROCEDURE — 1210000000 HC MED SURG R&B

## 2020-11-04 PROCEDURE — 2500000003 HC RX 250 WO HCPCS: Performed by: HOSPITALIST

## 2020-11-04 PROCEDURE — 6360000002 HC RX W HCPCS: Performed by: PHYSICIAN ASSISTANT

## 2020-11-04 PROCEDURE — 36415 COLL VENOUS BLD VENIPUNCTURE: CPT

## 2020-11-04 PROCEDURE — 85025 COMPLETE CBC W/AUTO DIFF WBC: CPT

## 2020-11-04 PROCEDURE — 71045 X-RAY EXAM CHEST 1 VIEW: CPT

## 2020-11-04 PROCEDURE — 2580000003 HC RX 258: Performed by: HOSPITALIST

## 2020-11-04 PROCEDURE — 6370000000 HC RX 637 (ALT 250 FOR IP): Performed by: PHYSICIAN ASSISTANT

## 2020-11-04 PROCEDURE — 80053 COMPREHEN METABOLIC PANEL: CPT

## 2020-11-04 RX ORDER — METOPROLOL TARTRATE 5 MG/5ML
2.5 INJECTION INTRAVENOUS ONCE
Status: COMPLETED | OUTPATIENT
Start: 2020-11-04 | End: 2020-11-04

## 2020-11-04 RX ORDER — ENALAPRILAT 2.5 MG/2ML
1.25 INJECTION INTRAVENOUS EVERY 6 HOURS SCHEDULED
Status: DISCONTINUED | OUTPATIENT
Start: 2020-11-04 | End: 2020-11-05

## 2020-11-04 RX ORDER — MORPHINE SULFATE 4 MG/ML
2 INJECTION, SOLUTION INTRAMUSCULAR; INTRAVENOUS EVERY 4 HOURS PRN
Status: DISCONTINUED | OUTPATIENT
Start: 2020-11-04 | End: 2020-11-09 | Stop reason: HOSPADM

## 2020-11-04 RX ORDER — ENALAPRILAT 2.5 MG/2ML
1.25 INJECTION INTRAVENOUS EVERY 6 HOURS SCHEDULED
Status: DISCONTINUED | OUTPATIENT
Start: 2020-11-04 | End: 2020-11-04

## 2020-11-04 RX ORDER — PROMETHAZINE HYDROCHLORIDE 25 MG/ML
12.5 INJECTION, SOLUTION INTRAMUSCULAR; INTRAVENOUS EVERY 6 HOURS PRN
Status: DISCONTINUED | OUTPATIENT
Start: 2020-11-04 | End: 2020-11-04

## 2020-11-04 RX ORDER — METOCLOPRAMIDE HYDROCHLORIDE 5 MG/ML
5 INJECTION INTRAMUSCULAR; INTRAVENOUS EVERY 6 HOURS
Status: DISCONTINUED | OUTPATIENT
Start: 2020-11-04 | End: 2020-11-06

## 2020-11-04 RX ADMIN — METOCLOPRAMIDE 5 MG: 5 INJECTION, SOLUTION INTRAMUSCULAR; INTRAVENOUS at 16:01

## 2020-11-04 RX ADMIN — METOCLOPRAMIDE 5 MG: 5 INJECTION, SOLUTION INTRAMUSCULAR; INTRAVENOUS at 22:15

## 2020-11-04 RX ADMIN — ENOXAPARIN SODIUM 40 MG: 40 INJECTION SUBCUTANEOUS at 15:58

## 2020-11-04 RX ADMIN — SODIUM CHLORIDE, PRESERVATIVE FREE 1 G: 5 INJECTION INTRAVENOUS at 13:05

## 2020-11-04 RX ADMIN — ENALAPRILAT 1.25 MG: 1.25 INJECTION INTRAVENOUS at 13:06

## 2020-11-04 RX ADMIN — PROMETHAZINE HYDROCHLORIDE 12.5 MG: 25 INJECTION INTRAMUSCULAR; INTRAVENOUS at 05:11

## 2020-11-04 RX ADMIN — SODIUM BICARBONATE: 84 INJECTION, SOLUTION INTRAVENOUS at 08:42

## 2020-11-04 RX ADMIN — METOCLOPRAMIDE 5 MG: 5 INJECTION, SOLUTION INTRAMUSCULAR; INTRAVENOUS at 13:06

## 2020-11-04 RX ADMIN — ENALAPRILAT 1.25 MG: 1.25 INJECTION INTRAVENOUS at 05:11

## 2020-11-04 RX ADMIN — SODIUM BICARBONATE: 84 INJECTION, SOLUTION INTRAVENOUS at 22:15

## 2020-11-04 RX ADMIN — METOPROLOL TARTRATE 2.5 MG: 5 INJECTION INTRAVENOUS at 22:20

## 2020-11-04 RX ADMIN — ONDANSETRON 4 MG: 2 INJECTION INTRAMUSCULAR; INTRAVENOUS at 01:46

## 2020-11-04 RX ADMIN — ONDANSETRON 4 MG: 2 INJECTION INTRAMUSCULAR; INTRAVENOUS at 22:15

## 2020-11-04 RX ADMIN — MORPHINE SULFATE 2 MG: 4 INJECTION, SOLUTION INTRAMUSCULAR; INTRAVENOUS at 15:58

## 2020-11-04 RX ADMIN — ENALAPRILAT 1.25 MG: 1.25 INJECTION INTRAVENOUS at 01:46

## 2020-11-04 RX ADMIN — ONDANSETRON 4 MG: 2 INJECTION INTRAMUSCULAR; INTRAVENOUS at 18:24

## 2020-11-04 RX ADMIN — ONDANSETRON 4 MG: 2 INJECTION INTRAMUSCULAR; INTRAVENOUS at 10:35

## 2020-11-04 ASSESSMENT — PAIN SCALES - GENERAL: PAINLEVEL_OUTOF10: 9

## 2020-11-04 NOTE — PROGRESS NOTES
11/03/2020  Findings suggest developing adynamic ileus. Xr Knee Right (1-2 Views)  1. Status post previous right total knee arthroplasty. I do not see any evidence of complication. 2. Small joint effusion. Signed by Dr Yolanda Bruner on 11/1/2020 12:11 PM    Ct Head Wo Contrast  1. Mild cerebral and cerebellar volume loss with chronic microvascular disease but no evidence of acute intracranial process. 2. Mucous retention cysts or polyps within both maxillary sinuses. Signed by Dr Yolanda Bruner on 11/1/2020 12:32 PM    Ct Cervical Spine Wo Contrast  1. Status post fusion at the C5-C7 level with corpectomy at C6. No evidence of complication. 2. No evidence of fracture or listhesis. The disc space heights are otherwise well-maintained. No evidence of central or neural foraminal encroachment. . Signed by Dr Yolanda Bruner on 11/1/2020 12:36 PM    Ct Thoracic Spine Wo Contrast  No evidence of acute osseous injury in the thoracic spine. Signed by Dr Yolanda Bruner on 11/1/2020 12:40 PM    Xr Chest Portable   Bibasilar atelectasis. Lungs are otherwise clear.  Signed by Dr Yolanda Bruner on 11/1/2020 11:43 AM    Micro: Urine culture collected 11/01/2020 Klebsiella pneumoniae   Klebsiella pneumoniae (1)   Antibiotic  Interpretation  PEDRO  Status    ampicillin  Resistant  >=32  mcg/mL     aztreonam  Sensitive  <=1  mcg/mL     ceFAZolin  Sensitive  <=4  mcg/mL     cefepime  Sensitive  <=1  mcg/mL     cefTRIAXone  Sensitive  <=1  mcg/mL     ertapenem  Sensitive  <=0.5  mcg/mL     gentamicin  Sensitive  <=1  mcg/mL     levofloxacin  Sensitive  <=0.12  mcg/mL     meropenem  Sensitive  <=0.25  mcg/mL     nitrofurantoin  Resistant  128  mcg/mL     piperacillin-tazobactam  Sensitive  <=4  mcg/mL     trimethoprim-sulfamethoxazole  Sensitive  <=20  mcg/mL       Klebsiella pneumoniae (2)   Antibiotic  Interpretation  PEDRO  Status    ampicillin  Resistant  >=32  mcg/mL     aztreonam  Sensitive  <=1  mcg/mL     ceFAZolin  Sensitive  <=4 mcg/mL     cefepime  Sensitive  <=1  mcg/mL     cefTRIAXone  Sensitive  <=1  mcg/mL     ertapenem  Sensitive  <=0.5  mcg/mL     gentamicin  Sensitive  <=1  mcg/mL     levofloxacin  Sensitive  <=0.12  mcg/mL     meropenem  Sensitive  <=0.25  mcg/mL     nitrofurantoin  Resistant  128  mcg/mL     piperacillin-tazobactam  Sensitive  <=4  mcg/mL     trimethoprim-sulfamethoxazole  Sensitive  <=20  mcg/mL      Assessment/Plan   Principal Problem:    REHANA (acute kidney injury) (HCC)-likely 2/2 dehydration, resolved with IVF's    Active Problems:    Ileus-new problem, continue antiemetics, keep NPO, add NGT, daily KUB, increase activity as tolerated      Metabolic acidosis-continue Bicarbonate        Acute cystitis without hematuria-Rocephin continued based on sensitivities       Frequent falls-PT/OT/SW following, sedating medications adusted, orthostatics unremarkable, HH consulted.       Hypertension-Imdur, Hydralazine, IV Vasotec added       Osteoarthritis right knee-noted    Discussed with attending, further orders per clinical course/attending    Antibiotic: Rocephin      DVT Prophylaxis: Lovenox    Venessa Romero PA-C

## 2020-11-04 NOTE — PROGRESS NOTES
Physical Therapy  Name: Donte Tena  MRN:  [id number]  Date of service:  11/4/2020 11/04/20 1406   Subjective   Subjective Attempt: Pt nauseated and unable to participate with therapy at this time.          Electronically signed by Santos Johnston PTA on 11/4/2020 at 2:08 PM

## 2020-11-05 ENCOUNTER — APPOINTMENT (OUTPATIENT)
Dept: GENERAL RADIOLOGY | Age: 70
DRG: 683 | End: 2020-11-05
Payer: MEDICARE

## 2020-11-05 LAB
ANION GAP SERPL CALCULATED.3IONS-SCNC: 16 MMOL/L (ref 7–19)
BUN BLDV-MCNC: 16 MG/DL (ref 8–23)
CALCIUM SERPL-MCNC: 8.8 MG/DL (ref 8.8–10.2)
CHLORIDE BLD-SCNC: 88 MMOL/L (ref 98–111)
CO2: 31 MMOL/L (ref 22–29)
CREAT SERPL-MCNC: 0.8 MG/DL (ref 0.5–0.9)
GFR AFRICAN AMERICAN: >59
GFR NON-AFRICAN AMERICAN: >60
GLUCOSE BLD-MCNC: 222 MG/DL (ref 74–109)
HCT VFR BLD CALC: 34.7 % (ref 37–47)
HEMOGLOBIN: 11.6 G/DL (ref 12–16)
LACTIC ACID: 1.3 MMOL/L (ref 0.5–1.9)
MAGNESIUM: 1.7 MG/DL (ref 1.6–2.4)
MCH RBC QN AUTO: 28 PG (ref 27–31)
MCHC RBC AUTO-ENTMCNC: 33.4 G/DL (ref 33–37)
MCV RBC AUTO: 83.8 FL (ref 81–99)
PDW BLD-RTO: 14.7 % (ref 11.5–14.5)
PLATELET # BLD: 330 K/UL (ref 130–400)
PMV BLD AUTO: 9.9 FL (ref 9.4–12.3)
POTASSIUM REFLEX MAGNESIUM: 2.8 MMOL/L (ref 3.5–5)
PROCALCITONIN: 0.1 NG/ML (ref 0–0.09)
RBC # BLD: 4.14 M/UL (ref 4.2–5.4)
SODIUM BLD-SCNC: 135 MMOL/L (ref 136–145)
WBC # BLD: 17.7 K/UL (ref 4.8–10.8)

## 2020-11-05 PROCEDURE — 6360000002 HC RX W HCPCS: Performed by: PHYSICIAN ASSISTANT

## 2020-11-05 PROCEDURE — 83735 ASSAY OF MAGNESIUM: CPT

## 2020-11-05 PROCEDURE — 6370000000 HC RX 637 (ALT 250 FOR IP): Performed by: PHYSICIAN ASSISTANT

## 2020-11-05 PROCEDURE — 2580000003 HC RX 258: Performed by: PHYSICIAN ASSISTANT

## 2020-11-05 PROCEDURE — 80048 BASIC METABOLIC PNL TOTAL CA: CPT

## 2020-11-05 PROCEDURE — 1210000000 HC MED SURG R&B

## 2020-11-05 PROCEDURE — 2500000003 HC RX 250 WO HCPCS: Performed by: HOSPITALIST

## 2020-11-05 PROCEDURE — 36415 COLL VENOUS BLD VENIPUNCTURE: CPT

## 2020-11-05 PROCEDURE — 83605 ASSAY OF LACTIC ACID: CPT

## 2020-11-05 PROCEDURE — 85027 COMPLETE CBC AUTOMATED: CPT

## 2020-11-05 PROCEDURE — 6360000002 HC RX W HCPCS: Performed by: HOSPITALIST

## 2020-11-05 PROCEDURE — 84145 PROCALCITONIN (PCT): CPT

## 2020-11-05 PROCEDURE — 71045 X-RAY EXAM CHEST 1 VIEW: CPT

## 2020-11-05 PROCEDURE — 74018 RADEX ABDOMEN 1 VIEW: CPT

## 2020-11-05 RX ORDER — POTASSIUM CHLORIDE 7.45 MG/ML
10 INJECTION INTRAVENOUS PRN
Status: DISCONTINUED | OUTPATIENT
Start: 2020-11-05 | End: 2020-11-08

## 2020-11-05 RX ORDER — HYDRALAZINE HYDROCHLORIDE 20 MG/ML
5 INJECTION INTRAMUSCULAR; INTRAVENOUS EVERY 6 HOURS PRN
Status: DISCONTINUED | OUTPATIENT
Start: 2020-11-05 | End: 2020-11-05

## 2020-11-05 RX ORDER — SODIUM CHLORIDE 9 MG/ML
INJECTION, SOLUTION INTRAVENOUS CONTINUOUS
Status: DISCONTINUED | OUTPATIENT
Start: 2020-11-05 | End: 2020-11-05

## 2020-11-05 RX ORDER — SODIUM CHLORIDE AND POTASSIUM CHLORIDE .9; .15 G/100ML; G/100ML
SOLUTION INTRAVENOUS CONTINUOUS
Status: DISCONTINUED | OUTPATIENT
Start: 2020-11-05 | End: 2020-11-08

## 2020-11-05 RX ORDER — LABETALOL HYDROCHLORIDE 5 MG/ML
10 INJECTION, SOLUTION INTRAVENOUS EVERY 4 HOURS PRN
Status: DISCONTINUED | OUTPATIENT
Start: 2020-11-05 | End: 2020-11-08

## 2020-11-05 RX ORDER — BISACODYL 10 MG
10 SUPPOSITORY, RECTAL RECTAL DAILY
Status: DISCONTINUED | OUTPATIENT
Start: 2020-11-05 | End: 2020-11-07

## 2020-11-05 RX ORDER — METOPROLOL TARTRATE 5 MG/5ML
5 INJECTION INTRAVENOUS EVERY 6 HOURS
Status: DISCONTINUED | OUTPATIENT
Start: 2020-11-05 | End: 2020-11-06

## 2020-11-05 RX ADMIN — POTASSIUM CHLORIDE 10 MEQ: 7.46 INJECTION, SOLUTION INTRAVENOUS at 18:33

## 2020-11-05 RX ADMIN — METOCLOPRAMIDE 5 MG: 5 INJECTION, SOLUTION INTRAMUSCULAR; INTRAVENOUS at 04:25

## 2020-11-05 RX ADMIN — SODIUM CHLORIDE: 9 INJECTION, SOLUTION INTRAVENOUS at 10:02

## 2020-11-05 RX ADMIN — METOPROLOL TARTRATE 5 MG: 5 INJECTION, SOLUTION INTRAVENOUS at 04:25

## 2020-11-05 RX ADMIN — POTASSIUM CHLORIDE 10 MEQ: 7.46 INJECTION, SOLUTION INTRAVENOUS at 20:38

## 2020-11-05 RX ADMIN — POTASSIUM CHLORIDE 10 MEQ: 7.46 INJECTION, SOLUTION INTRAVENOUS at 12:54

## 2020-11-05 RX ADMIN — POTASSIUM CHLORIDE 10 MEQ: 7.46 INJECTION, SOLUTION INTRAVENOUS at 10:44

## 2020-11-05 RX ADMIN — METOCLOPRAMIDE 5 MG: 5 INJECTION, SOLUTION INTRAMUSCULAR; INTRAVENOUS at 16:26

## 2020-11-05 RX ADMIN — ONDANSETRON 4 MG: 2 INJECTION INTRAMUSCULAR; INTRAVENOUS at 17:26

## 2020-11-05 RX ADMIN — ONDANSETRON 4 MG: 2 INJECTION INTRAMUSCULAR; INTRAVENOUS at 10:44

## 2020-11-05 RX ADMIN — ENOXAPARIN SODIUM 40 MG: 40 INJECTION SUBCUTANEOUS at 14:27

## 2020-11-05 RX ADMIN — POTASSIUM CHLORIDE AND SODIUM CHLORIDE: 900; 150 INJECTION, SOLUTION INTRAVENOUS at 14:27

## 2020-11-05 RX ADMIN — METOCLOPRAMIDE 5 MG: 5 INJECTION, SOLUTION INTRAMUSCULAR; INTRAVENOUS at 09:45

## 2020-11-05 RX ADMIN — BISACODYL 10 MG: 10 SUPPOSITORY RECTAL at 16:26

## 2020-11-05 RX ADMIN — METOPROLOL TARTRATE 5 MG: 5 INJECTION, SOLUTION INTRAVENOUS at 10:44

## 2020-11-05 RX ADMIN — ONDANSETRON 4 MG: 2 INJECTION INTRAMUSCULAR; INTRAVENOUS at 22:02

## 2020-11-05 RX ADMIN — ONDANSETRON 4 MG: 2 INJECTION INTRAMUSCULAR; INTRAVENOUS at 04:25

## 2020-11-05 RX ADMIN — POTASSIUM CHLORIDE 10 MEQ: 7.46 INJECTION, SOLUTION INTRAVENOUS at 16:34

## 2020-11-05 RX ADMIN — SODIUM CHLORIDE, PRESERVATIVE FREE 10 ML: 5 INJECTION INTRAVENOUS at 22:08

## 2020-11-05 RX ADMIN — Medication 10 MG: at 19:05

## 2020-11-05 RX ADMIN — METOPROLOL TARTRATE 5 MG: 5 INJECTION, SOLUTION INTRAVENOUS at 22:02

## 2020-11-05 RX ADMIN — METOPROLOL TARTRATE 5 MG: 5 INJECTION, SOLUTION INTRAVENOUS at 16:25

## 2020-11-05 RX ADMIN — POTASSIUM CHLORIDE 10 MEQ: 7.46 INJECTION, SOLUTION INTRAVENOUS at 14:19

## 2020-11-05 RX ADMIN — METOCLOPRAMIDE 5 MG: 5 INJECTION, SOLUTION INTRAMUSCULAR; INTRAVENOUS at 22:02

## 2020-11-05 NOTE — PROGRESS NOTES
Physical Therapy  [facility] [place]  [id number]     11/05/20 4048   Subjective   Subjective Attempt, patient in restraints at this time. Will cont to follow.    Electronically signed by Calvin Berg PTA on 11/5/2020 at 9:18 AM

## 2020-11-05 NOTE — PROGRESS NOTES
[id number] [facility]    Patient:  Skye Aj  YOB: 1950  Date of Service: 11/5/2020  MRN: [id number]   Acct: [de-identified]   Primary Care Physician: Kelly Crane MD  Advance Directive: Full Code  Admit Date: 11/1/2020       Hospital Day: 4  Portions of this note have been copied forward, however, changed to reflect the most current clinical status of this patient. CHIEF COMPLAINT Weakness and falls    SUBJECTIVE:  Mrs. Morena Ramirez is slightly confused today and was pulling on IV/NGT overnight, therefore, restraints were ordered. She states she feels hot and nauseated. Denies cough, dyspnea, abdominal pain. Cumulative Hospital Course:   Patient is a 69year old female with PMH HTN, HLD, and back pain who presented to [facility] ED complaining of weakness, dizziness and frequent falls.  gave history of confusion. She was admitted to Hospitalist service for REHANA and UTI and was continued on Rocephin and IVF's. Zestoretic and sedating medications discontinued. Hydralazine, Imdur added to medication regimen for blood pressure control. She then developed severe nausea and vomiting. KUB concerning for Ileus. NGT placed. Review of Systems:   14 point review of systems is negative except as specifically addressed above. Objective:   VITALS:  BP (!) 187/96   Pulse 79   Temp 96.8 °F (36 °C) (Temporal)   Resp 16   Ht 5' 2\" (1.575 m)   Wt 161 lb 3.2 oz (73.1 kg)   LMP  (LMP Unknown)   SpO2 92%   Breastfeeding No   BMI 29.48 kg/m²   24HR INTAKE/OUTPUT:      Intake/Output Summary (Last 24 hours) at 11/5/2020 1250  Last data filed at 11/5/2020 1027  Gross per 24 hour   Intake 3431 ml   Output 350 ml   Net 3081 ml     General appearance: 69year old, no acute distress, resting comfortably in bed with restraints applied loosely   Head: Normocephalic, without obvious abnormality, atraumatic  Eyes: conjunctivae/corneas clear. PERRL, EOM's intact.    Ears: normal external ears and nose, throat without previous right total knee arthroplasty. I do not see any evidence of complication. 2. Small joint effusion. Signed by Dr Pat Eisenberg on 11/1/2020 12:11 PM    Ct Head Wo Contrast  1. Mild cerebral and cerebellar volume loss with chronic microvascular disease but no evidence of acute intracranial process. 2. Mucous retention cysts or polyps within both maxillary sinuses. Signed by Dr Pat Eisenberg on 11/1/2020 12:32 PM    Ct Cervical Spine Wo Contrast  1. Status post fusion at the C5-C7 level with corpectomy at C6. No evidence of complication. 2. No evidence of fracture or listhesis. The disc space heights are otherwise well-maintained. No evidence of central or neural foraminal encroachment. . Signed by Dr Pat Eisenberg on 11/1/2020 12:36 PM    Ct Thoracic Spine Wo Contrast  No evidence of acute osseous injury in the thoracic spine. Signed by Dr Pat Eisenberg on 11/1/2020 12:40 PM    Xr Chest Portable   Bibasilar atelectasis. Lungs are otherwise clear.  Signed by Dr Pat Eisenberg on 11/1/2020 11:43 AM    Micro: Urine culture collected 11/01/2020 Klebsiella pneumoniae   Klebsiella pneumoniae (1)   Antibiotic  Interpretation  PEDRO  Status    ampicillin  Resistant  >=32  mcg/mL     aztreonam  Sensitive  <=1  mcg/mL     ceFAZolin  Sensitive  <=4  mcg/mL     cefepime  Sensitive  <=1  mcg/mL     cefTRIAXone  Sensitive  <=1  mcg/mL     ertapenem  Sensitive  <=0.5  mcg/mL     gentamicin  Sensitive  <=1  mcg/mL     levofloxacin  Sensitive  <=0.12  mcg/mL     meropenem  Sensitive  <=0.25  mcg/mL     nitrofurantoin  Resistant  128  mcg/mL     piperacillin-tazobactam  Sensitive  <=4  mcg/mL     trimethoprim-sulfamethoxazole  Sensitive  <=20  mcg/mL       Klebsiella pneumoniae (2)   Antibiotic  Interpretation  PEDRO  Status    ampicillin  Resistant  >=32  mcg/mL     aztreonam  Sensitive  <=1  mcg/mL     ceFAZolin  Sensitive  <=4  mcg/mL     cefepime  Sensitive  <=1  mcg/mL     cefTRIAXone  Sensitive  <=1  mcg/mL     ertapenem Sensitive  <=0.5  mcg/mL     gentamicin  Sensitive  <=1  mcg/mL     levofloxacin  Sensitive  <=0.12  mcg/mL     meropenem  Sensitive  <=0.25  mcg/mL     nitrofurantoin  Resistant  128  mcg/mL     piperacillin-tazobactam  Sensitive  <=4  mcg/mL     trimethoprim-sulfamethoxazole  Sensitive  <=20  mcg/mL      Assessment/Plan   Principal Problem:    REHANA (acute kidney injury) (HCC)-likely 2/2 dehydration/UTI, resolved with IVF's    Active Problems:    Ileus-continue NGT tube, daily KUB, increase activity, add Dulcolax suppository       Leukocytosis-completed 3 days Rocephin for uncomplicated UTI, CXR unremarkable, monitor closely. Check Lactic acid/Procalcitonin       Metabolic acidosis-resolved, stop Bicarbonate         Hypokalemia-continue Sodium chloride with potassium supplementation, sliding scale potassium ordered as well       Acute cystitis without hematuria 2/2 Klebsiella-completed 3 days of Rocephin      Frequent falls-PT/OT/SW following, sedating medications adusted, orthostatics unremarkable, HH consulted. Hypertension-scheduled IV Lopressor, prn Labetalol       Osteoarthritis right knee-noted    Further orders per clinical course/attending.  Await procalcitonin/lactic acid     DVT Prophylaxis: Lovenox    Em Cowan PA-C

## 2020-11-06 ENCOUNTER — APPOINTMENT (OUTPATIENT)
Dept: GENERAL RADIOLOGY | Age: 70
DRG: 683 | End: 2020-11-06
Payer: MEDICARE

## 2020-11-06 LAB
ANION GAP SERPL CALCULATED.3IONS-SCNC: 15 MMOL/L (ref 7–19)
BUN BLDV-MCNC: 16 MG/DL (ref 8–23)
CALCIUM SERPL-MCNC: 8.5 MG/DL (ref 8.8–10.2)
CHLORIDE BLD-SCNC: 95 MMOL/L (ref 98–111)
CO2: 24 MMOL/L (ref 22–29)
CREAT SERPL-MCNC: 0.8 MG/DL (ref 0.5–0.9)
GFR AFRICAN AMERICAN: >59
GFR NON-AFRICAN AMERICAN: >60
GLUCOSE BLD-MCNC: 157 MG/DL (ref 74–109)
HCT VFR BLD CALC: 32 % (ref 37–47)
HEMOGLOBIN: 10.5 G/DL (ref 12–16)
MAGNESIUM: 1.7 MG/DL (ref 1.6–2.4)
MCH RBC QN AUTO: 28.2 PG (ref 27–31)
MCHC RBC AUTO-ENTMCNC: 32.8 G/DL (ref 33–37)
MCV RBC AUTO: 86 FL (ref 81–99)
PDW BLD-RTO: 14.7 % (ref 11.5–14.5)
PLATELET # BLD: 307 K/UL (ref 130–400)
PMV BLD AUTO: 10.2 FL (ref 9.4–12.3)
POTASSIUM REFLEX MAGNESIUM: 3.4 MMOL/L (ref 3.5–5)
POTASSIUM SERPL-SCNC: 3.6 MMOL/L (ref 3.5–5)
POTASSIUM SERPL-SCNC: 4 MMOL/L (ref 3.5–5)
RBC # BLD: 3.72 M/UL (ref 4.2–5.4)
SODIUM BLD-SCNC: 134 MMOL/L (ref 136–145)
WBC # BLD: 14.6 K/UL (ref 4.8–10.8)

## 2020-11-06 PROCEDURE — 6370000000 HC RX 637 (ALT 250 FOR IP): Performed by: PHYSICIAN ASSISTANT

## 2020-11-06 PROCEDURE — 6370000000 HC RX 637 (ALT 250 FOR IP): Performed by: HOSPITALIST

## 2020-11-06 PROCEDURE — 74018 RADEX ABDOMEN 1 VIEW: CPT

## 2020-11-06 PROCEDURE — 36415 COLL VENOUS BLD VENIPUNCTURE: CPT

## 2020-11-06 PROCEDURE — 83735 ASSAY OF MAGNESIUM: CPT

## 2020-11-06 PROCEDURE — 85027 COMPLETE CBC AUTOMATED: CPT

## 2020-11-06 PROCEDURE — 84132 ASSAY OF SERUM POTASSIUM: CPT

## 2020-11-06 PROCEDURE — 2500000003 HC RX 250 WO HCPCS: Performed by: HOSPITALIST

## 2020-11-06 PROCEDURE — 80048 BASIC METABOLIC PNL TOTAL CA: CPT

## 2020-11-06 PROCEDURE — 6360000002 HC RX W HCPCS: Performed by: PHYSICIAN ASSISTANT

## 2020-11-06 PROCEDURE — 1210000000 HC MED SURG R&B

## 2020-11-06 PROCEDURE — 6360000002 HC RX W HCPCS: Performed by: HOSPITALIST

## 2020-11-06 RX ORDER — CARVEDILOL 6.25 MG/1
6.25 TABLET ORAL 2 TIMES DAILY WITH MEALS
Status: DISCONTINUED | OUTPATIENT
Start: 2020-11-06 | End: 2020-11-07

## 2020-11-06 RX ADMIN — POTASSIUM CHLORIDE AND SODIUM CHLORIDE: 900; 150 INJECTION, SOLUTION INTRAVENOUS at 01:41

## 2020-11-06 RX ADMIN — ONDANSETRON 4 MG: 2 INJECTION INTRAMUSCULAR; INTRAVENOUS at 22:52

## 2020-11-06 RX ADMIN — METOPROLOL TARTRATE 5 MG: 5 INJECTION, SOLUTION INTRAVENOUS at 04:29

## 2020-11-06 RX ADMIN — POTASSIUM CHLORIDE 10 MEQ: 7.46 INJECTION, SOLUTION INTRAVENOUS at 14:57

## 2020-11-06 RX ADMIN — BISACODYL 10 MG: 10 SUPPOSITORY RECTAL at 08:26

## 2020-11-06 RX ADMIN — METOCLOPRAMIDE 5 MG: 5 INJECTION, SOLUTION INTRAMUSCULAR; INTRAVENOUS at 10:22

## 2020-11-06 RX ADMIN — POTASSIUM CHLORIDE 10 MEQ: 7.46 INJECTION, SOLUTION INTRAVENOUS at 12:44

## 2020-11-06 RX ADMIN — METOPROLOL TARTRATE 5 MG: 5 INJECTION, SOLUTION INTRAVENOUS at 10:22

## 2020-11-06 RX ADMIN — METOCLOPRAMIDE 5 MG: 5 INJECTION, SOLUTION INTRAMUSCULAR; INTRAVENOUS at 04:29

## 2020-11-06 RX ADMIN — POTASSIUM CHLORIDE 10 MEQ: 7.46 INJECTION, SOLUTION INTRAVENOUS at 08:26

## 2020-11-06 RX ADMIN — ISOSORBIDE MONONITRATE 30 MG: 30 TABLET, EXTENDED RELEASE ORAL at 08:26

## 2020-11-06 RX ADMIN — CARVEDILOL 6.25 MG: 6.25 TABLET, FILM COATED ORAL at 16:44

## 2020-11-06 RX ADMIN — POTASSIUM CHLORIDE 10 MEQ: 7.46 INJECTION, SOLUTION INTRAVENOUS at 10:34

## 2020-11-06 RX ADMIN — ONDANSETRON 4 MG: 2 INJECTION INTRAMUSCULAR; INTRAVENOUS at 11:38

## 2020-11-06 RX ADMIN — POTASSIUM CHLORIDE AND SODIUM CHLORIDE: 900; 150 INJECTION, SOLUTION INTRAVENOUS at 16:35

## 2020-11-06 RX ADMIN — ONDANSETRON 4 MG: 2 INJECTION INTRAMUSCULAR; INTRAVENOUS at 04:29

## 2020-11-06 RX ADMIN — ONDANSETRON 4 MG: 2 INJECTION INTRAMUSCULAR; INTRAVENOUS at 16:44

## 2020-11-06 RX ADMIN — ENOXAPARIN SODIUM 40 MG: 40 INJECTION SUBCUTANEOUS at 14:57

## 2020-11-06 ASSESSMENT — PAIN DESCRIPTION - LOCATION: LOCATION: KNEE

## 2020-11-06 ASSESSMENT — PAIN SCALES - GENERAL: PAINLEVEL_OUTOF10: 3

## 2020-11-06 NOTE — PROGRESS NOTES
[facility]    Patient:  Skye Aj  YOB: 1950  Date of Service: 11/6/2020  MRN: [id number]   Acct: [de-identified]   Primary Care Physician: Kelly Crane MD  Advance Directive: Full Code  Admit Date: 11/1/2020       Hospital Day: 5  Portions of this note have been copied forward, however, changed to reflect the most current clinical status of this patient. CHIEF COMPLAINT Weakness and falls    SUBJECTIVE:  Mrs. Morena Ramirez has no new complaints. Self-removed NG yesterday. Is now passing flatus and has had several bowel movements. Denies abdominal pain. Remains weak and feels dizzy upon standing. Spouse at bedside. Plan remains to DC home with [facility] once stable. Has tolerated clear liquids thus far today. Would like to try full liquids at dinner. Cumulative Hospital Course:   Patient is a 69year old female with PMH HTN, HLD, and back pain who presented to [address] [place] [address] ED complaining of weakness, dizziness and frequent falls.  gave history of confusion. She was admitted to Hospitalist service for REHANA and UTI and was continued on Rocephin and IVF's. Zestoretic and sedating medications discontinued. Hydralazine, Imdur added to medication regimen for blood pressure control. She then developed severe nausea and vomiting. KUB concerning for Ileus. NGT placed. IV Reglan and dulcolax suppository continued. Patient self-removed NGT on 11/05/2020. KUB 11/06/2020 revealed resolution of Ileus and she was started on a clear liquid diet. Review of Systems:   14 point review of systems is negative except as specifically addressed above.     Objective:   VITALS:  BP (!) 160/77   Pulse 88   Temp 96.5 °F (35.8 °C) (Temporal)   Resp 18   Ht 5' 2\" (1.575 m)   Wt 161 lb 3.2 oz (73.1 kg)   LMP  (LMP Unknown)   SpO2 97%   Breastfeeding No   BMI 29.48 kg/m²   24HR INTAKE/OUTPUT:      Intake/Output Summary (Last 24 hours) at 11/6/2020 1352  Last data filed at 11/6/2020 0841  Gross per 24 hour   Intake 4724.33 ml   Output 350 ml   Net 4374.33 ml     General appearance: 69year old, no acute distress, sitting up comfortably in bed, no acute distress   Head: Normocephalic, without obvious abnormality, atraumatic  Eyes: conjunctivae/corneas clear. PERRL, EOM's intact. Ears: normal external ears and nose, throat without exudate  Neck: no adenopathy, no carotid bruit, no JVD, supple, symmetrical, trachea midline   Lungs: non labored, equal and adequate bilaterally without rales or rhonchi  Heart: RRR, S1, S2 normal, no murmur  Abdomen: soft, non-tender; mildly distended, normoative bowel sounds no masses, no organomegaly  Extremities: No lower extremity edema,  No erythema, no tenderness to palpation  Skin: Skin color, texture, turgor normal. No rashes or lesions  Lymphatic: No palpable lymph node enlargment  Neurologic: Alert and oriented person, year, place. Generalized weakness and normal tone.  No focal deficits  Psychiatric: Calm, appropriate affect, slightly confused at times    Medications:      0.9% NaCl with KCl 20 mEq 50 mL/hr at 11/06/20 0840      carvedilol  6.25 mg Oral BID WC    bisacodyl  10 mg Rectal Daily    metoclopramide  5 mg Intravenous Q6H    isosorbide mononitrate  30 mg Oral Daily    ondansetron  4 mg Intravenous Q6H    enoxaparin  40 mg Subcutaneous Q24H    sodium chloride flush  10 mL Intravenous 2 times per day    nicotine  1 patch Transdermal Daily     labetalol, potassium chloride, morphine, sodium chloride flush, acetaminophen **OR** acetaminophen, acetaminophen **AND** diphenhydrAMINE  DIET FULL LIQUID;     Lab and other Data:     Recent Labs     11/04/20  0254 11/05/20  0809 11/06/20  0437   WBC 15.0* 17.7* 14.6*   HGB 10.9* 11.6* 10.5*    330 307     Recent Labs     11/04/20  0254 11/05/20  0809 11/05/20  2350 11/06/20  0437   * 135*  --  134*   K 4.5 2.8* 3.6 3.4*   CL 93* 88*  --  95*   CO2 18* 31*  --  24   BUN 20 16  --  16   CREATININE 1.0* 0.8  --  0.8 GLUCOSE 142* 222*  --  157*     Recent Labs     11/04/20  0254   AST 14   ALT 9   BILITOT 0.3   ALKPHOS 71     RAD:   KUB 11/06/2020  No evidence of adynamic ileus, essentially normal bowel   gas pattern. CXR 11/05/2020  1. Minimal atelectasis in the lung bases. 2. NG tube in good position. CXR 11/04/2020  Satisfactory position of the nasogastric tube. KUB 11/03/2020  Findings suggest developing adynamic ileus. Xr Knee Right (1-2 Views)  1. Status post previous right total knee arthroplasty. I do not see any evidence of complication. 2. Small joint effusion. Signed by Dr Surinder Rowley on 11/1/2020 12:11 PM    Ct Head Wo Contrast  1. Mild cerebral and cerebellar volume loss with chronic microvascular disease but no evidence of acute intracranial process. 2. Mucous retention cysts or polyps within both maxillary sinuses. Signed by Dr Surinder Rowley on 11/1/2020 12:32 PM    Ct Cervical Spine Wo Contrast  1. Status post fusion at the C5-C7 level with corpectomy at C6. No evidence of complication. 2. No evidence of fracture or listhesis. The disc space heights are otherwise well-maintained. No evidence of central or neural foraminal encroachment. . Signed by Dr Surinder Rowley on 11/1/2020 12:36 PM    Ct Thoracic Spine Wo Contrast  No evidence of acute osseous injury in the thoracic spine. Signed by Dr Surinder Rowley on 11/1/2020 12:40 PM    Xr Chest Portable  Bibasilar atelectasis. Lungs are otherwise clear.  Signed by Dr Surinder Rowley on 11/1/2020 11:43 AM    Micro: Urine culture collected 11/01/2020 Klebsiella pneumoniae   Klebsiella pneumoniae (1)   Antibiotic  Interpretation  PEDRO  Status    ampicillin  Resistant  >=32  mcg/mL     aztreonam  Sensitive  <=1  mcg/mL     ceFAZolin  Sensitive  <=4  mcg/mL     cefepime  Sensitive  <=1  mcg/mL     cefTRIAXone  Sensitive  <=1  mcg/mL     ertapenem  Sensitive  <=0.5  mcg/mL     gentamicin  Sensitive  <=1  mcg/mL     levofloxacin  Sensitive  <=0.12  mcg/mL meropenem  Sensitive  <=0.25  mcg/mL     nitrofurantoin  Resistant  128  mcg/mL     piperacillin-tazobactam  Sensitive  <=4  mcg/mL     trimethoprim-sulfamethoxazole  Sensitive  <=20  mcg/mL       Klebsiella pneumoniae (2)   Antibiotic  Interpretation  PEDRO  Status    ampicillin  Resistant  >=32  mcg/mL     aztreonam  Sensitive  <=1  mcg/mL     ceFAZolin  Sensitive  <=4  mcg/mL     cefepime  Sensitive  <=1  mcg/mL     cefTRIAXone  Sensitive  <=1  mcg/mL     ertapenem  Sensitive  <=0.5  mcg/mL     gentamicin  Sensitive  <=1  mcg/mL     levofloxacin  Sensitive  <=0.12  mcg/mL     meropenem  Sensitive  <=0.25  mcg/mL     nitrofurantoin  Resistant  128  mcg/mL     piperacillin-tazobactam  Sensitive  <=4  mcg/mL     trimethoprim-sulfamethoxazole  Sensitive  <=20  mcg/mL      Assessment/Plan   Principal Problem:    REHANA (acute kidney injury) (HCC)-likely 2/2 dehydration/UTI, resolved    Active Problems:    Ileus-patient self removed NGT 11/05/2020. KUB with resolution of ileus this morning. She has tolerated clears for breakfast/lunch. Will advance to fulls this evening      Leukocytosis-completed 3 days Rocephin for uncomplicated UTI, CXR unremarkable, improving, procalcitonin/lactic acid unremarkable 88/82/6882      Metabolic acidosis-resolved      Hypokalemia-continue Sodium chloride with potassium supplementation, sliding scale potassium ordered as well, receiving replacement      Acute cystitis without hematuria 2/2 Klebsiella-completed 3 days of Rocephin      Frequent falls-PT/OT/SW following, sedating medications adusted, orthostatics unremarkable, plans for DC home with [facility]      Hypertension-continue Imdur, IV Lopressor DC'd, PO BB added, monitor      Osteoarthritis right knee-noted    If continued improvement consider DC in next 24-48 hours.  Further orders per clinical course/attending    DVT Prophylaxis: Chuck Nava PA-C

## 2020-11-06 NOTE — PLAN OF CARE
Problem: Falls - Risk of:  Goal: Will remain free from falls  Description: Will remain free from falls  11/5/2020 2333 by Phoebe Monge RN  Outcome: Ongoing  11/5/2020 1518 by Sobia Thomas RN  Outcome: Ongoing  Goal: Absence of physical injury  Description: Absence of physical injury  11/5/2020 2333 by Phoebe Monge RN  Outcome: Ongoing  11/5/2020 1518 by Sobia Thomas RN  Outcome: Ongoing

## 2020-11-07 ENCOUNTER — APPOINTMENT (OUTPATIENT)
Dept: GENERAL RADIOLOGY | Age: 70
DRG: 683 | End: 2020-11-07
Payer: MEDICARE

## 2020-11-07 LAB
ANION GAP SERPL CALCULATED.3IONS-SCNC: 16 MMOL/L (ref 7–19)
BUN BLDV-MCNC: 19 MG/DL (ref 8–23)
CALCIUM SERPL-MCNC: 8.3 MG/DL (ref 8.8–10.2)
CHLORIDE BLD-SCNC: 100 MMOL/L (ref 98–111)
CO2: 21 MMOL/L (ref 22–29)
CREAT SERPL-MCNC: 0.7 MG/DL (ref 0.5–0.9)
GFR AFRICAN AMERICAN: >59
GFR NON-AFRICAN AMERICAN: >60
GLUCOSE BLD-MCNC: 130 MG/DL (ref 74–109)
HCT VFR BLD CALC: 32.2 % (ref 37–47)
HEMOGLOBIN: 10.3 G/DL (ref 12–16)
MCH RBC QN AUTO: 27.8 PG (ref 27–31)
MCHC RBC AUTO-ENTMCNC: 32 G/DL (ref 33–37)
MCV RBC AUTO: 86.8 FL (ref 81–99)
PDW BLD-RTO: 14.5 % (ref 11.5–14.5)
PLATELET # BLD: 288 K/UL (ref 130–400)
PMV BLD AUTO: 10.8 FL (ref 9.4–12.3)
POTASSIUM REFLEX MAGNESIUM: 3.8 MMOL/L (ref 3.5–5)
RBC # BLD: 3.71 M/UL (ref 4.2–5.4)
SODIUM BLD-SCNC: 137 MMOL/L (ref 136–145)
WBC # BLD: 13.2 K/UL (ref 4.8–10.8)

## 2020-11-07 PROCEDURE — 6370000000 HC RX 637 (ALT 250 FOR IP): Performed by: PHYSICIAN ASSISTANT

## 2020-11-07 PROCEDURE — 36415 COLL VENOUS BLD VENIPUNCTURE: CPT

## 2020-11-07 PROCEDURE — 6360000002 HC RX W HCPCS: Performed by: PHYSICIAN ASSISTANT

## 2020-11-07 PROCEDURE — 6360000002 HC RX W HCPCS: Performed by: HOSPITALIST

## 2020-11-07 PROCEDURE — 6370000000 HC RX 637 (ALT 250 FOR IP): Performed by: HOSPITALIST

## 2020-11-07 PROCEDURE — 85027 COMPLETE CBC AUTOMATED: CPT

## 2020-11-07 PROCEDURE — 74018 RADEX ABDOMEN 1 VIEW: CPT

## 2020-11-07 PROCEDURE — 80048 BASIC METABOLIC PNL TOTAL CA: CPT

## 2020-11-07 PROCEDURE — 1210000000 HC MED SURG R&B

## 2020-11-07 RX ORDER — DOCUSATE SODIUM 100 MG/1
100 CAPSULE, LIQUID FILLED ORAL 2 TIMES DAILY
Status: DISCONTINUED | OUTPATIENT
Start: 2020-11-07 | End: 2020-11-09 | Stop reason: HOSPADM

## 2020-11-07 RX ORDER — POLYETHYLENE GLYCOL 3350 17 G/17G
17 POWDER, FOR SOLUTION ORAL DAILY
Status: DISCONTINUED | OUTPATIENT
Start: 2020-11-07 | End: 2020-11-09 | Stop reason: HOSPADM

## 2020-11-07 RX ORDER — ALPRAZOLAM 1 MG/1
1 TABLET ORAL 4 TIMES DAILY PRN
Status: DISCONTINUED | OUTPATIENT
Start: 2020-11-07 | End: 2020-11-09 | Stop reason: HOSPADM

## 2020-11-07 RX ORDER — BISACODYL 10 MG
10 SUPPOSITORY, RECTAL RECTAL DAILY PRN
Status: DISCONTINUED | OUTPATIENT
Start: 2020-11-07 | End: 2020-11-09 | Stop reason: HOSPADM

## 2020-11-07 RX ORDER — CARVEDILOL 12.5 MG/1
12.5 TABLET ORAL 2 TIMES DAILY WITH MEALS
Status: DISCONTINUED | OUTPATIENT
Start: 2020-11-07 | End: 2020-11-09 | Stop reason: HOSPADM

## 2020-11-07 RX ADMIN — DOCUSATE SODIUM 100 MG: 100 CAPSULE, LIQUID FILLED ORAL at 17:32

## 2020-11-07 RX ADMIN — DOCUSATE SODIUM 100 MG: 100 CAPSULE, LIQUID FILLED ORAL at 21:10

## 2020-11-07 RX ADMIN — CARVEDILOL 12.5 MG: 12.5 TABLET, FILM COATED ORAL at 17:32

## 2020-11-07 RX ADMIN — ONDANSETRON 4 MG: 2 INJECTION INTRAMUSCULAR; INTRAVENOUS at 04:08

## 2020-11-07 RX ADMIN — ENOXAPARIN SODIUM 40 MG: 40 INJECTION SUBCUTANEOUS at 17:32

## 2020-11-07 RX ADMIN — POTASSIUM CHLORIDE AND SODIUM CHLORIDE: 900; 150 INJECTION, SOLUTION INTRAVENOUS at 17:32

## 2020-11-07 RX ADMIN — ALPRAZOLAM 1 MG: 1 TABLET ORAL at 14:36

## 2020-11-07 RX ADMIN — ONDANSETRON 4 MG: 2 INJECTION INTRAMUSCULAR; INTRAVENOUS at 17:32

## 2020-11-07 RX ADMIN — CARVEDILOL 6.25 MG: 6.25 TABLET, FILM COATED ORAL at 09:52

## 2020-11-07 RX ADMIN — ISOSORBIDE MONONITRATE 30 MG: 30 TABLET, EXTENDED RELEASE ORAL at 09:52

## 2020-11-07 RX ADMIN — ONDANSETRON 4 MG: 2 INJECTION INTRAMUSCULAR; INTRAVENOUS at 09:52

## 2020-11-07 RX ADMIN — ONDANSETRON 4 MG: 2 INJECTION INTRAMUSCULAR; INTRAVENOUS at 23:24

## 2020-11-07 NOTE — PROGRESS NOTES
[facility]    Patient:  Andrew Kraft  YOB: 1950  Date of Service: 11/7/2020  MRN: [id number]   Acct: [de-identified]   Primary Care Physician: Danielle Gomez MD  Advance Directive: Full Code  Admit Date: 11/1/2020       Hospital Day: 6  Portions of this note have been copied forward, however, changed to reflect the most current clinical status of this patient. CHIEF COMPLAINT Weakness and falls    SUBJECTIVE:  Mrs. Cruz complains of anxiety this morning. Additionally would like to try solid food. She continues to pass flatus and have bowel movements. Feels mildly nauseated and has attributed this to anxiety and not having Xanax for several days. Agrees to sit up to chair all meals and increase activity. Cumulative Hospital Course:   Patient is a 69year old female with PMH HTN, HLD, and back pain who presented to [facility] ED complaining of weakness, dizziness and frequent falls.  gave history of confusion. She was admitted to Hospitalist service for REHANA and UTI and was continued on Rocephin and IVF's. Zestoretic and sedating medications discontinued. Hydralazine, Imdur added to medication regimen for blood pressure control. She then developed severe nausea and vomiting. KUB concerning for Ileus. NGT placed. IV Reglan and dulcolax suppository continued. Patient self-removed NGT on 11/05/2020. KUB 11/06/2020 revealed resolution of Ileus and she was started on a clear liquid diet. Diet further advanced on 11/07/2020. Xanax resumed. Review of Systems:   14 point review of systems is negative except as specifically addressed above.     Objective:   VITALS:  BP (!) 154/85   Pulse 83   Temp 96.9 °F (36.1 °C) (Temporal)   Resp 18   Ht 5' 2\" (1.575 m)   Wt 161 lb 3.2 oz (73.1 kg)   LMP  (LMP Unknown)   SpO2 93%   Breastfeeding No   BMI 29.48 kg/m²   24HR INTAKE/OUTPUT:      Intake/Output Summary (Last 24 hours) at 11/7/2020 1410  Last data filed at 11/7/2020 0409  Gross per 24 hour Intake 2708 ml   Output 250 ml   Net 2458 ml     General appearance: 69year old, no acute distress, resting comfortably in supine position in bed   Head: Normocephalic, without obvious abnormality, atraumatic  Eyes: conjunctivae/corneas clear. PERRL, EOM's intact. Ears: normal external ears and nose, throat without exudate  Neck: no adenopathy, no carotid bruit, no JVD, supple, symmetrical, trachea midline   Lungs: clear throughout, no wheezes rales or rhonchi  Heart: regular rate and rhythm, S1, S2 normal, no murmur  Abdomen: soft, non-tender; mildly distended, normoative bowel sounds no masses, no organomegaly  Extremities: no lower extremity edema,  No erythema, no tenderness to palpation  Skin: Skin color, texture, turgor normal. No rashes or lesions  Lymphatic: No palpable lymph node enlargment  Neurologic: Alert and oriented person, year, place. Generalized weakness and normal tone.  No focal deficits  Psychiatric: Anxious, tearful     Medications:      0.9% NaCl with KCl 20 mEq 50 mL/hr at 11/06/20 1635      carvedilol  6.25 mg Oral BID WC    bisacodyl  10 mg Rectal Daily    isosorbide mononitrate  30 mg Oral Daily    ondansetron  4 mg Intravenous Q6H    enoxaparin  40 mg Subcutaneous Q24H    sodium chloride flush  10 mL Intravenous 2 times per day    nicotine  1 patch Transdermal Daily     ALPRAZolam, labetalol, potassium chloride, morphine, sodium chloride flush, acetaminophen **OR** acetaminophen, acetaminophen **AND** diphenhydrAMINE  DIET GENERAL; Dental Soft     Lab and other Data:     Recent Labs     11/05/20  0809 11/06/20 0437 11/07/20  0350   WBC 17.7* 14.6* 13.2*   HGB 11.6* 10.5* 10.3*    307 288     Recent Labs     11/05/20  0809  11/06/20 0437 11/06/20  1927 11/07/20  0350   *  --  134*  --  137   K 2.8*   < > 3.4* 4.0 3.8   CL 88*  --  95*  --  100   CO2 31*  --  24  --  21*   BUN 16  --  16  --  19   CREATININE 0.8  --  0.8  --  0.7   GLUCOSE 222*  --  157*  --  130* < > = values in this interval not displayed. RAD:   KUB 11/07/2020  Stable appearance of the abdomen. KUB 11/06/2020  No evidence of adynamic ileus, essentially normal bowel   gas pattern. CXR 11/05/2020  1. Minimal atelectasis in the lung bases. 2. NG tube in good position. CXR 11/04/2020  Satisfactory position of the nasogastric tube. KUB 11/03/2020  Findings suggest developing adynamic ileus. Xr Knee Right (1-2 Views)  1. Status post previous right total knee arthroplasty. I do not see any evidence of complication. 2. Small joint effusion. Signed by Dr Pat Eisenberg on 11/1/2020 12:11 PM    Ct Head Wo Contrast  1. Mild cerebral and cerebellar volume loss with chronic microvascular disease but no evidence of acute intracranial process. 2. Mucous retention cysts or polyps within both maxillary sinuses. Signed by Dr Pat Eisenberg on 11/1/2020 12:32 PM    Ct Cervical Spine Wo Contrast  1. Status post fusion at the C5-C7 level with corpectomy at C6. No evidence of complication. 2. No evidence of fracture or listhesis. The disc space heights are otherwise well-maintained. No evidence of central or neural foraminal encroachment. . Signed by Dr Pat Eisenberg on 11/1/2020 12:36 PM    Ct Thoracic Spine Wo Contrast  No evidence of acute osseous injury in the thoracic spine. Signed by Dr Pat Eisenberg on 11/1/2020 12:40 PM    Xr Chest Portable  Bibasilar atelectasis. Lungs are otherwise clear.  Signed by Dr Pat Eisenberg on 11/1/2020 11:43 AM    Micro: Urine culture collected 11/01/2020 Klebsiella pneumoniae   Klebsiella pneumoniae (1)   Antibiotic  Interpretation  PEDRO  Status    ampicillin  Resistant  >=32  mcg/mL     aztreonam  Sensitive  <=1  mcg/mL     ceFAZolin  Sensitive  <=4  mcg/mL     cefepime  Sensitive  <=1  mcg/mL     cefTRIAXone  Sensitive  <=1  mcg/mL     ertapenem  Sensitive  <=0.5  mcg/mL     gentamicin  Sensitive  <=1  mcg/mL     levofloxacin  Sensitive  <=0.12  mcg/mL     meropenem Sensitive  <=0.25  mcg/mL     nitrofurantoin  Resistant  128  mcg/mL     piperacillin-tazobactam  Sensitive  <=4  mcg/mL     trimethoprim-sulfamethoxazole  Sensitive  <=20  mcg/mL       Klebsiella pneumoniae (2)   Antibiotic  Interpretation  PEDRO  Status    ampicillin  Resistant  >=32  mcg/mL     aztreonam  Sensitive  <=1  mcg/mL     ceFAZolin  Sensitive  <=4  mcg/mL     cefepime  Sensitive  <=1  mcg/mL     cefTRIAXone  Sensitive  <=1  mcg/mL     ertapenem  Sensitive  <=0.5  mcg/mL     gentamicin  Sensitive  <=1  mcg/mL     levofloxacin  Sensitive  <=0.12  mcg/mL     meropenem  Sensitive  <=0.25  mcg/mL     nitrofurantoin  Resistant  128  mcg/mL     piperacillin-tazobactam  Sensitive  <=4  mcg/mL     trimethoprim-sulfamethoxazole  Sensitive  <=20  mcg/mL      Assessment/Plan   Principal Problem:    REHANA (acute kidney injury) (HCC)-2/2 dehydration, resolved    Active Problems:    Ileus-patient self removed NGT 11/05/2020. KUB stable this AM. Will stop daily imaging. Patient tolerating fulls, diet advanced this AM      Leukocytosis-completed 3 days Rocephin for uncomplicated UTI, CXR unremarkable, continued improvement procalcitonin/lactic acid unremarkable 10/94/4381      Metabolic acidosis-mild, monitor       Hypokalemia-resolved       Acute cystitis without hematuria 2/2 Klebsiella-completed 3 days of Rocephin      Frequent falls-PT/OT/SW following, orthostatics unremarkable, plans for DC home with [facility]      Hypertension-continue Imdur, IV Lopressor DC'd, increase Coreg      Osteoarthritis right knee-noted      Anxiety-Xanax resumed    Discussed with patient and spouse need to be up to chair, increase activity.      DVT Prophylaxis: Lovenox    Jorden Stark PA-C

## 2020-11-07 NOTE — PROGRESS NOTES
Comprehensive Nutrition Assessment    Type and Reason for Visit:  Initial, RD Nutrition Re-Screen/LOS    Nutrition Recommendations/Plan: continue to follow for advancing diet and tolerance    Nutrition Assessment:  Pt is adequately nourished AEB fat and muscle mass. Diet was advanced to flull liquid--taking some of breakfast tray. Was asleep and did not awaken to name or knock on door. Malnutrition Assessment:  Malnutrition Status:  No malnutrition    Context:  Acute Illness     Findings of the 6 clinical characteristics of malnutrition:  Energy Intake:  Mild decrease in energy intake (Comment)  Weight Loss:  No significant weight loss     Body Fat Loss:  No significant body fat loss     Muscle Mass Loss:  No significant muscle mass loss    Fluid Accumulation:  7 - Moderate to Severe Extremities   Strength:  Not Performed    Nutrition Related Findings:  adequately nourished      Wounds:  Skin Tears       Current Nutrition Therapies:    DIET GENERAL; Dental Soft    Anthropometric Measures:  · Height: 5' 2\" (157.5 cm)  · Current Body Weight: 161 lb 3 oz (73.1 kg)   · Admission Body Weight: 161 lb (73 kg)    · Usual Body Weight:       · Ideal Body Weight: 110 lbs; % Ideal Body Weight 146.5 %   · BMI: 29.5  · Adjusted Body Weight:  ; No Adjustment   · BMI Categories: Overweight (BMI 25.0-29. 9)       Nutrition Diagnosis:   · Inadequate protein-energy intake related to acute injury/trauma, altered GI function as evidenced by intake 0-25%, intake 26-50%, localized or generalized fluid accumulation      Nutrition Interventions:   Food and/or Nutrient Delivery:  Continue Current Diet  Nutrition Education/Counseling:  No recommendation at this time   Coordination of Nutrition Care:  Continue to monitor while inpatient    Goals:  po intake 50% or greater of advanced diet.   Weight stable       Nutrition Monitoring and Evaluation:   Behavioral-Environmental Outcomes:  None Identified   Food/Nutrient Intake Outcomes: Diet Advancement/Tolerance, Food and Nutrient Intake  Physical Signs/Symptoms Outcomes:  Biochemical Data, Skin, Weight     Discharge Planning:     Too soon to determine     Electronically signed by Janusz Williamson, MS, RD, LD on 11/7/20 at 12:43 PM CST    Contact: [phone]

## 2020-11-08 LAB
ANION GAP SERPL CALCULATED.3IONS-SCNC: 17 MMOL/L (ref 7–19)
BUN BLDV-MCNC: 21 MG/DL (ref 8–23)
CALCIUM SERPL-MCNC: 8.5 MG/DL (ref 8.8–10.2)
CHLORIDE BLD-SCNC: 100 MMOL/L (ref 98–111)
CO2: 21 MMOL/L (ref 22–29)
CREAT SERPL-MCNC: 0.8 MG/DL (ref 0.5–0.9)
GFR AFRICAN AMERICAN: >59
GFR NON-AFRICAN AMERICAN: >60
GLUCOSE BLD-MCNC: 104 MG/DL (ref 74–109)
HCT VFR BLD CALC: 33 % (ref 37–47)
HEMOGLOBIN: 10.3 G/DL (ref 12–16)
MCH RBC QN AUTO: 28.1 PG (ref 27–31)
MCHC RBC AUTO-ENTMCNC: 31.2 G/DL (ref 33–37)
MCV RBC AUTO: 89.9 FL (ref 81–99)
PDW BLD-RTO: 14.5 % (ref 11.5–14.5)
PLATELET # BLD: 282 K/UL (ref 130–400)
PMV BLD AUTO: 10.6 FL (ref 9.4–12.3)
POTASSIUM REFLEX MAGNESIUM: 4.1 MMOL/L (ref 3.5–5)
RBC # BLD: 3.67 M/UL (ref 4.2–5.4)
SODIUM BLD-SCNC: 138 MMOL/L (ref 136–145)
WBC # BLD: 13.7 K/UL (ref 4.8–10.8)

## 2020-11-08 PROCEDURE — 2580000003 HC RX 258: Performed by: PHYSICIAN ASSISTANT

## 2020-11-08 PROCEDURE — 6360000002 HC RX W HCPCS: Performed by: HOSPITALIST

## 2020-11-08 PROCEDURE — 6360000002 HC RX W HCPCS: Performed by: PHYSICIAN ASSISTANT

## 2020-11-08 PROCEDURE — 1210000000 HC MED SURG R&B

## 2020-11-08 PROCEDURE — 36415 COLL VENOUS BLD VENIPUNCTURE: CPT

## 2020-11-08 PROCEDURE — 80048 BASIC METABOLIC PNL TOTAL CA: CPT

## 2020-11-08 PROCEDURE — 6370000000 HC RX 637 (ALT 250 FOR IP): Performed by: HOSPITALIST

## 2020-11-08 PROCEDURE — 6370000000 HC RX 637 (ALT 250 FOR IP): Performed by: PHYSICIAN ASSISTANT

## 2020-11-08 PROCEDURE — 85027 COMPLETE CBC AUTOMATED: CPT

## 2020-11-08 RX ORDER — ONDANSETRON 4 MG/1
4 TABLET, FILM COATED ORAL EVERY 6 HOURS PRN
Status: DISCONTINUED | OUTPATIENT
Start: 2020-11-08 | End: 2020-11-09 | Stop reason: HOSPADM

## 2020-11-08 RX ORDER — CARVEDILOL 12.5 MG/1
12.5 TABLET ORAL 2 TIMES DAILY WITH MEALS
Qty: 60 TABLET | Refills: 0 | Status: SHIPPED | OUTPATIENT
Start: 2020-11-08

## 2020-11-08 RX ADMIN — DOCUSATE SODIUM 100 MG: 100 CAPSULE, LIQUID FILLED ORAL at 21:06

## 2020-11-08 RX ADMIN — ONDANSETRON 4 MG: 4 TABLET, FILM COATED ORAL at 17:22

## 2020-11-08 RX ADMIN — ISOSORBIDE MONONITRATE 30 MG: 30 TABLET, EXTENDED RELEASE ORAL at 07:52

## 2020-11-08 RX ADMIN — ALPRAZOLAM 1 MG: 1 TABLET ORAL at 21:07

## 2020-11-08 RX ADMIN — ONDANSETRON 4 MG: 2 INJECTION INTRAMUSCULAR; INTRAVENOUS at 05:49

## 2020-11-08 RX ADMIN — ONDANSETRON 4 MG: 2 INJECTION INTRAMUSCULAR; INTRAVENOUS at 11:35

## 2020-11-08 RX ADMIN — CARVEDILOL 12.5 MG: 12.5 TABLET, FILM COATED ORAL at 17:21

## 2020-11-08 RX ADMIN — CARVEDILOL 12.5 MG: 12.5 TABLET, FILM COATED ORAL at 07:52

## 2020-11-08 RX ADMIN — ACETAMINOPHEN 650 MG: 325 TABLET ORAL at 21:06

## 2020-11-08 RX ADMIN — SODIUM CHLORIDE, PRESERVATIVE FREE 10 ML: 5 INJECTION INTRAVENOUS at 08:00

## 2020-11-08 RX ADMIN — DOCUSATE SODIUM 100 MG: 100 CAPSULE, LIQUID FILLED ORAL at 07:52

## 2020-11-08 RX ADMIN — ENOXAPARIN SODIUM 40 MG: 40 INJECTION SUBCUTANEOUS at 15:08

## 2020-11-08 RX ADMIN — POLYETHYLENE GLYCOL 3350 17 G: 17 POWDER, FOR SOLUTION ORAL at 07:51

## 2020-11-08 ASSESSMENT — PAIN SCALES - GENERAL: PAINLEVEL_OUTOF10: 6

## 2020-11-08 NOTE — PLAN OF CARE
Problem: Restraint Use - Nonviolent/Non-Self-Destructive Behavior:  Goal: Absence of restraint indications  Description: Absence of restraint indications  11/8/2020 0634 by Philomena Talavera RN  Outcome: Completed  11/8/2020 3521 by Philomena Talavera RN  Outcome: Ongoing  Goal: Absence of restraint-related injury  Description: Absence of restraint-related injury  11/8/2020 0634 by Philomena Talavera RN  Outcome: Completed  11/8/2020 9774 by Philomena Talavera RN  Outcome: Ongoing

## 2020-11-08 NOTE — CARE COORDINATION
CSN                                    Req/Control # [Problem retrieving Specimen ID]                                   Order Date:  2020  [id number]                                          Patient Information      Name:  Royce Paredes  :  1950  Age:  69 y.o. Address:  [place], [place]   Zip:  [address]  PCP: Efraín Simmons MD Sex:  F  SSN: [id number]  Home Phone: [phone]  Work Phone:    Patient MRN:  [id number]    Alt Patient ID:  [id number]  PCP Phone: [phone]       Authorizing Provider Information       AUTHORIZING PROVIDER: Dudley Salinas PA-C  Physician ID: [id number]  NPI:  [id number]  Site:   Address: [address] [place] [address],[facility] [address]  [address] [address] Yoli Cervantes   Phone: [phone]  Fax: [phone]               EQUIPMENT ORDERED  DME Order for Bedside Commode as OP [AZV26] (ORD   #:   [id number]) Priority  Routine Class  Hospital Performed        Associated Diagnosis:          Comments:    You must complete the order parameters below and add the medical necessity documentation for this DME in a separate note.     Commode Chair with Fixed Arms     Current patient weight: Weight: 155 lb 1.6 oz (70.4 kg)  Current patient height: Height: 5' 2\" (157.5 cm)  Diagnosis: Generalized weakness  Duration: Purchase            Scheduling Instructions:                                 Specimen Source             Collection Date    Collection Time    Order Status    Expected Date                Electronically Signed By  Dudley Salinas PA-C Date  2020           Responsible Party Esteban Lai-ActID   Relationship Account Type Home Phone   [id number] [address] / [address] P/F [phone]   Employer   Work [address] [place] [address]  Insurance/Subscriber ID:  [id number] Name:  Jennifer Rachel Relationship to Patient: Self     Secondary Insurance  Insurance/Subscriber ID: [id number]  [address] Name: Larwance Mcburney  Relationship to Patient: Self  Signed ABN: N    Payor Name:  MEDICARE   Plan:  MEDICARE PART A AND B   Group:         Payor Name: [employer] [place]  Plan:  [employer] [place] MEDICARE SUPP     Worker's Comp Date of Injury:                 CSN                                    Req/Control # [Problem retrieving Specimen ID]                                   Order Date:  2020  [id number]                                          Patient Information      Name:  Cecile Skinner  :  1950  Age:  69 y.o. Address:  [place], [place]   Zip:  [address]  PCP: Graham Narayan MD Sex:  F  SSN: [id number]  Home Phone: [phone]  Work Phone:    Patient MRN:  [id number]    Alt Patient ID:  [id number]  PCP Phone: [phone]       Authorizing Provider Information       AUTHORIZING PROVIDER: Miguel Brooks PA-C  Physician ID: [id number]  NPI:  [id number]  Site:   [facility] [address] [facility]  Phone: [phone]  Fax: [phone]               EQUIPMENT ORDERED  DME Order for Keegan Mayes (ORD   #:   [id number]) Priority  Routine Class  Hospital Performed        Associated Diagnosis:          Comments:    You must complete the order parameters below and add the medical necessity documentation for this DME in a separate note.     Folding Walker with Wheels     Current patient weight: Weight: 155 lb 1.6 oz (70.4 kg)  Current patient height: Height: 5' 2\" (157.5 cm)  Diagnosis: Generalized weakness  Duration: Purchase            Scheduling Instructions:                                 Specimen Source             Collection Date    Collection Time    Order Status    Expected Date                Electronically Signed By  Miguel Brooks PA-C Date  2020           Responsible Party Betty Metzger   Guar-ActID   Relationship Account Type Home Phone   Tabitha Rowley [id number] [address] [place] [address] / Elia Zapata [address] [facility] P/F [phone]   Employer   Work Phone   RETIRED                  Insurance & Policy Liang Information     Primary Insurance  Insurance/Subscriber ID:  [id number]  Subscriber Name:  Lynn Wilson              Relationship to Patient: Self     Secondary Insurance  Insurance/Subscriber ID: [id number]  Subscriber Name: Lynn Wilson  Relationship to Patient: Self  Signed ABN: N    Payor Name:  MEDICARE   Plan:  MEDICARE PART A AND B   Group:         Payor Name: [employer] [place]  Plan:  [employer] [place] MEDICARE SUPP     Worker's Comp Date of Injury:

## 2020-11-08 NOTE — PLAN OF CARE
Problem: Falls - Risk of:  Goal: Will remain free from falls  Description: Will remain free from falls  Outcome: Ongoing  Goal: Absence of physical injury  Description: Absence of physical injury  Outcome: Ongoing     Problem: Skin Integrity:  Goal: Will show no infection signs and symptoms  Description: Will show no infection signs and symptoms  Outcome: Ongoing  Goal: Absence of new skin breakdown  Description: Absence of new skin breakdown  Outcome: Ongoing     Problem: Pain:  Description: Pain management should include both nonpharmacologic and pharmacologic interventions. Goal: Pain level will decrease  Description: Pain level will decrease  Outcome: Ongoing  Goal: Control of acute pain  Description: Control of acute pain  Outcome: Ongoing  Goal: Control of chronic pain  Description: Control of chronic pain  Outcome: Ongoing     Problem: Pain:  Description: Pain management should include both nonpharmacologic and pharmacologic interventions.   Goal: Pain level will decrease  Description: Pain level will decrease  Outcome: Ongoing  Goal: Control of acute pain  Description: Control of acute pain  Outcome: Ongoing  Goal: Control of chronic pain  Description: Control of chronic pain  Outcome: Ongoing

## 2020-11-08 NOTE — CARE COORDINATION
Faxed DME orders to [facility]. Asked them to please deliver to pts home address per nurse.     [address]

## 2020-11-08 NOTE — DISCHARGE SUMMARY
Wendie Anderson  :  1950  MRN:  [id number]    Admit date:  2020  Discharge date:  2020    Discharging Physician:  Chica Rivas MD    Advance Directive: Full Code    Consults: None    Primary Care Physician:  Zackary Cobb MD    Discharge Diagnoses:    Principal Problem:    REHANA (acute kidney injury) [facility])  Active Problems:    Primary osteoarthritis of right knee    Acute cystitis without hematuria    Frequent falls    Hypertension  Resolved Problems:    * No resolved hospital problems. *    Portions of this note have been copied forward, however, changed to reflect the most current clinical status of this patient. Hospital Course:     Patient is a 69year old female with PMH HTN, HLD, and back pain who presented to [facility] ED complaining of weakness, dizziness and frequent falls.  gave history of confusion. She was admitted to Hospitalist service for ERHANA and UTI and was continued on Rocephin and IVF's. Zestoretic and sedating medications discontinued. Hydralazine, Imdur, Coreg added to medication regimen for blood pressure control. She then developed severe nausea and vomiting. KUB concerning for Ileus. NGT placed. IV Reglan and dulcolax suppository continued. Patient self-removed NGT on 2020. KUB 2020 revealed resolution of Ileus and she was started on a clear liquid diet. Diet further advanced on 2020. Xanax resumed. She has been recommended to continue over the counter colace twice daily and miralax daily or as needed to prevent constipation. At this time home health has been set up. She has been ordered a walker and bedside commode. She is stable for discharge home with [facility] at this time. KUB 2020  Stable appearance of the abdomen.     KUB 2020  No evidence of adynamic ileus, essentially normal bowel   gas pattern.      CXR 2020  1. Minimal atelectasis in the lung bases.    2. NG tube in good position.     CXR 2020  Satisfactory position of the nasogastric tube.      KUB 11/03/2020  Findings suggest developing adynamic ileus.     Xr Knee Right (1-2 Views)  1. Status post previous right total knee arthroplasty. I do not see any evidence of complication. 2. Small joint effusion. Signed by Dr Surinder Rowley on 11/1/2020 12:11 PM     Ct Head Wo Contrast  1. Mild cerebral and cerebellar volume loss with chronic microvascular disease but no evidence of acute intracranial process. 2. Mucous retention cysts or polyps within both maxillary sinuses. Signed by Dr Surinder Rowley on 11/1/2020 12:32 PM     Ct Cervical Spine Wo Contrast  1. Status post fusion at the C5-C7 level with corpectomy at C6. No evidence of complication. 2. No evidence of fracture or listhesis. The disc space heights are otherwise well-maintained. No evidence of central or neural foraminal encroachment. . Signed by Dr Surinder Rowley on 11/1/2020 12:36 PM     Ct Thoracic Spine Wo Contrast  No evidence of acute osseous injury in the thoracic spine. Signed by Dr Surinder Rowley on 11/1/2020 12:40 PM     Xr Chest Portable  Bibasilar atelectasis. Lungs are otherwise clear.  Signed by Dr Surinder Rowley on 11/1/2020 11:43 AM     Micro: Urine culture collected 11/01/2020 Klebsiella pneumoniae   Klebsiella pneumoniae (1)   Antibiotic         Interpretation   PEDRO      Status    ampicillin         Resistant         >=32    mcg/mL                         aztreonam       Sensitive         <=1      mcg/mL                         ceFAZolin        Sensitive         <=4      mcg/mL                         cefepime         Sensitive         <=1      mcg/mL                         cefTRIAXone   Sensitive         <=1      mcg/mL                         ertapenem       Sensitive         <=0.5   mcg/mL                         gentamicin       Sensitive         <=1      mcg/mL                         levofloxacin     Sensitive         <=0.12             mcg/mL                         meropenem     Sensitive         <=0.25 mcg/mL                         nitrofurantoin   Resistant         128      mcg/mL                         piperacillin-tazobactam           Sensitive         <=4      mcg/mL                         trimethoprim-sulfamethoxazole          Sensitive         <=20    mcg/mL                             Klebsiella pneumoniae (2)   Antibiotic         Interpretation   PEDRO      Status    ampicillin         Resistant         >=32    mcg/mL                         aztreonam       Sensitive         <=1      mcg/mL                         ceFAZolin        Sensitive         <=4      mcg/mL                         cefepime         Sensitive         <=1      mcg/mL                         cefTRIAXone   Sensitive         <=1      mcg/mL                         ertapenem       Sensitive         <=0.5   mcg/mL                         gentamicin       Sensitive         <=1      mcg/mL                         levofloxacin     Sensitive         <=0.12             mcg/mL                         meropenem     Sensitive         <=0.25             mcg/mL                         nitrofurantoin   Resistant         128      mcg/mL                         piperacillin-tazobactam           Sensitive         <=4      mcg/mL                         trimethoprim-sulfamethoxazole          Sensitive         <=20    mcg/mL       Significant Diagnostic Studies:     Pertinent Labs:   CBC:   Recent Labs     11/06/20 0437 11/07/20  0350 11/08/20  0144   WBC 14.6* 13.2* 13.7*   HGB 10.5* 10.3* 10.3*    288 282     BMP:    Recent Labs     11/06/20 0437 11/06/20  1927 11/07/20  0350 11/08/20  0144   *  --  137 138   K 3.4* 4.0 3.8 4.1   CL 95*  --  100 100   CO2 24  --  21* 21*   BUN 16  --  19 21   CREATININE 0.8  --  0.7 0.8   GLUCOSE 157*  --  130* 104     Physical Exam:  Vital Signs: /75   Pulse 86   Temp 96.5 °F (35.8 °C) (Temporal)   Resp 18   Ht 5' 2\" (1.575 m)   Wt 155 lb 1.6 oz (70.4 kg)   LMP  (LMP Unknown)   SpO2 94%   Breastfeeding No   BMI 28.37 kg/m²   General appearance:. Alert and Cooperative   HEENT: Normocephalic. Chest: clear to auscultation bilaterally without wheezes or rhonchi. Cardiac: Normal heart tones with regular rate and rhythm, S1, S2 normal. No murmurs, gallops, or rubs auscultated. Abdomen:soft, non-tender; non-distended normal bowel sounds no masses, no organomegaly. Extremities: No clubbing or cyanosis. No peripheral edema. Peripheral pulses palpable. Neurologic: Grossly intact. Discharge Medications:       [facility]   Home Medication Instructions [facility]:[id number]    Printed on:11/08/20 1257   Medication Information                      ALPRAZolam (XANAX PO)  Take 1 mg by mouth 4 times daily as needed              carvedilol (COREG) 12.5 MG tablet  Take 1 tablet by mouth 2 times daily (with meals)             cyclobenzaprine (FLEXERIL) 10 MG tablet  cyclobenzaprine 10 mg tablet             diphenhydrAMINE-APAP, sleep, (TYLENOL PM EXTRA STRENGTH PO)  Take by mouth as needed             hydrALAZINE (APRESOLINE) 25 MG tablet  Take 1 tablet by mouth every 8 hours             HYDROcodone-acetaminophen (NORCO) 7.5-325 MG per tablet  Take 1 tablet by mouth every 6 hours as needed for Pain. .             isosorbide mononitrate (IMDUR) 30 MG extended release tablet  Take 1 tablet by mouth daily               Discharge Instructions: Follow up with Franci Loaiza MD in 3-5 days. Take medications as directed. Resume activity as tolerated. Diet: DIET GENERAL; Dental Soft     Disposition: Patient is medically stable and will be discharged home with home health. Time spent on discharge 45 minutes spent in assessing patient, reviewing medications, discussion with nursing, confirming safe discharge plan and preparation of discharge summary.     Signed:  Heidi Palacio PA-C

## 2020-11-08 NOTE — CARE COORDINATION
Pts nurse called SW. Nurse stated pt is nervous about going home and wants to go to SNF before returning. Pt wants referral sent to [facility]. Referral was sent.     [address] [place] [address] [phone]  Electronically signed by Sarah Beth Boss on 11/8/2020 at 2:02 PM

## 2020-11-09 VITALS
TEMPERATURE: 97.7 F | BODY MASS INDEX: 28.54 KG/M2 | RESPIRATION RATE: 18 BRPM | WEIGHT: 155.1 LBS | DIASTOLIC BLOOD PRESSURE: 61 MMHG | OXYGEN SATURATION: 95 % | HEART RATE: 83 BPM | SYSTOLIC BLOOD PRESSURE: 123 MMHG | HEIGHT: 62 IN

## 2020-11-09 LAB
ANION GAP SERPL CALCULATED.3IONS-SCNC: 14 MMOL/L (ref 7–19)
BUN BLDV-MCNC: 23 MG/DL (ref 8–23)
CALCIUM SERPL-MCNC: 8.3 MG/DL (ref 8.8–10.2)
CHLORIDE BLD-SCNC: 102 MMOL/L (ref 98–111)
CO2: 22 MMOL/L (ref 22–29)
CREAT SERPL-MCNC: 0.9 MG/DL (ref 0.5–0.9)
GFR AFRICAN AMERICAN: >59
GFR NON-AFRICAN AMERICAN: >60
GLUCOSE BLD-MCNC: 97 MG/DL (ref 74–109)
HCT VFR BLD CALC: 31.1 % (ref 37–47)
HEMOGLOBIN: 9.8 G/DL (ref 12–16)
MAGNESIUM: 1.5 MG/DL (ref 1.6–2.4)
MCH RBC QN AUTO: 27.8 PG (ref 27–31)
MCHC RBC AUTO-ENTMCNC: 31.5 G/DL (ref 33–37)
MCV RBC AUTO: 88.4 FL (ref 81–99)
PDW BLD-RTO: 14.6 % (ref 11.5–14.5)
PLATELET # BLD: 258 K/UL (ref 130–400)
PMV BLD AUTO: 10.8 FL (ref 9.4–12.3)
POTASSIUM REFLEX MAGNESIUM: 3.5 MMOL/L (ref 3.5–5)
RBC # BLD: 3.52 M/UL (ref 4.2–5.4)
SARS-COV-2, PCR: NOT DETECTED
SODIUM BLD-SCNC: 138 MMOL/L (ref 136–145)
WBC # BLD: 11.8 K/UL (ref 4.8–10.8)

## 2020-11-09 PROCEDURE — 83735 ASSAY OF MAGNESIUM: CPT

## 2020-11-09 PROCEDURE — 6360000002 HC RX W HCPCS: Performed by: HOSPITALIST

## 2020-11-09 PROCEDURE — 97535 SELF CARE MNGMENT TRAINING: CPT

## 2020-11-09 PROCEDURE — 6360000002 HC RX W HCPCS: Performed by: PHYSICIAN ASSISTANT

## 2020-11-09 PROCEDURE — U0003 INFECTIOUS AGENT DETECTION BY NUCLEIC ACID (DNA OR RNA); SEVERE ACUTE RESPIRATORY SYNDROME CORONAVIRUS 2 (SARS-COV-2) (CORONAVIRUS DISEASE [COVID-19]), AMPLIFIED PROBE TECHNIQUE, MAKING USE OF HIGH THROUGHPUT TECHNOLOGIES AS DESCRIBED BY CMS-2020-01-R: HCPCS

## 2020-11-09 PROCEDURE — 80048 BASIC METABOLIC PNL TOTAL CA: CPT

## 2020-11-09 PROCEDURE — 97165 OT EVAL LOW COMPLEX 30 MIN: CPT

## 2020-11-09 PROCEDURE — 6370000000 HC RX 637 (ALT 250 FOR IP): Performed by: HOSPITALIST

## 2020-11-09 PROCEDURE — 36415 COLL VENOUS BLD VENIPUNCTURE: CPT

## 2020-11-09 PROCEDURE — 6370000000 HC RX 637 (ALT 250 FOR IP): Performed by: PHYSICIAN ASSISTANT

## 2020-11-09 PROCEDURE — 85027 COMPLETE CBC AUTOMATED: CPT

## 2020-11-09 RX ORDER — HYDRALAZINE HYDROCHLORIDE 25 MG/1
25 TABLET, FILM COATED ORAL EVERY 8 HOURS SCHEDULED
Status: DISCONTINUED | OUTPATIENT
Start: 2020-11-09 | End: 2020-11-09 | Stop reason: HOSPADM

## 2020-11-09 RX ORDER — ALPRAZOLAM 1 MG/1
1 TABLET ORAL 4 TIMES DAILY PRN
Qty: 12 TABLET | Refills: 0 | Status: SHIPPED | OUTPATIENT
Start: 2020-11-09 | End: 2020-11-12

## 2020-11-09 RX ORDER — MAGNESIUM SULFATE IN WATER 40 MG/ML
4 INJECTION, SOLUTION INTRAVENOUS ONCE
Status: COMPLETED | OUTPATIENT
Start: 2020-11-09 | End: 2020-11-09

## 2020-11-09 RX ORDER — HYDROCODONE BITARTRATE AND ACETAMINOPHEN 7.5; 325 MG/1; MG/1
1 TABLET ORAL EVERY 6 HOURS PRN
Qty: 12 TABLET | Refills: 0 | Status: SHIPPED | OUTPATIENT
Start: 2020-11-09 | End: 2020-11-12

## 2020-11-09 RX ADMIN — DOCUSATE SODIUM 100 MG: 100 CAPSULE, LIQUID FILLED ORAL at 09:11

## 2020-11-09 RX ADMIN — ENOXAPARIN SODIUM 40 MG: 40 INJECTION SUBCUTANEOUS at 14:13

## 2020-11-09 RX ADMIN — HYDRALAZINE HYDROCHLORIDE 25 MG: 25 TABLET, FILM COATED ORAL at 14:13

## 2020-11-09 RX ADMIN — CARVEDILOL 12.5 MG: 12.5 TABLET, FILM COATED ORAL at 17:23

## 2020-11-09 RX ADMIN — ISOSORBIDE MONONITRATE 30 MG: 30 TABLET, EXTENDED RELEASE ORAL at 09:11

## 2020-11-09 RX ADMIN — HYDRALAZINE HYDROCHLORIDE 25 MG: 25 TABLET, FILM COATED ORAL at 09:15

## 2020-11-09 RX ADMIN — MAGNESIUM SULFATE HEPTAHYDRATE 4 G: 40 INJECTION, SOLUTION INTRAVENOUS at 10:56

## 2020-11-09 RX ADMIN — POLYETHYLENE GLYCOL 3350 17 G: 17 POWDER, FOR SOLUTION ORAL at 09:11

## 2020-11-09 RX ADMIN — CARVEDILOL 12.5 MG: 12.5 TABLET, FILM COATED ORAL at 09:11

## 2020-11-09 ASSESSMENT — PAIN DESCRIPTION - PROGRESSION: CLINICAL_PROGRESSION: GRADUALLY IMPROVING

## 2020-11-09 ASSESSMENT — PAIN DESCRIPTION - LOCATION: LOCATION: KNEE

## 2020-11-09 ASSESSMENT — PAIN DESCRIPTION - PAIN TYPE: TYPE: CHRONIC PAIN

## 2020-11-09 ASSESSMENT — PAIN DESCRIPTION - DESCRIPTORS: DESCRIPTORS: ACHING

## 2020-11-09 ASSESSMENT — PAIN DESCRIPTION - ONSET: ONSET: ON-GOING

## 2020-11-09 ASSESSMENT — PAIN SCALES - GENERAL
PAINLEVEL_OUTOF10: 0
PAINLEVEL_OUTOF10: 3

## 2020-11-09 ASSESSMENT — PAIN DESCRIPTION - FREQUENCY: FREQUENCY: CONTINUOUS

## 2020-11-09 ASSESSMENT — PAIN DESCRIPTION - ORIENTATION: ORIENTATION: RIGHT

## 2020-11-09 NOTE — CARE COORDINATION
11/9/20: [facility]; pending covid   [facility]   I  [phone] F  Electronically signed by YANELIS Rao on 11/9/2020 at 11:25 AM

## 2020-11-09 NOTE — PROGRESS NOTES
Home: House  Home Layout: One level  Home Access: Stairs to enter with rails  Entrance Stairs - Number of Steps: 2  Home Equipment: (reports no equipment at home)  Receives Help From: Family  ADL Assistance: Independent  Ambulation Assistance: Independent  Transfer Assistance: Independent  Additional Comments: multiple falls at home per chart and per pt. Pt. became tearful thinking about her home life and how her son committed suicide in the past and her little 8 y.o. grandson who lives in a SNF in [place]. Objective        Orientation  Overall Orientation Status: Within Normal Limits     Balance  Sitting Balance: Contact guard assistance  Standing Balance: Minimal assistance  Toilet Transfers  Toilet Transfer: Minimal assistance  Shower Transfers  Shower Transfers: Minimal assistance  ADL  Feeding: Independent  Grooming: Supervision;Setup  UE Bathing: Minimal assistance  LE Bathing: Minimal assistance  UE Dressing: Supervision;Setup  LE Dressing:  Moderate assistance(poor access to feet for seated aspects)  Toileting: Minimal assistance  Additional Comments: ADAPTIVE EQUIPMENT NEEDS:  reacher, sock aid, dressing stick  Quality of Movement Other  Comment: No FM deficits observed     Bed mobility  Rolling to Left: Stand by assistance  Rolling to Right: Stand by assistance  Supine to Sit: Contact guard assistance  Comment: REQUIRES CUES FOR TECHNIQUES FOR BED MOBILITY  Transfers  Stand Step Transfers: Minimal assistance     Cognition  Cognition Comment: Awake, alert, able to follow directions, needs supervision and structure                 LUE PROM (degrees)  LUE PROM: WNL  LUE AROM (degrees)  LUE AROM : WNL  RUE PROM (degrees)  RUE PROM: WNL  RUE AROM (degrees)  RUE AROM : WNL  LUE Strength  LUE Strength Comment: Grossly 4/5  RUE Strength  RUE Strength Comment: Grossly 4/5                 Tx initiated:  Balance activities, movement strategies (15 mins)    Plan   Plan  Times per week: 3-5    G-Code OutComes Score                                                  AM-PAC Score             Goals  Short term goals  Short term goal 1: Modified independent with dressing  Short term goal 2: Modified independent for toileting  Short term goal 3: Modified independent for transfers  Short term goal 4: Modified independent with light ambulatory ADL       Therapy Time   Individual Concurrent Group Co-treatment   Time In           Time Out           Minutes                   Stephania Goldmann, OT Electronically signed by Stephania Goldmann, OT on 11/9/2020 at 11:23 AM

## 2020-11-09 NOTE — PROGRESS NOTES
Orientation: Right  Pain Descriptors: Aching  Pain Frequency: Continuous  Pain Onset: On-going  Clinical Progression: Gradually improving  Non-Pharmaceutical Pain Intervention(s): Rest;Repositioned  Response to Pain Intervention: Patient Satisfied  Vital Signs  Patient Currently in Pain: Yes     Objective    ADL  LE Dressing: Minimal assistance(Utilized AE, demonstrated good understanding of tech.  Min A for balance during standing portion.)     Balance  Sitting Balance: Contact guard assistance  Standing Balance: Minimal assistance     Transfers  Sit to stand: Minimal assistance  Stand to sit: Contact guard assistance     Cognition  Cognition Comment: Awake, alert, able to follow directions, needs supervision and structure      LUE PROM (degrees)  LUE PROM: WNL  LUE AROM (degrees)  LUE AROM : WNL  RUE PROM (degrees)  RUE PROM: WNL  RUE AROM (degrees)  RUE AROM : WNL      Plan   Plan  Times per week: 3-5       Goals  Short term goals  Short term goal 1: Modified independent with dressing  Short term goal 2: Modified independent for toileting  Short term goal 3: Modified independent for transfers  Short term goal 4: Modified independent with light ambulatory ADL       Therapy Time   Individual Concurrent Group Co-treatment   Time In           Time Out           Minutes  25         Timed Code Treatment Minutes: 4308 ALLIE Duff

## 2020-11-09 NOTE — DISCHARGE SUMMARY
Sharlene Escobedo  :  1950  MRN:  [id number]    Admit date:  2020  Discharge date:  2020    Discharging Physician:  Dr. Deejay Small Directive: Full Code    Consults: none    Primary Care Physician:  Devorah Light MD    Discharge Diagnoses:  Principal Problem:    REHANA (acute kidney injury) ([address]  Active Problems:    Primary osteoarthritis of right knee    Acute cystitis without hematuria    Frequent falls    Hypertension  Resolved Problems:    * No resolved hospital problems. *  Portions of this note have been copied forward, however, changed to reflect the most current clinical status of this patient. Hospital Course:    Hanna Almanza is a 69year old female with PMH HTN, HLD, and back pain who presented to [address] [place] [address] ED complaining of weakness, dizziness and frequent falls.  gave history of confusion. She was admitted to Hospitalist service for REHANA and UTI and was continued on Rocephin and IVF's. Zestoretic and sedating medications discontinued. Hydralazine, Imdur, Coreg added to medication regimen for blood pressure control. She then developed severe nausea and vomiting. KUB concerning for Ileus. NGT placed. IV Reglan and dulcolax suppository continued. Patient self-removed NGT on 2020. KUB 2020 revealed resolution of Ileus and she was started on a clear liquid diet. Diet further advanced on 2020. Xanax resumed. She has been recommended to continue over the counter colace twice daily and miralax daily or as needed to prevent constipation. At this time home health has been set up. She has been ordered a walker and bedside commode. Patient has decided on SNF instead of home health. [address] [place] [address] has accepted pt pending negative COVID. She is stable for discharge per attending Hospitalist.       Significant Diagnostic Studies:   Xr Knee Right (1-2 Views)  Result Date: 2020  1. Status post previous right total knee arthroplasty. I do not see any evidence of complication.    2. Small joint effusion. Signed by Dr Heike Valderrama on 11/1/2020 12:11 PM    Ct Head Wo Contrast  Result Date: 11/1/2020  1. Mild cerebral and cerebellar volume loss with chronic microvascular disease but no evidence of acute intracranial process. 2. Mucous retention cysts or polyps within both maxillary sinuses. Signed by Dr Heike Valderrama on 11/1/2020 12:32 PM    Ct Cervical Spine Wo Contrast  Result Date: 11/1/2020  1. Status post fusion at the C5-C7 level with corpectomy at C6. No evidence of complication. 2. No evidence of fracture or listhesis. The disc space heights are otherwise well-maintained. No evidence of central or neural foraminal encroachment. Signed by Dr Heike Valderrama on 11/1/2020 12:36 PM    Ct Thoracic Spine Wo Contrast  Result Date: 11/1/2020  No evidence of acute osseous injury in the thoracic spine. Signed by Dr Heike Valderrama on 11/1/2020 12:40 PM    Xr Chest Portable  Result Date: 11/1/2020  Bibasilar atelectasis. Lungs are otherwise clear. Signed by Dr Heike Valderrama on 11/1/2020 11:43 AM      Pertinent Labs:   CBC:   Recent Labs     11/07/20  0350 11/08/20  0144 11/09/20  0142   WBC 13.2* 13.7* 11.8*   HGB 10.3* 10.3* 9.8*    282 258     BMP:    Recent Labs     11/07/20  0350 11/08/20 0144 11/09/20  0142    138 138   K 3.8 4.1 3.5    100 102   CO2 21* 21* 22   BUN 19 21 23   CREATININE 0.7 0.8 0.9   GLUCOSE 130* 104 97       Physical Exam:  Vital Signs: BP (!) 182/78   Pulse 86   Temp 96.7 °F (35.9 °C) (Temporal)   Resp 16   Ht 5' 2\" (1.575 m)   Wt 155 lb 1.6 oz (70.4 kg)   LMP  (LMP Unknown)   SpO2 93%   Breastfeeding No   BMI 28.37 kg/m²   General appearance:. Alert and Cooperative   HEENT: Normocephalic. Chest: clear to auscultation bilaterally without wheezes or rhonchi. Cardiac: Normal heart tones with regular rate and rhythm, S1, S2 normal. No murmurs, gallops, or rubs auscultated.    Abdomen:soft, non-tender; non-distended normal bowel sounds no masses, no organomegaly. Extremities: No clubbing or cyanosis. No peripheral edema. Peripheral pulses palpable. Neurologic: Grossly intact. Discharge Medications:       Mary Robert   Home Medication Instructions [facility]:[id number]    Printed on:11/09/20 2811   Medication Information                      ALPRAZolam (XANAX PO)  Take 1 mg by mouth 4 times daily as needed              carvedilol (COREG) 12.5 MG tablet  Take 1 tablet by mouth 2 times daily (with meals)             cyclobenzaprine (FLEXERIL) 10 MG tablet  cyclobenzaprine 10 mg tablet             diphenhydrAMINE-APAP, sleep, (TYLENOL PM EXTRA STRENGTH PO)  Take by mouth as needed             hydrALAZINE (APRESOLINE) 25 MG tablet  Take 1 tablet by mouth every 8 hours             HYDROcodone-acetaminophen (NORCO) 7.5-325 MG per tablet  Take 1 tablet by mouth every 6 hours as needed for Pain. .             isosorbide mononitrate (IMDUR) 30 MG extended release tablet  Take 1 tablet by mouth daily                 Discharge Instructions: Follow up with accepting provider upon arrival.  Take medications as directed. Resume activity as tolerated. Diet: DIET GENERAL; Dental Soft     Disposition: Patient is medically stable and will be discharged to [facility]. Time spent on discharge 45 minutes spent in assessing patient, reviewing medications, discussion with nursing, confirming safe discharge plan and preparation of discharge summary.     Signed:  Chay Pina PA-C

## 2021-03-10 ENCOUNTER — HOSPITAL ENCOUNTER (OUTPATIENT)
Dept: GENERAL RADIOLOGY | Facility: HOSPITAL | Age: 71
Discharge: HOME | End: 2021-03-10
Admitting: NURSE PRACTITIONER

## 2021-03-10 ENCOUNTER — OFFICE VISIT (OUTPATIENT)
Dept: FAMILY MEDICINE CLINIC | Facility: CLINIC | Age: 71
End: 2021-03-10

## 2021-03-10 VITALS
BODY MASS INDEX: 31.54 KG/M2 | TEMPERATURE: 97.5 F | HEART RATE: 97 BPM | HEIGHT: 62 IN | DIASTOLIC BLOOD PRESSURE: 66 MMHG | OXYGEN SATURATION: 92 % | SYSTOLIC BLOOD PRESSURE: 120 MMHG | RESPIRATION RATE: 16 BRPM | WEIGHT: 171.4 LBS

## 2021-03-10 DIAGNOSIS — M71.022 OLECRANON BURSA ABSCESS, LEFT: ICD-10-CM

## 2021-03-10 DIAGNOSIS — M25.522 PAIN IN LEFT ELBOW: Primary | ICD-10-CM

## 2021-03-10 DIAGNOSIS — M25.522 PAIN IN LEFT ELBOW: ICD-10-CM

## 2021-03-10 PROCEDURE — 99203 OFFICE O/P NEW LOW 30 MIN: CPT | Performed by: NURSE PRACTITIONER

## 2021-03-10 PROCEDURE — 73080 X-RAY EXAM OF ELBOW: CPT

## 2021-03-10 RX ORDER — MELOXICAM 7.5 MG/1
7.5 TABLET ORAL 2 TIMES DAILY PRN
COMMUNITY
Start: 2021-01-11

## 2021-03-10 NOTE — PROGRESS NOTES
Chief Complaint  Elbow Pain (She states she fell on concrete two weeks ago. )    Subjective    History of Present Illness      Patient presents to [facility] for   Patient states that she fell 2 weeks ago on concrete.  She states that her left elbow has been hurting ever since and has developed swelling and tenderness around the elbow.  She is concerned that her elbow is broken.    Elbow Pain  This is a new problem. The current episode started 1 to 4 weeks ago. The problem occurs constantly. The problem has been gradually worsening. Associated symptoms include arthralgias. Pertinent negatives include no abdominal pain, chest pain, coughing, diaphoresis, fatigue, fever, myalgias, numbness, rash, sore throat, vomiting or weakness.        Review of Systems   Constitutional: Negative for appetite change, diaphoresis, fatigue and fever.   HENT: Negative for ear pain, hearing loss, mouth sores, sinus pressure, sneezing, sore throat and voice change.    Eyes: Negative for discharge, itching and visual disturbance.   Respiratory: Negative for cough, shortness of breath and wheezing.    Cardiovascular: Negative for chest pain and palpitations.   Gastrointestinal: Negative for abdominal pain, diarrhea and vomiting.   Musculoskeletal: Positive for arthralgias. Negative for back pain and myalgias.        Left elbow pain   Skin: Negative for rash and bruise.   Neurological: Negative for dizziness, seizures, weakness, numbness and headache.   Hematological: Negative for adenopathy. Does not bruise/bleed easily.   Psychiatric/Behavioral: Negative for agitation, dysphoric mood, sleep disturbance and depressed mood. The patient is not nervous/anxious.        I have reviewed and agree with the HPI and ROS information as above.  Joanna Granados, APRN     Objective   Vital Signs:   /66 (BP Location: Right arm, Patient Position: Sitting)   Pulse 97   Temp 97.5 °F (36.4 °C)   Resp 16   Ht  "157.5 cm (62\")   Wt 77.7 kg (171 lb 6.4 oz)   SpO2 92%   BMI 31.35 kg/m²       Physical Exam  Vitals and nursing note reviewed.   Constitutional:       Appearance: Normal appearance. She is well-developed.   HENT:      Head: Normocephalic and atraumatic.      Right Ear: Tympanic membrane, ear canal and external ear normal.      Left Ear: Tympanic membrane, ear canal and external ear normal.      Nose: Nose normal. No septal deviation, nasal tenderness or congestion.      Mouth/Throat:      Lips: Pink. No lesions.      Mouth: Mucous membranes are moist. No oral lesions.      Dentition: Normal dentition.      Pharynx: Oropharynx is clear. No pharyngeal swelling, oropharyngeal exudate or posterior oropharyngeal erythema.   Eyes:      General: Lids are normal. Vision grossly intact. No scleral icterus.        Right eye: No discharge.         Left eye: No discharge.      Extraocular Movements: Extraocular movements intact.      Conjunctiva/sclera: Conjunctivae normal.      Right eye: Right conjunctiva is not injected.      Left eye: Left conjunctiva is not injected.      Pupils: Pupils are equal, round, and reactive to light.   Neck:      Thyroid: No thyroid mass.      Trachea: Trachea normal.   Cardiovascular:      Rate and Rhythm: Normal rate and regular rhythm.      Heart sounds: Normal heart sounds. No murmur. No gallop.    Pulmonary:      Effort: Pulmonary effort is normal.      Breath sounds: Normal breath sounds and air entry. No wheezing, rhonchi or rales.   Musculoskeletal:         General: Tenderness and deformity present.      Left elbow: Swelling and deformity present.      Cervical back: Full passive range of motion without pain, normal range of motion and neck supple.      Thoracic back: Normal.      Right lower leg: No edema.      Left lower leg: No edema.      Comments: Olecranon bursitis    Skin:     General: Skin is warm and dry.      Coloration: Skin is not jaundiced.      Findings: No rash. "   Neurological:      Mental Status: She is alert and oriented to person, place, and time.      Cranial Nerves: Cranial nerves are intact.      Sensory: Sensation is intact.      Motor: Motor function is intact.      Coordination: Coordination is intact.      Gait: Gait is intact.      Deep Tendon Reflexes: Reflexes are normal and symmetric.   Psychiatric:         Mood and Affect: Mood and affect normal.         Behavior: Behavior normal.         Judgment: Judgment normal.                     Assessment and Plan        Problem List Items Addressed This Visit     None      Visit Diagnoses     Pain in left elbow    -  Primary    Relevant Orders    XR Elbow 3+ View Left (Completed)    Olecranon bursa abscess, left            States she fell and tripped 2 weeks ago, and landed on her left elbow.  She states that she try to go to her PCP but he cannot see her until April 8.  She states that her left elbow was hurting, she cannot move the elbow, and there is swelling all along the elbow.  On exam the elbow is obviously very tender and there is an obvious olecranon bursitis.  We will get an x-ray at this time to make sure that there is no fracture and will drain the bursitis once the patient is back from imaging.    Patient did not return to the office as advised.  I did call the number on the chart and spoke with her  and he states that she must have misunderstood.  He states that he would bring her back to the office to discuss the results.  But the patient has never showed back up and it is currently closing time.      Follow Up   Return if symptoms worsen or fail to improve.  Patient was given instructions and counseling regarding her condition or for health maintenance advice. Please see specific information pulled into the AVS if appropriate.

## 2021-03-19 ENCOUNTER — OFFICE VISIT (OUTPATIENT)
Dept: FAMILY MEDICINE CLINIC | Facility: CLINIC | Age: 71
End: 2021-03-19

## 2021-03-19 VITALS
BODY MASS INDEX: 31.28 KG/M2 | SYSTOLIC BLOOD PRESSURE: 132 MMHG | HEIGHT: 62 IN | HEART RATE: 94 BPM | DIASTOLIC BLOOD PRESSURE: 68 MMHG | TEMPERATURE: 97.5 F | WEIGHT: 170 LBS

## 2021-03-19 DIAGNOSIS — M25.422 EFFUSION OF LEFT OLECRANON BURSA: ICD-10-CM

## 2021-03-19 DIAGNOSIS — M25.522 ELBOW PAIN, LEFT: Primary | ICD-10-CM

## 2021-03-19 PROCEDURE — 99213 OFFICE O/P EST LOW 20 MIN: CPT | Performed by: NURSE PRACTITIONER

## 2021-03-19 RX ORDER — ALPRAZOLAM 1 MG/1
1 TABLET ORAL 4 TIMES DAILY
COMMUNITY

## 2021-03-19 RX ORDER — CARVEDILOL 12.5 MG/1
12.5 TABLET ORAL 2 TIMES DAILY
COMMUNITY
Start: 2020-12-30

## 2021-03-19 NOTE — PROGRESS NOTES
"Chief Complaint  Elbow Injury    Subjective    History of Present Illness      Patient presents to [facility] for   Pt states that she is here to go over her xrays of her L elbow. Pt states that she fell last month and landed on L arm. Pt says says she has a large nodule that popped up. to  and it;s     Elbow Injury  This is a new problem. The current episode started more than 1 month ago. Associated symptoms include joint swelling. Pertinent negatives include no abdominal pain, arthralgias, chest pain, coughing, diaphoresis, fatigue, fever, myalgias, numbness, rash, sore throat, vomiting or weakness.        Review of Systems   Constitutional: Negative for appetite change, diaphoresis, fatigue and fever.   HENT: Negative for ear pain, hearing loss, mouth sores, sinus pressure, sneezing, sore throat and voice change.    Eyes: Negative for discharge, itching and visual disturbance.   Respiratory: Negative for cough, shortness of breath and wheezing.    Cardiovascular: Negative for chest pain and palpitations.   Gastrointestinal: Negative for abdominal pain, diarrhea and vomiting.   Musculoskeletal: Positive for joint swelling and bursitis. Negative for arthralgias, back pain and myalgias.        Left elbow pain   Skin: Negative for rash and bruise.   Neurological: Negative for dizziness, seizures, weakness, numbness and headache.   Hematological: Negative for adenopathy. Does not bruise/bleed easily.   Psychiatric/Behavioral: Negative for agitation, dysphoric mood, sleep disturbance and depressed mood. The patient is not nervous/anxious.        I have reviewed and agree with the HPI and ROS information as above.  Joanna Granados, APRN     Objective   Vital Signs:   /68   Pulse 94   Temp 97.5 °F (36.4 °C)   Ht 157.5 cm (62\")   Wt 77.1 kg (170 lb)   BMI 31.09 kg/m²       Physical Exam  Vitals and nursing note reviewed.   Constitutional:       Appearance: Normal appearance. " She is well-developed.   HENT:      Head: Normocephalic and atraumatic.      Right Ear: Tympanic membrane, ear canal and external ear normal.      Left Ear: Tympanic membrane, ear canal and external ear normal.      Nose: Nose normal. No septal deviation, nasal tenderness or congestion.      Mouth/Throat:      Lips: Pink. No lesions.      Mouth: Mucous membranes are moist. No oral lesions.      Dentition: Normal dentition.      Pharynx: Oropharynx is clear. No pharyngeal swelling, oropharyngeal exudate or posterior oropharyngeal erythema.   Eyes:      General: Lids are normal. Vision grossly intact. No scleral icterus.        Right eye: No discharge.         Left eye: No discharge.      Extraocular Movements: Extraocular movements intact.      Conjunctiva/sclera: Conjunctivae normal.      Right eye: Right conjunctiva is not injected.      Left eye: Left conjunctiva is not injected.      Pupils: Pupils are equal, round, and reactive to light.   Neck:      Thyroid: No thyroid mass.      Trachea: Trachea normal.   Cardiovascular:      Rate and Rhythm: Normal rate and regular rhythm.      Heart sounds: Normal heart sounds. No murmur heard.   No gallop.    Pulmonary:      Effort: Pulmonary effort is normal.      Breath sounds: Normal breath sounds and air entry. No wheezing, rhonchi or rales.   Musculoskeletal:         General: No deformity.      Left elbow: Swelling and effusion present. Decreased range of motion. Tenderness present.      Cervical back: Full passive range of motion without pain, normal range of motion and neck supple.      Thoracic back: Normal.      Right lower leg: No edema.      Left lower leg: No edema.   Skin:     General: Skin is warm and dry.      Coloration: Skin is not jaundiced.      Findings: No rash.   Neurological:      Mental Status: She is alert and oriented to person, place, and time.      Cranial Nerves: Cranial nerves are intact.      Sensory: Sensation is intact.      Motor: Motor  function is intact.      Coordination: Coordination is intact.      Gait: Gait is intact.      Deep Tendon Reflexes: Reflexes are normal and symmetric.   Psychiatric:         Mood and Affect: Mood and affect normal.         Behavior: Behavior normal.         Judgment: Judgment normal.          Result Review  Data Reviewed:   The following data was reviewed by: NORBERT Roach on 03/19/2021:    Data reviewed: Radiologic studies xray of the left elbow   XR Elbow 3+ View Left (03/10/2021 14:18)    Incision & Drainage    Date/Time: 3/19/2021 4:06 PM  Performed by: Joanna Granados APRN  Authorized by: Joanna Granados APRN   Indications for incision and drainage: bursa sac.  Body area: upper extremity  Location details: left elbow    Sedation:  Patient sedated: no    Needle gauge: 18  Drainage: bloody  Drainage amount: moderate (8 ml)  Wound treatment: bandaid placed to left elbow.  Packing material: none  Patient tolerance: patient tolerated the procedure well with no immediate complications            Assessment and Plan    .    Problem List Items Addressed This Visit     None      Visit Diagnoses     Elbow pain, left    -  Primary    Effusion of left olecranon bursa        Relevant Orders    Incision & Drainage (Completed)      Patient was seen on March 10 for left elbow pain after a fall.  An x-ray was completed at that time but the patient did not return for results.  Patient has returned today to discuss those results.  X-ray of the elbow was negative for any fracture.  There is a noticeably large olecranon bursa sac that needs to be drained.  Patient has agreed to this at this time.  Patient has requested something for pain.  I agreed to give her something for pain, however I reviewed the Mark and patient recently had 60 tramadol filled on March 8, and then 90 tramadol filled on March 12.  I do not feel comfortable giving this patient any more pain medication at this time.        Follow  Up   Return if symptoms worsen or fail to improve.  Patient was given instructions and counseling regarding her condition or for health maintenance advice. Please see specific information pulled into the AVS if appropriate.

## 2021-03-26 ENCOUNTER — OFFICE VISIT (OUTPATIENT)
Dept: FAMILY MEDICINE CLINIC | Facility: CLINIC | Age: 71
End: 2021-03-26

## 2021-03-26 VITALS — RESPIRATION RATE: 20 BRPM | BODY MASS INDEX: 31.28 KG/M2 | HEIGHT: 62 IN | WEIGHT: 170 LBS | TEMPERATURE: 98 F

## 2021-03-26 DIAGNOSIS — M70.22 OLECRANON BURSITIS, LEFT ELBOW: Primary | ICD-10-CM

## 2021-03-26 PROBLEM — I10 HYPERTENSION: Status: ACTIVE | Noted: 2021-03-26

## 2021-03-26 PROCEDURE — 99213 OFFICE O/P EST LOW 20 MIN: CPT | Performed by: NURSE PRACTITIONER

## 2021-03-26 RX ORDER — LISINOPRIL AND HYDROCHLOROTHIAZIDE 20; 12.5 MG/1; MG/1
1 TABLET ORAL EVERY MORNING
COMMUNITY

## 2021-03-26 RX ORDER — HYDRALAZINE HYDROCHLORIDE 25 MG/1
25 TABLET, FILM COATED ORAL 3 TIMES DAILY
COMMUNITY

## 2021-03-26 RX ORDER — ISOSORBIDE MONONITRATE 30 MG/1
30 TABLET, EXTENDED RELEASE ORAL EVERY MORNING
COMMUNITY

## 2021-03-26 NOTE — PROGRESS NOTES
"Chief Complaint  Elbow Pain (Left)    Subjective    History of Present Illness      Patient presents to [facility] for   Pt c/o a knot to L elbow x 2 weeks. Pt says the knot is very painful and she has has this problem before.    Elbow Pain  This is a recurrent problem. The current episode started 1 to 4 weeks ago. Associated symptoms include joint swelling.        Review of Systems   Musculoskeletal: Positive for joint swelling.       I have reviewed and agree with the HPI and ROS information as above.  Iqra Marcelo, NORBERT     Objective   Vital Signs:   Temp 98 °F (36.7 °C)   Resp 20   Ht 157.5 cm (62\")   Wt 77.1 kg (170 lb)   BMI 31.09 kg/m²       Physical Exam  Constitutional:       Appearance: Normal appearance. She is well-developed.   HENT:      Head: Normocephalic and atraumatic.      Right Ear: External ear normal.      Left Ear: External ear normal.      Nose: Nose normal. No nasal tenderness or congestion.      Mouth/Throat:      Lips: Pink. No lesions.      Mouth: Mucous membranes are moist. No oral lesions.      Dentition: Normal dentition.      Pharynx: Oropharynx is clear. No pharyngeal swelling, oropharyngeal exudate or posterior oropharyngeal erythema.   Eyes:      General: Lids are normal. Vision grossly intact. No scleral icterus.        Right eye: No discharge.         Left eye: No discharge.      Extraocular Movements: Extraocular movements intact.      Conjunctiva/sclera: Conjunctivae normal.      Right eye: Right conjunctiva is not injected.      Left eye: Left conjunctiva is not injected.      Pupils: Pupils are equal, round, and reactive to light.   Cardiovascular:      Rate and Rhythm: Normal rate and regular rhythm.      Heart sounds: Normal heart sounds. No murmur heard.   No gallop.    Pulmonary:      Effort: Pulmonary effort is normal.      Breath sounds: Normal breath sounds and air entry. No wheezing, rhonchi or rales.   Musculoskeletal:  "        General: Swelling and tenderness present. No deformity. Normal range of motion.      Cervical back: Full passive range of motion without pain, normal range of motion and neck supple.      Right lower leg: No edema.      Left lower leg: No edema.      Comments: Left elbow swelling with fluid sac.    Skin:     General: Skin is warm and dry.      Coloration: Skin is not jaundiced.      Findings: No rash.   Neurological:      Mental Status: She is alert and oriented to person, place, and time.      Cranial Nerves: Cranial nerves are intact.      Sensory: Sensation is intact.      Motor: Motor function is intact.      Coordination: Coordination is intact.      Gait: Gait is intact.   Psychiatric:         Attention and Perception: Attention normal.         Mood and Affect: Mood and affect normal.         Behavior: Behavior is not hyperactive. Behavior is cooperative.         Thought Content: Thought content normal.         Judgment: Judgment normal.          Result Review  Data Reviewed:                   Assessment and Plan      Problem List Items Addressed This Visit     None      Visit Diagnoses     Olecranon bursitis, left elbow    -  Primary    Relevant Orders    Ambulatory Referral to Orthopedic Surgery      Patient comes in today with recurrent issues to her elbow.  A little over a week ago it was drained.  The drainage at that time was noted to be clear where the blood.  No signs of infection in the drainage.  Patient states that she has kept it wrapped up.  Patient's PCP is Dr. Gabriel.  When I went through her medication list and asked her she was still taking the Norco she stated that she was not but she needed to be.  Will proceed with referral to Ortho as patient does wish to go ahead and do this.  Follow-up with Ortho or PCP.         Follow Up   Return if symptoms worsen or fail to improve.  Patient was given instructions and counseling regarding her condition or for health maintenance advice. Please see  specific information pulled into the AVS if appropriate.

## 2021-03-27 ENCOUNTER — HOSPITAL ENCOUNTER (EMERGENCY)
Age: 71
Discharge: HOME | End: 2021-03-27
Attending: EMERGENCY MEDICINE
Payer: MEDICARE

## 2021-03-27 VITALS
BODY MASS INDEX: 27.6 KG/M2 | HEIGHT: 62 IN | RESPIRATION RATE: 20 BRPM | DIASTOLIC BLOOD PRESSURE: 88 MMHG | HEART RATE: 85 BPM | TEMPERATURE: 97.9 F | SYSTOLIC BLOOD PRESSURE: 134 MMHG | WEIGHT: 150 LBS | OXYGEN SATURATION: 94 %

## 2021-03-27 DIAGNOSIS — M70.22 OLECRANON BURSITIS OF LEFT ELBOW: Primary | ICD-10-CM

## 2021-03-27 PROCEDURE — 99283 EMERGENCY DEPT VISIT LOW MDM: CPT

## 2021-03-27 RX ORDER — MELOXICAM 7.5 MG/1
7.5 TABLET ORAL 2 TIMES DAILY PRN
Status: ON HOLD | COMMUNITY
Start: 2021-01-11 | End: 2021-09-17 | Stop reason: HOSPADM

## 2021-03-27 RX ORDER — LISINOPRIL AND HYDROCHLOROTHIAZIDE 12.5; 1 MG/1; MG/1
1 TABLET ORAL DAILY
COMMUNITY

## 2021-03-27 RX ORDER — ALPRAZOLAM 1 MG/1
1 TABLET ORAL 4 TIMES DAILY
COMMUNITY

## 2021-03-27 ASSESSMENT — PAIN SCALES - GENERAL: PAINLEVEL_OUTOF10: 7

## 2021-03-27 NOTE — ED PROVIDER NOTES
[facility]  eMERGENCY dEPARTMENT eNCOUnter      Pt Name: Zoraida Voss  MRN: [id number]  [place] 1950  Date of evaluation: 3/27/2021  Provider: MANDIE Lee    CHIEF COMPLAINT       Chief Complaint   Patient presents with    Elbow Pain     left elbow, injured one week ago; imaging done at another facility; hematoma to elbow         HISTORY OF PRESENT ILLNESS   (Location/Symptom, Timing/Onset,Context/Setting, Quality, Duration, Modifying Factors, Severity)  Note limiting factors. Kat Miranda a 69 y.o. female who presents to the emergency department for evaluation of elbow pain. Pt tells me that she has had left elbow pain over past 17 days. She tells me that she fell injuring her elbow having had x-ray imaging of her left elbow at [facility] that was negative for fracture. She has an olecranon bursitis that she tells me was aspirated with improvement for a few days. She denies increased heat as well as any redness of left elbow joint. She denies new injury to her left elbow. She has had no fevers or constitutional symptoms of illness. She tells me that she has been grieving the death of her grandson who  16 days ago. She denies presentation for suicidal thoughts or mental health concerns. HPI    Nursing Notes were reviewed. REVIEW OF SYSTEMS    (2-9 systems for level 4, 10 or more for level 5)     Review of Systems   Constitutional: Negative for fever. Musculoskeletal: Positive for arthralgias (left elbow). A complete review of systems was performed and is negative except as noted above in the HPI.        PAST MEDICAL HISTORY     Past Medical History:   Diagnosis Date    Anxiety     Arthritis     Back pain of lumbar region with sciatica     Chronic kidney disease     Disease of blood and blood forming organ     Hyperlipidemia     Hypertension     Movement disorder     Other disorders of kidney and ureter in diseases classified elsewhere     Panic attack     PONV (postoperative nausea and vomiting)          SURGICAL HISTORY       Past Surgical History:   Procedure Laterality Date    ABDOMEN SURGERY      APPENDECTOMY      BACK SURGERY      CERVICAL     BACK SURGERY      LUMBAR RUPTURED [address]    CHOLECYSTECTOMY      COLECTOMY      BLOCKAGE    COLONOSCOPY      EYE SURGERY      HYSTERECTOMY      JOINT REPLACEMENT      KNEE ARTHROSCOPY Right     SKIN BIOPSY      TOTAL KNEE ARTHROPLASTY Right 8/26/2016    KNEE TOTAL ARTHROPLASTY performed by Pepper Hodge DO at [address] [facility]       Discharge Medication List as of 3/27/2021  5:05 PM      CONTINUE these medications which have NOT CHANGED    Details   lisinopril-hydroCHLOROthiazide (PRINZIDE;ZESTORETIC) 10-12.5 MG per tablet lisinopril 10 mg-hydrochlorothiazide 12.5 mg tabletHistorical Med      ALPRAZolam (XANAX) 1 MG tablet Take 1 mg by mouth 4 times daily. Historical Med      meloxicam (MOBIC) 7.5 MG tablet Take 7.5 mg by mouth 2 times daily as neededHistorical Med      carvedilol (COREG) 12.5 MG tablet Take 1 tablet by mouth 2 times daily (with meals), Disp-60 tablet,R-0Normal      hydrALAZINE (APRESOLINE) 25 MG tablet Take 1 tablet by mouth every 8 hours, Disp-90 tablet,R-3Normal      diphenhydrAMINE-APAP, sleep, (TYLENOL PM EXTRA STRENGTH PO) Take by mouth as neededHistorical Med             ALLERGIES     Codeine and Naproxen    FAMILY HISTORY       Family History   Problem Relation Age of Onset    Diabetes Mother     Heart Disease Father     High Blood Pressure Father           SOCIAL HISTORY       Social History     Socioeconomic History    Marital status:      Spouse name: None    Number of children: None    Years of education: None    Highest education level: None   Occupational History    None   Social Needs    Financial resource strain: None    Food insecurity     Worry: None     Inability: None    Transportation needs     Medical: None     Non-medical: None Tobacco Use    Smoking status: Current Every Day Smoker     Packs/day: 2.00     Years: 54.00     Pack years: 108.00     Types: Cigarettes    Smokeless tobacco: Never Used   Substance and Sexual Activity    Alcohol use: Not Currently     Comment: occasional    Drug use: No    Sexual activity: None   Lifestyle    Physical activity     Days per week: None     Minutes per session: None    Stress: None   Relationships    Social connections     Talks on phone: None     Gets together: None     Attends Buddhist service: None     Active member of club or organization: None     Attends meetings of clubs or organizations: None     Relationship status: None    Intimate partner violence     Fear of current or ex partner: None     Emotionally abused: None     Physically abused: None     Forced sexual activity: None   Other Topics Concern    None   Social History Narrative    None       SCREENINGS    Troy Coma Scale  Eye Opening: Spontaneous  Best Verbal Response: Oriented  Best Motor Response: Obeys commands  Troy Coma Scale Score: 15        PHYSICAL EXAM    (up to 7 for level 4, 8 or more for level 5)     ED Triage Vitals [03/27/21 1507]   BP Temp Temp src Pulse Resp SpO2 Height Weight   134/88 97.9 °F (36.6 °C) -- 85 20 94 % 5' 2\" (1.575 m) 150 lb (68 kg)       Physical Exam  Vitals signs reviewed. Cardiovascular:      Rate and Rhythm: Normal rate. Pulmonary:      Effort: Pulmonary effort is normal.   Musculoskeletal:      Comments: Normal flexion/extension of left elbow  Soft tissue swelling c/w olecranon bursitis left elbow without secondary sign of infection  CMS intact left upper extremity   Compartments of left arm are soft   Skin:     General: Skin is warm and dry. Neurological:      Mental Status: She is alert and oriented to person, place, and time.          DIAGNOSTIC RESULTS     EKG: All EKG's are interpreted by the Emergency Department Physician who either signs or Co-signs this chart in the absence of acardiologist.        RADIOLOGY:   Non-plain film images such as CT, Ultrasound andMRI are read by the radiologist. Plain radiographic images are visualized and preliminarily interpreted by the emergency physician with the below findings:        Interpretation per the Radiologist below, if available at the time of this note:    No orders to display         ED BEDSIDE ULTRASOUND:   Performed by ED Physician - none    LABS:  Labs Reviewed - No data to display    All other labs were within normal range or not returned as of this dictation. RE-ASSESSMENT           EMERGENCY DEPARTMENT COURSE and DIFFERENTIALDIAGNOSIS/MDM:   Vitals:    Vitals:    03/27/21 1507   BP: 134/88   Pulse: 85   Resp: 20   Temp: 97.9 °F (36.6 °C)   SpO2: 94%   Weight: 150 lb (68 kg)   Height: 5' 2\" (1.575 m)       MDM      CONSULTS:  None    PROCEDURES:  Unless otherwise notedbelow, none     Procedures    FINAL IMPRESSION     1. Olecranon bursitis of left elbow          DISPOSITION/PLAN   DISPOSITION Decision To Discharge 03/27/2021 05:04:53 PM      PATIENT REFERRED TO:  Lily Triana MD  [address] [place], [place] [phone]    Schedule an appointment as soon as possible for a visit         DISCHARGE MEDICATIONS:    Attestation: The Prescription Monitoring Report for this patient was reviewed today. ([id number] Pt rx'd tramadol and xanax by her pmd) MANDIE Osullivan)  Periodic Controlled Substance Monitoring: Possible medication side effects, risk of tolerance/dependence & alternative treatments discussed., No signs of potential drug abuse or diversion identified.  MANDIE Osullivan)  Discharge Medication List as of 3/27/2021  5:05 PM           Medication List      ASK your doctor about these medications    ALPRAZolam 1 MG tablet  Commonly known as: XANAX     carvedilol 12.5 MG tablet  Commonly known as: COREG  Take 1 tablet by mouth 2 times daily (with meals)     hydrALAZINE 25 MG tablet  Commonly known as:  APRESOLINE  Take 1 tablet by mouth every 8 hours     lisinopril-hydroCHLOROthiazide 10-12.5 MG per tablet  Commonly known as: PRINZIDE;ZESTORETIC     meloxicam 7.5 MG tablet  Commonly known as: MOBIC     TYLENOL PM EXTRA STRENGTH PO            (Pleasenote that portions of this note were completed with a voice recognition program.  Efforts were made to edit the dictations but occasionally words are mis-transcribed.)              MANDIE Witt  03/27/21 [id number]

## 2021-07-05 ENCOUNTER — APPOINTMENT (OUTPATIENT)
Dept: GENERAL RADIOLOGY | Facility: HOSPITAL | Age: 71
End: 2021-07-05

## 2021-07-05 ENCOUNTER — HOSPITAL ENCOUNTER (EMERGENCY)
Facility: HOSPITAL | Age: 71
Discharge: HOME | End: 2021-07-06
Attending: EMERGENCY MEDICINE | Admitting: EMERGENCY MEDICINE

## 2021-07-05 ENCOUNTER — APPOINTMENT (OUTPATIENT)
Dept: CT IMAGING | Facility: HOSPITAL | Age: 71
End: 2021-07-05

## 2021-07-05 DIAGNOSIS — R10.84 GENERALIZED ABDOMINAL PAIN: ICD-10-CM

## 2021-07-05 DIAGNOSIS — R11.2 NON-INTRACTABLE VOMITING WITH NAUSEA, UNSPECIFIED VOMITING TYPE: Primary | ICD-10-CM

## 2021-07-05 DIAGNOSIS — B34.9 VIRAL ILLNESS: ICD-10-CM

## 2021-07-05 LAB
ALBUMIN SERPL-MCNC: 4.6 G/DL (ref 3.5–5.2)
ALBUMIN/GLOB SERPL: 1.4 G/DL
ALP SERPL-CCNC: 76 U/L (ref 39–117)
ALT SERPL W P-5'-P-CCNC: 12 U/L (ref 1–33)
ANION GAP SERPL CALCULATED.3IONS-SCNC: 14 MMOL/L (ref 5–15)
AST SERPL-CCNC: 13 U/L (ref 1–32)
BASOPHILS # BLD AUTO: 0.07 10*3/MM3 (ref 0–0.2)
BASOPHILS NFR BLD AUTO: 0.6 % (ref 0–1.5)
BILIRUB SERPL-MCNC: 0.3 MG/DL (ref 0–1.2)
BUN SERPL-MCNC: 26 MG/DL (ref 8–23)
BUN/CREAT SERPL: 26 (ref 7–25)
CALCIUM SPEC-SCNC: 9.9 MG/DL (ref 8.6–10.5)
CHLORIDE SERPL-SCNC: 98 MMOL/L (ref 98–107)
CO2 SERPL-SCNC: 22 MMOL/L (ref 22–29)
CREAT SERPL-MCNC: 1 MG/DL (ref 0.57–1)
DEPRECATED RDW RBC AUTO: 47.8 FL (ref 37–54)
EOSINOPHIL # BLD AUTO: 0.2 10*3/MM3 (ref 0–0.4)
EOSINOPHIL NFR BLD AUTO: 1.8 % (ref 0.3–6.2)
ERYTHROCYTE [DISTWIDTH] IN BLOOD BY AUTOMATED COUNT: 15.1 % (ref 12.3–15.4)
GFR SERPL CREATININE-BSD FRML MDRD: 55 ML/MIN/1.73
GLOBULIN UR ELPH-MCNC: 3.4 GM/DL
GLUCOSE SERPL-MCNC: 141 MG/DL (ref 65–99)
HCT VFR BLD AUTO: 31.6 % (ref 34–46.6)
HGB BLD-MCNC: 10.4 G/DL (ref 12–15.9)
IMM GRANULOCYTES # BLD AUTO: 0.06 10*3/MM3 (ref 0–0.05)
IMM GRANULOCYTES NFR BLD AUTO: 0.5 % (ref 0–0.5)
INR PPP: 1.03 (ref 0.91–1.09)
LIPASE SERPL-CCNC: 28 U/L (ref 13–60)
LYMPHOCYTES # BLD AUTO: 2.44 10*3/MM3 (ref 0.7–3.1)
LYMPHOCYTES NFR BLD AUTO: 21.5 % (ref 19.6–45.3)
MAGNESIUM SERPL-MCNC: 1.6 MG/DL (ref 1.6–2.4)
MCH RBC QN AUTO: 28.3 PG (ref 26.6–33)
MCHC RBC AUTO-ENTMCNC: 32.9 G/DL (ref 31.5–35.7)
MCV RBC AUTO: 86.1 FL (ref 79–97)
MONOCYTES # BLD AUTO: 0.59 10*3/MM3 (ref 0.1–0.9)
MONOCYTES NFR BLD AUTO: 5.2 % (ref 5–12)
NEUTROPHILS NFR BLD AUTO: 7.98 10*3/MM3 (ref 1.7–7)
NEUTROPHILS NFR BLD AUTO: 70.4 % (ref 42.7–76)
NRBC BLD AUTO-RTO: 0 /100 WBC (ref 0–0.2)
PLATELET # BLD AUTO: 293 10*3/MM3 (ref 140–450)
PMV BLD AUTO: 9.4 FL (ref 6–12)
POTASSIUM SERPL-SCNC: 5.2 MMOL/L (ref 3.5–5.2)
PROT SERPL-MCNC: 8 G/DL (ref 6–8.5)
PROTHROMBIN TIME: 12.7 SECONDS (ref 11.5–13.4)
RBC # BLD AUTO: 3.67 10*6/MM3 (ref 3.77–5.28)
SARS-COV-2 RNA PNL SPEC NAA+PROBE: NOT DETECTED
SODIUM SERPL-SCNC: 134 MMOL/L (ref 136–145)
WBC # BLD AUTO: 11.34 10*3/MM3 (ref 3.4–10.8)

## 2021-07-05 PROCEDURE — 85610 PROTHROMBIN TIME: CPT | Performed by: EMERGENCY MEDICINE

## 2021-07-05 PROCEDURE — 80053 COMPREHEN METABOLIC PANEL: CPT | Performed by: EMERGENCY MEDICINE

## 2021-07-05 PROCEDURE — 71045 X-RAY EXAM CHEST 1 VIEW: CPT

## 2021-07-05 PROCEDURE — 87635 SARS-COV-2 COVID-19 AMP PRB: CPT | Performed by: EMERGENCY MEDICINE

## 2021-07-05 PROCEDURE — 85025 COMPLETE CBC W/AUTO DIFF WBC: CPT | Performed by: EMERGENCY MEDICINE

## 2021-07-05 PROCEDURE — 74177 CT ABD & PELVIS W/CONTRAST: CPT

## 2021-07-05 PROCEDURE — 96376 TX/PRO/DX INJ SAME DRUG ADON: CPT

## 2021-07-05 PROCEDURE — 25010000002 IOPAMIDOL 61 % SOLUTION: Performed by: EMERGENCY MEDICINE

## 2021-07-05 PROCEDURE — 99283 EMERGENCY DEPT VISIT LOW MDM: CPT

## 2021-07-05 PROCEDURE — 25010000002 ONDANSETRON PER 1 MG: Performed by: EMERGENCY MEDICINE

## 2021-07-05 PROCEDURE — 83735 ASSAY OF MAGNESIUM: CPT | Performed by: EMERGENCY MEDICINE

## 2021-07-05 PROCEDURE — 96374 THER/PROPH/DIAG INJ IV PUSH: CPT

## 2021-07-05 PROCEDURE — 83690 ASSAY OF LIPASE: CPT | Performed by: EMERGENCY MEDICINE

## 2021-07-05 RX ORDER — ONDANSETRON 2 MG/ML
4 INJECTION INTRAMUSCULAR; INTRAVENOUS ONCE
Status: COMPLETED | OUTPATIENT
Start: 2021-07-05 | End: 2021-07-05

## 2021-07-05 RX ORDER — SODIUM CHLORIDE 0.9 % (FLUSH) 0.9 %
10 SYRINGE (ML) INJECTION AS NEEDED
Status: DISCONTINUED | OUTPATIENT
Start: 2021-07-05 | End: 2021-07-06 | Stop reason: HOSPADM

## 2021-07-05 RX ADMIN — ONDANSETRON 4 MG: 2 INJECTION INTRAMUSCULAR; INTRAVENOUS at 22:23

## 2021-07-05 RX ADMIN — SODIUM CHLORIDE, POTASSIUM CHLORIDE, SODIUM LACTATE AND CALCIUM CHLORIDE 1000 ML: 600; 310; 30; 20 INJECTION, SOLUTION INTRAVENOUS at 20:34

## 2021-07-05 RX ADMIN — ONDANSETRON 4 MG: 2 INJECTION INTRAMUSCULAR; INTRAVENOUS at 20:32

## 2021-07-05 RX ADMIN — IOPAMIDOL 100 ML: 612 INJECTION, SOLUTION INTRAVENOUS at 21:19

## 2021-07-06 VITALS
BODY MASS INDEX: 27.6 KG/M2 | SYSTOLIC BLOOD PRESSURE: 176 MMHG | HEART RATE: 90 BPM | HEIGHT: 62 IN | WEIGHT: 150 LBS | RESPIRATION RATE: 16 BRPM | TEMPERATURE: 98 F | OXYGEN SATURATION: 97 % | DIASTOLIC BLOOD PRESSURE: 82 MMHG

## 2021-07-06 RX ORDER — FAMOTIDINE 20 MG/1
20 TABLET, FILM COATED ORAL 2 TIMES DAILY
Qty: 20 TABLET | Refills: 0 | Status: SHIPPED | OUTPATIENT
Start: 2021-07-06

## 2021-07-06 RX ORDER — ONDANSETRON 4 MG/1
4 TABLET, ORALLY DISINTEGRATING ORAL EVERY 8 HOURS PRN
Qty: 10 TABLET | Refills: 0 | Status: SHIPPED | OUTPATIENT
Start: 2021-07-06

## 2021-07-06 NOTE — ED PROVIDER NOTES
Subjective   70-year-old female presents to the ED with complaint of nausea, vomiting, diarrhea, cough congestion.  History of hypertension, tobacco abuse.  Patient reports onset of symptoms upon waking this morning.  States she developed some nausea shortly after waking.  Began to have some nonbloody nonbilious vomiting around noon.  She reports associated mild crampy abdominal comfort and occasional watery, nonbloody nonmelanic diarrhea.  She also has mild cough and congestion.  No shortness of breath, no fevers or chills, no chest pain or shortness of breath.  She rates her symptoms as moderate severity with no aggravating or alleviating factors.      History provided by:  Patient      Review of Systems   All other systems reviewed and are negative.      Past Medical History:   Diagnosis Date   • Hypertension        Allergies   Allergen Reactions   • Codeine        Past Surgical History:   Procedure Laterality Date   • BLEPHAROPLASTY     • CATARACT EXTRACTION, BILATERAL     • CERVICAL LAMINECTOMY     • CHOLECYSTECTOMY     • COLONOSCOPY  10/01/2014    polyps 4/  diverticulosis/  internal hemorrhoids   • COLONOSCOPY N/A 9/7/2017    Procedure: COLONOSCOPY WITH ANESTHESIA;  Surgeon: Dario Cordero DO;  Location: [facility] ENDOSCOPY;  Service:    • HYSTERECTOMY     • KNEE SURGERY     • SIGMOIDOSCOPY      resection   • TUBAL ABDOMINAL LIGATION         Family History   Problem Relation Age of Onset   • Colon cancer Paternal Aunt    • Colon cancer Paternal Uncle    • Esophageal cancer Neg Hx        Social History     Socioeconomic History   • Marital status:      Spouse name: Not on file   • Number of children: Not on file   • Years of education: Not on file   • Highest education level: Not on file   Tobacco Use   • Smoking status: Current Every Day Smoker     Packs/day: 1.50     Years: 55.00     Pack years: 82.50   • Smokeless tobacco: Never Used   Vaping Use   • Vaping Use: Never used   Substance and Sexual  Activity   • Alcohol use: No   • Drug use: No   • Sexual activity: Defer           Objective   Physical Exam  Vitals and nursing note reviewed.   Constitutional:       General: She is not in acute distress.     Appearance: Normal appearance. She is normal weight.   HENT:      Head: Normocephalic and atraumatic.      Nose: Nose normal.      Mouth/Throat:      Mouth: Mucous membranes are moist.      Pharynx: Oropharynx is clear. No oropharyngeal exudate or posterior oropharyngeal erythema.   Eyes:      Extraocular Movements: Extraocular movements intact.      Conjunctiva/sclera: Conjunctivae normal.      Pupils: Pupils are equal, round, and reactive to light.   Cardiovascular:      Rate and Rhythm: Normal rate and regular rhythm.      Pulses: Normal pulses.      Heart sounds: Normal heart sounds.   Pulmonary:      Effort: Pulmonary effort is normal.      Breath sounds: Normal breath sounds. No wheezing, rhonchi or rales.   Abdominal:      General: Abdomen is flat. Bowel sounds are normal. There is no distension.      Palpations: Abdomen is soft.      Tenderness: There is no abdominal tenderness.   Musculoskeletal:         General: No tenderness. Normal range of motion.      Cervical back: Normal range of motion and neck supple. No rigidity. No muscular tenderness.      Right lower leg: No edema.      Left lower leg: No edema.   Skin:     General: Skin is warm and dry.      Capillary Refill: Capillary refill takes less than 2 seconds.      Findings: No rash.   Neurological:      General: No focal deficit present.      Mental Status: She is alert and oriented to person, place, and time. Mental status is at baseline.      Cranial Nerves: No cranial nerve deficit.      Sensory: No sensory deficit.      Motor: No weakness.   Psychiatric:         Mood and Affect: Mood normal.         Behavior: Behavior normal.         Thought Content: Thought content normal.         Procedures       Lab Results (last 24 hours)     Procedure  Component Value Units Date/Time    COVID-19,[facility] IN-HOUSE,Nasal Swab No Transport Media 3-4 HR TAT - Swab, Nasal Cavity [[id number]]  (Normal) Collected: 07/05/21 2032    Specimen: Swab from Nasal Cavity Updated: 07/05/21 2118     COVID19 Not Detected    Narrative:      Fact sheet for providers: https://www.fda.gov/media/681816/download     Fact sheet for patients: https://www.fda.gov/media/316817/download    Test performed by PCR.    Consider negative results in combination with clinical observations, patient history, and epidemiological information.    CBC & Differential [[id number]]  (Abnormal) Collected: 07/05/21 2033    Specimen: Blood Updated: 07/05/21 2048    Narrative:      The following orders were created for panel order CBC & Differential.  Procedure                               Abnormality         Status                     ---------                               -----------         ------                     CBC Auto Differential[[id number]]        Abnormal            Final result                 Please view results for these tests on the individual orders.    Comprehensive Metabolic Panel [[id number]]  (Abnormal) Collected: 07/05/21 2033    Specimen: Blood Updated: 07/05/21 2104     Glucose 141 mg/dL      BUN 26 mg/dL      Creatinine 1.00 mg/dL      Sodium 134 mmol/L      Potassium 5.2 mmol/L      Chloride 98 mmol/L      CO2 22.0 mmol/L      Calcium 9.9 mg/dL      Total Protein 8.0 g/dL      Albumin 4.60 g/dL      ALT (SGPT) 12 U/L      AST (SGOT) 13 U/L      Alkaline Phosphatase 76 U/L      Total Bilirubin 0.3 mg/dL      eGFR Non African Amer 55 mL/min/1.73      Globulin 3.4 gm/dL      A/G Ratio 1.4 g/dL      BUN/Creatinine Ratio 26.0     Anion Gap 14.0 mmol/L     Narrative:      GFR Normal >60  Chronic Kidney Disease <60  Kidney Failure <15      Protime-INR [[id number]]  (Normal) Collected: 07/05/21 2033    Specimen: Blood Updated: 07/05/21 2056     Protime 12.7 Seconds      INR 1.03    Lipase  [[id number]]  (Normal) Collected: 07/05/21 2033    Specimen: Blood Updated: 07/05/21 2059     Lipase 28 U/L     Magnesium [[id number]]  (Normal) Collected: 07/05/21 2033    Specimen: Blood Updated: 07/05/21 2059     Magnesium 1.6 mg/dL     CBC Auto Differential [[id number]]  (Abnormal) Collected: 07/05/21 2033    Specimen: Blood Updated: 07/05/21 2048     WBC 11.34 10*3/mm3      RBC 3.67 10*6/mm3      Hemoglobin 10.4 g/dL      Hematocrit 31.6 %      MCV 86.1 fL      MCH 28.3 pg      MCHC 32.9 g/dL      RDW 15.1 %      RDW-SD 47.8 fl      MPV 9.4 fL      Platelets 293 10*3/mm3      Neutrophil % 70.4 %      Lymphocyte % 21.5 %      Monocyte % 5.2 %      Eosinophil % 1.8 %      Basophil % 0.6 %      Immature Grans % 0.5 %      Neutrophils, Absolute 7.98 10*3/mm3      Lymphocytes, Absolute 2.44 10*3/mm3      Monocytes, Absolute 0.59 10*3/mm3      Eosinophils, Absolute 0.20 10*3/mm3      Basophils, Absolute 0.07 10*3/mm3      Immature Grans, Absolute 0.06 10*3/mm3      nRBC 0.0 /100 WBC       CT Abdomen Pelvis With Contrast    Result Date: 7/5/2021  EXAM: CT ABDOMEN PELVIS W CONTRAST- - 7/5/2021 9:17 PM CDT  HISTORY: abd pain, nausea, vomiting, leukocytosis   COMPARISON: 8/8/2020.  DOSE LENGTH PRODUCT: 349 mGy cm. Automatic exposure control was utilized to make radiation dose as low as reasonably achievable.  TECHNIQUE: Enhanced  axial images of the abdomen and pelvis obtained with multiplanar reformats.  FINDINGS: VISUALIZED CHEST: No pleural or pericardial effusion. Lung bases clear.  LIVER: Fatty liver. A few stable hypodensities. Largest at the right liver measures 1.6 cm, favored to represent a hemangioma or cyst. Patent portal and hepatic veins.  BILIARY: Cholecystectomy clips. No bile duct dilation.  PANCREAS: Normal pancreas contour and enhancement.  SPLEEN: Normal size and contour.  ADRENAL: Stable subcentimeter right adrenal nodule compared to 8/8/2020. Left adrenal gland appears within normal limits.   GENITOURINARY: No hydronephrosis, urolithiasis or solid renal lesion. Tiny left upper pole renal hypodensity, statistically favored to be benign. Small renovascular calcification on the left. Urinary bladder is within normal limits. Uterus appears absent. Adnexa not discretely identified.  PERITONEUM: No free air or ascites.  GI TRACT: Normal CT appearance of the stomach. Normal C-sweep of the duodenum. Possible mild thickening of the proximal duodenum such as axial image 36-38. No evidence of bowel obstruction. Changes of prior appendectomy.  VESSELS: Aorta normal in course and caliber with heavily calcified atherosclerosis. Branch vessels grossly patent, not optimally evaluated on this non-CTA exam. RETROPERITONEUM: No mass, lymphadenopathy or hemorrhage.  SOFT TISSUES: Normal appearance of the overlying abdominal soft tissues.   BONES: No acute or aggressive bony lesion. Mild degenerative changes in the spine.       1. Possible mild thickening of the proximal duodenum, could be seen with duodenitis. No associated surrounding fat stranding. 2. Fatty liver. A few stable low-density liver lesions compared to 8/8/2020, favored to be benign. 3. Stable subcentimeter right adrenal nodule compared to 8/8/2020. 4. Heavily calcified aortic atherosclerosis. 5. Cholecystectomy, hysterectomy, appendectomy. This report was finalized on 07/05/2021 21:34 by Dr Sonja Sultana MD.    XR Chest 1 View    Result Date: 7/5/2021  EXAM: XR CHEST 1 VW- - 7/5/2021 8:39 PM CDT  HISTORY: cough, vomiting   COMPARISON: 8/8/2020.  TECHNIQUE:  1 images.  Frontal view of the chest.  FINDINGS:  No pneumothorax or pleural effusion. Left basilar opacity. Cardiac mediastinal silhouette appear within normal limits. Calcified aortic atherosclerosis. No acute bony finding. Fixation hardware at the cervical spine.       1. Left basilar opacity. This report was finalized on 07/05/2021 20:43 by Dr Sonja Sultana MD.      ED Course  ED Course as of Jul  06 0011   Mon Jul 05, 2021   2356 Patient feeling  better, nausea has resolved. CBC revealed elevated wbc. CT shows possible mild thickening of duodenum. No additional acute intrabdominal abnormalities. Left lung base haziness that was seen on cxr not seen on ct, doubt pneumonia. More likely viral syndrome. Covid not detected. Will dc with antiemetics and pepcid, have pt f/u with pmd, return to ED for worsening symptoms.     [AW]      ED Course User Index  [AW] Tyler Edwards MD                                           MDM  Number of Diagnoses or Management Options  Generalized abdominal pain: new and requires workup  Non-intractable vomiting with nausea, unspecified vomiting type: new and requires workup  Viral illness: new and requires workup     Amount and/or Complexity of Data Reviewed  Clinical lab tests: reviewed  Tests in the radiology section of CPT®: reviewed    Risk of Complications, Morbidity, and/or Mortality  Presenting problems: high  Diagnostic procedures: high  Management options: high    Patient Progress  Patient progress: improved       Final diagnoses:   Non-intractable vomiting with nausea, unspecified vomiting type   Generalized abdominal pain   Viral illness       ED Disposition  ED Disposition     ED Disposition Condition Comment    Discharge Stable           Harshal Gabriel MD  [address] [place] [address] [id number]  [facility] [address]  [phone]    In 2 days           Medication List      New Prescriptions    famotidine 20 MG tablet  Commonly known as: PEPCID  Take 1 tablet by mouth 2 (Two) Times a Day.     ondansetron ODT 4 MG disintegrating tablet  Commonly known as: ZOFRAN-ODT  Place 1 tablet on the tongue Every 8 (Eight) Hours As Needed for Nausea or Vomiting.           Where to Get Your Medications      These medications were sent to [facility]/pharmacy #[id number] - [place], [place] - [address] AT ACROSS FROM [address]  [phone] [facility] [phone]   [address] [place]  [address]    Hours: 24-hours Phone: [phone]   · famotidine 20 MG tablet  · ondansetron ODT 4 MG disintegrating tablet          Tyler Edwards MD  07/06/21 0011

## 2021-09-16 ENCOUNTER — HOSPITAL ENCOUNTER (INPATIENT)
Age: 71
LOS: 1 days | Discharge: HOME | DRG: 389 | End: 2021-09-17
Attending: PEDIATRICS | Admitting: INTERNAL MEDICINE
Payer: MEDICARE

## 2021-09-16 ENCOUNTER — APPOINTMENT (OUTPATIENT)
Dept: CT IMAGING | Age: 71
DRG: 389 | End: 2021-09-16
Payer: MEDICARE

## 2021-09-16 ENCOUNTER — APPOINTMENT (OUTPATIENT)
Dept: GENERAL RADIOLOGY | Age: 71
DRG: 389 | End: 2021-09-16
Payer: MEDICARE

## 2021-09-16 DIAGNOSIS — N39.0 ACUTE URINARY TRACT INFECTION: ICD-10-CM

## 2021-09-16 DIAGNOSIS — R11.2 NAUSEA AND VOMITING, INTRACTABILITY OF VOMITING NOT SPECIFIED, UNSPECIFIED VOMITING TYPE: ICD-10-CM

## 2021-09-16 DIAGNOSIS — K56.609 SMALL BOWEL OBSTRUCTION (HCC): Primary | ICD-10-CM

## 2021-09-16 DIAGNOSIS — E87.5 HYPERKALEMIA: ICD-10-CM

## 2021-09-16 DIAGNOSIS — E86.0 DEHYDRATION: ICD-10-CM

## 2021-09-16 PROBLEM — E87.1 HYPONATREMIA: Status: ACTIVE | Noted: 2021-09-16

## 2021-09-16 PROBLEM — D72.829 LEUKOCYTOSIS: Status: ACTIVE | Noted: 2021-09-16

## 2021-09-16 PROBLEM — N18.9 CKD (CHRONIC KIDNEY DISEASE): Status: ACTIVE | Noted: 2021-09-16

## 2021-09-16 PROBLEM — Z72.0 TOBACCO ABUSE: Status: ACTIVE | Noted: 2021-09-16

## 2021-09-16 PROBLEM — R65.10 SIRS (SYSTEMIC INFLAMMATORY RESPONSE SYNDROME) (HCC): Status: ACTIVE | Noted: 2021-09-16

## 2021-09-16 PROBLEM — K56.600 PARTIAL SMALL BOWEL OBSTRUCTION (HCC): Status: ACTIVE | Noted: 2021-09-16

## 2021-09-16 LAB
ALBUMIN SERPL-MCNC: 5 G/DL (ref 3.5–5.2)
ALP BLD-CCNC: 94 U/L (ref 35–104)
ALT SERPL-CCNC: 12 U/L (ref 5–33)
ANION GAP SERPL CALCULATED.3IONS-SCNC: 14 MMOL/L (ref 7–19)
AST SERPL-CCNC: 12 U/L (ref 5–32)
BACTERIA: ABNORMAL /HPF
BASOPHILS ABSOLUTE: 0.1 K/UL (ref 0–0.2)
BASOPHILS RELATIVE PERCENT: 0.5 % (ref 0–1)
BILIRUB SERPL-MCNC: <0.2 MG/DL (ref 0.2–1.2)
BILIRUBIN URINE: NEGATIVE
BLOOD, URINE: NEGATIVE
BUN BLDV-MCNC: 39 MG/DL (ref 8–23)
CALCIUM SERPL-MCNC: 9.6 MG/DL (ref 8.8–10.2)
CHLORIDE BLD-SCNC: 98 MMOL/L (ref 98–111)
CLARITY: CLEAR
CO2: 17 MMOL/L (ref 22–29)
COLOR: YELLOW
CREAT SERPL-MCNC: 1.3 MG/DL (ref 0.5–0.9)
CRYSTALS, UA: ABNORMAL /HPF
EOSINOPHILS ABSOLUTE: 0.2 K/UL (ref 0–0.6)
EOSINOPHILS RELATIVE PERCENT: 0.8 % (ref 0–5)
EPITHELIAL CELLS, UA: 1 /HPF (ref 0–5)
GFR AFRICAN AMERICAN: 49
GFR NON-AFRICAN AMERICAN: 40
GLUCOSE BLD-MCNC: 114 MG/DL (ref 70–99)
GLUCOSE BLD-MCNC: 165 MG/DL (ref 70–99)
GLUCOSE BLD-MCNC: 231 MG/DL (ref 74–109)
GLUCOSE URINE: 100 MG/DL
HCT VFR BLD CALC: 36.6 % (ref 37–47)
HEMOGLOBIN: 11.2 G/DL (ref 12–16)
HYALINE CASTS: 2 /HPF (ref 0–8)
IMMATURE GRANULOCYTES #: 0.1 K/UL
KETONES, URINE: NEGATIVE MG/DL
LACTIC ACID: 1.8 MMOL/L (ref 0.5–1.9)
LEUKOCYTE ESTERASE, URINE: NEGATIVE
LIPASE: 38 U/L (ref 13–60)
LYMPHOCYTES ABSOLUTE: 2.5 K/UL (ref 1.1–4.5)
LYMPHOCYTES RELATIVE PERCENT: 12.2 % (ref 20–40)
MCH RBC QN AUTO: 28.9 PG (ref 27–31)
MCHC RBC AUTO-ENTMCNC: 30.6 G/DL (ref 33–37)
MCV RBC AUTO: 94.3 FL (ref 81–99)
MONOCYTES ABSOLUTE: 0.6 K/UL (ref 0–0.9)
MONOCYTES RELATIVE PERCENT: 2.9 % (ref 0–10)
NEUTROPHILS ABSOLUTE: 17.1 K/UL (ref 1.5–7.5)
NEUTROPHILS RELATIVE PERCENT: 83 % (ref 50–65)
NITRITE, URINE: POSITIVE
PDW BLD-RTO: 15 % (ref 11.5–14.5)
PERFORMED ON: ABNORMAL
PERFORMED ON: ABNORMAL
PH UA: 5.5 (ref 5–8)
PLATELET # BLD: 321 K/UL (ref 130–400)
PMV BLD AUTO: 9.6 FL (ref 9.4–12.3)
POTASSIUM SERPL-SCNC: 5.5 MMOL/L (ref 3.5–5)
POTASSIUM SERPL-SCNC: 5.9 MMOL/L (ref 3.5–5)
PROTEIN UA: 100 MG/DL
RBC # BLD: 3.88 M/UL (ref 4.2–5.4)
RBC UA: 6 /HPF (ref 0–4)
SARS-COV-2, NAAT: NOT DETECTED
SODIUM BLD-SCNC: 129 MMOL/L (ref 136–145)
SPECIFIC GRAVITY UA: 1.02 (ref 1–1.03)
TOTAL PROTEIN: 8.5 G/DL (ref 6.6–8.7)
TROPONIN: <0.01 NG/ML (ref 0–0.03)
UROBILINOGEN, URINE: 0.2 E.U./DL
WBC # BLD: 20.7 K/UL (ref 4.8–10.8)
WBC UA: 6 /HPF (ref 0–5)

## 2021-09-16 PROCEDURE — 6360000002 HC RX W HCPCS: Performed by: INTERNAL MEDICINE

## 2021-09-16 PROCEDURE — 36415 COLL VENOUS BLD VENIPUNCTURE: CPT

## 2021-09-16 PROCEDURE — 81001 URINALYSIS AUTO W/SCOPE: CPT

## 2021-09-16 PROCEDURE — 2500000003 HC RX 250 WO HCPCS: Performed by: INTERNAL MEDICINE

## 2021-09-16 PROCEDURE — 96366 THER/PROPH/DIAG IV INF ADDON: CPT

## 2021-09-16 PROCEDURE — 74250 X-RAY XM SM INT 1CNTRST STD: CPT

## 2021-09-16 PROCEDURE — 80053 COMPREHEN METABOLIC PANEL: CPT

## 2021-09-16 PROCEDURE — 96365 THER/PROPH/DIAG IV INF INIT: CPT

## 2021-09-16 PROCEDURE — 74019 RADEX ABDOMEN 2 VIEWS: CPT

## 2021-09-16 PROCEDURE — 93005 ELECTROCARDIOGRAM TRACING: CPT | Performed by: PEDIATRICS

## 2021-09-16 PROCEDURE — 82947 ASSAY GLUCOSE BLOOD QUANT: CPT

## 2021-09-16 PROCEDURE — 83605 ASSAY OF LACTIC ACID: CPT

## 2021-09-16 PROCEDURE — 85025 COMPLETE CBC W/AUTO DIFF WBC: CPT

## 2021-09-16 PROCEDURE — 94640 AIRWAY INHALATION TREATMENT: CPT

## 2021-09-16 PROCEDURE — 99222 1ST HOSP IP/OBS MODERATE 55: CPT | Performed by: SURGERY

## 2021-09-16 PROCEDURE — 1210000000 HC MED SURG R&B

## 2021-09-16 PROCEDURE — 87635 SARS-COV-2 COVID-19 AMP PRB: CPT

## 2021-09-16 PROCEDURE — 99283 EMERGENCY DEPT VISIT LOW MDM: CPT

## 2021-09-16 PROCEDURE — 83690 ASSAY OF LIPASE: CPT

## 2021-09-16 PROCEDURE — 96375 TX/PRO/DX INJ NEW DRUG ADDON: CPT

## 2021-09-16 PROCEDURE — 84132 ASSAY OF SERUM POTASSIUM: CPT

## 2021-09-16 PROCEDURE — 74176 CT ABD & PELVIS W/O CONTRAST: CPT

## 2021-09-16 PROCEDURE — 2580000003 HC RX 258: Performed by: INTERNAL MEDICINE

## 2021-09-16 PROCEDURE — 87040 BLOOD CULTURE FOR BACTERIA: CPT

## 2021-09-16 PROCEDURE — 6370000000 HC RX 637 (ALT 250 FOR IP): Performed by: INTERNAL MEDICINE

## 2021-09-16 PROCEDURE — 2580000003 HC RX 258: Performed by: PEDIATRICS

## 2021-09-16 PROCEDURE — 84484 ASSAY OF TROPONIN QUANT: CPT

## 2021-09-16 PROCEDURE — 6360000002 HC RX W HCPCS: Performed by: PEDIATRICS

## 2021-09-16 RX ORDER — SODIUM CHLORIDE 0.9 % (FLUSH) 0.9 %
5-40 SYRINGE (ML) INJECTION EVERY 12 HOURS SCHEDULED
Status: DISCONTINUED | OUTPATIENT
Start: 2021-09-16 | End: 2021-09-17 | Stop reason: HOSPADM

## 2021-09-16 RX ORDER — ONDANSETRON 2 MG/ML
4 INJECTION INTRAMUSCULAR; INTRAVENOUS ONCE
Status: COMPLETED | OUTPATIENT
Start: 2021-09-16 | End: 2021-09-16

## 2021-09-16 RX ORDER — 0.9 % SODIUM CHLORIDE 0.9 %
1000 INTRAVENOUS SOLUTION INTRAVENOUS ONCE
Status: COMPLETED | OUTPATIENT
Start: 2021-09-16 | End: 2021-09-16

## 2021-09-16 RX ORDER — ONDANSETRON 2 MG/ML
4 INJECTION INTRAMUSCULAR; INTRAVENOUS EVERY 6 HOURS PRN
Status: DISCONTINUED | OUTPATIENT
Start: 2021-09-16 | End: 2021-09-17 | Stop reason: HOSPADM

## 2021-09-16 RX ORDER — SODIUM CHLORIDE 0.9 % (FLUSH) 0.9 %
5-40 SYRINGE (ML) INJECTION PRN
Status: DISCONTINUED | OUTPATIENT
Start: 2021-09-16 | End: 2021-09-17 | Stop reason: HOSPADM

## 2021-09-16 RX ORDER — CALCIUM GLUCONATE 20 MG/ML
1000 INJECTION, SOLUTION INTRAVENOUS ONCE
Status: COMPLETED | OUTPATIENT
Start: 2021-09-16 | End: 2021-09-16

## 2021-09-16 RX ORDER — SODIUM CHLORIDE, SODIUM LACTATE, POTASSIUM CHLORIDE, CALCIUM CHLORIDE 600; 310; 30; 20 MG/100ML; MG/100ML; MG/100ML; MG/100ML
INJECTION, SOLUTION INTRAVENOUS CONTINUOUS
Status: DISCONTINUED | OUTPATIENT
Start: 2021-09-16 | End: 2021-09-17 | Stop reason: HOSPADM

## 2021-09-16 RX ORDER — ACETAMINOPHEN 325 MG/1
650 TABLET ORAL EVERY 6 HOURS PRN
Status: DISCONTINUED | OUTPATIENT
Start: 2021-09-16 | End: 2021-09-17 | Stop reason: HOSPADM

## 2021-09-16 RX ORDER — ONDANSETRON 4 MG/1
4 TABLET, ORALLY DISINTEGRATING ORAL EVERY 8 HOURS PRN
Status: DISCONTINUED | OUTPATIENT
Start: 2021-09-16 | End: 2021-09-17 | Stop reason: HOSPADM

## 2021-09-16 RX ORDER — ACETAMINOPHEN 650 MG/1
650 SUPPOSITORY RECTAL EVERY 6 HOURS PRN
Status: DISCONTINUED | OUTPATIENT
Start: 2021-09-16 | End: 2021-09-17 | Stop reason: HOSPADM

## 2021-09-16 RX ORDER — NICOTINE 21 MG/24HR
1 PATCH, TRANSDERMAL 24 HOURS TRANSDERMAL DAILY
Status: DISCONTINUED | OUTPATIENT
Start: 2021-09-16 | End: 2021-09-17 | Stop reason: HOSPADM

## 2021-09-16 RX ORDER — SODIUM CHLORIDE 9 MG/ML
25 INJECTION, SOLUTION INTRAVENOUS PRN
Status: DISCONTINUED | OUTPATIENT
Start: 2021-09-16 | End: 2021-09-17 | Stop reason: HOSPADM

## 2021-09-16 RX ORDER — HYDROMORPHONE HYDROCHLORIDE 1 MG/ML
1 INJECTION, SOLUTION INTRAMUSCULAR; INTRAVENOUS; SUBCUTANEOUS EVERY 4 HOURS PRN
Status: DISCONTINUED | OUTPATIENT
Start: 2021-09-16 | End: 2021-09-17 | Stop reason: HOSPADM

## 2021-09-16 RX ORDER — DEXTROSE MONOHYDRATE 25 G/50ML
25 INJECTION, SOLUTION INTRAVENOUS PRN
Status: DISCONTINUED | OUTPATIENT
Start: 2021-09-16 | End: 2021-09-17 | Stop reason: HOSPADM

## 2021-09-16 RX ORDER — HYDRALAZINE HYDROCHLORIDE 20 MG/ML
10 INJECTION INTRAMUSCULAR; INTRAVENOUS EVERY 4 HOURS PRN
Status: DISCONTINUED | OUTPATIENT
Start: 2021-09-16 | End: 2021-09-17 | Stop reason: HOSPADM

## 2021-09-16 RX ORDER — IPRATROPIUM BROMIDE AND ALBUTEROL SULFATE 2.5; .5 MG/3ML; MG/3ML
1 SOLUTION RESPIRATORY (INHALATION)
Status: DISCONTINUED | OUTPATIENT
Start: 2021-09-16 | End: 2021-09-17 | Stop reason: HOSPADM

## 2021-09-16 RX ORDER — HYDROMORPHONE HYDROCHLORIDE 1 MG/ML
0.5 INJECTION, SOLUTION INTRAMUSCULAR; INTRAVENOUS; SUBCUTANEOUS EVERY 30 MIN PRN
Status: DISCONTINUED | OUTPATIENT
Start: 2021-09-16 | End: 2021-09-17 | Stop reason: HOSPADM

## 2021-09-16 RX ORDER — SODIUM CHLORIDE, SODIUM LACTATE, POTASSIUM CHLORIDE, CALCIUM CHLORIDE 600; 310; 30; 20 MG/100ML; MG/100ML; MG/100ML; MG/100ML
1000 INJECTION, SOLUTION INTRAVENOUS ONCE
Status: COMPLETED | OUTPATIENT
Start: 2021-09-16 | End: 2021-09-16

## 2021-09-16 RX ORDER — BISACODYL 10 MG
10 SUPPOSITORY, RECTAL RECTAL DAILY PRN
Status: DISCONTINUED | OUTPATIENT
Start: 2021-09-16 | End: 2021-09-17 | Stop reason: HOSPADM

## 2021-09-16 RX ADMIN — SODIUM CHLORIDE 1000 ML: 9 INJECTION, SOLUTION INTRAVENOUS at 02:41

## 2021-09-16 RX ADMIN — IPRATROPIUM BROMIDE AND ALBUTEROL SULFATE 1 AMPULE: .5; 2.5 SOLUTION RESPIRATORY (INHALATION) at 18:26

## 2021-09-16 RX ADMIN — FAMOTIDINE 20 MG: 10 INJECTION, SOLUTION INTRAVENOUS at 09:56

## 2021-09-16 RX ADMIN — IPRATROPIUM BROMIDE AND ALBUTEROL SULFATE 1 AMPULE: .5; 2.5 SOLUTION RESPIRATORY (INHALATION) at 15:12

## 2021-09-16 RX ADMIN — PIPERACILLIN AND TAZOBACTAM 3375 MG: 3; .375 INJECTION, POWDER, LYOPHILIZED, FOR SOLUTION INTRAVENOUS at 09:43

## 2021-09-16 RX ADMIN — SODIUM CHLORIDE, POTASSIUM CHLORIDE, SODIUM LACTATE AND CALCIUM CHLORIDE: 600; 310; 30; 20 INJECTION, SOLUTION INTRAVENOUS at 09:19

## 2021-09-16 RX ADMIN — SODIUM CHLORIDE, SODIUM LACTATE, POTASSIUM CHLORIDE, AND CALCIUM CHLORIDE 1000 ML: 600; 310; 30; 20 INJECTION, SOLUTION INTRAVENOUS at 05:30

## 2021-09-16 RX ADMIN — ONDANSETRON HYDROCHLORIDE 4 MG: 2 INJECTION, SOLUTION INTRAMUSCULAR; INTRAVENOUS at 02:41

## 2021-09-16 RX ADMIN — HYDROMORPHONE HYDROCHLORIDE 1 MG: 1 INJECTION, SOLUTION INTRAMUSCULAR; INTRAVENOUS; SUBCUTANEOUS at 20:05

## 2021-09-16 RX ADMIN — PIPERACILLIN AND TAZOBACTAM 3375 MG: 3; .375 INJECTION, POWDER, LYOPHILIZED, FOR SOLUTION INTRAVENOUS at 19:19

## 2021-09-16 RX ADMIN — IPRATROPIUM BROMIDE AND ALBUTEROL SULFATE 1 AMPULE: .5; 2.5 SOLUTION RESPIRATORY (INHALATION) at 07:38

## 2021-09-16 RX ADMIN — SODIUM CHLORIDE, PRESERVATIVE FREE 10 ML: 5 INJECTION INTRAVENOUS at 09:57

## 2021-09-16 RX ADMIN — CALCIUM GLUCONATE 1000 MG: 20 INJECTION, SOLUTION INTRAVENOUS at 09:43

## 2021-09-16 RX ADMIN — HYDROMORPHONE HYDROCHLORIDE 1 MG: 1 INJECTION, SOLUTION INTRAMUSCULAR; INTRAVENOUS; SUBCUTANEOUS at 12:44

## 2021-09-16 RX ADMIN — PIPERACILLIN AND TAZOBACTAM 3375 MG: 3; .375 INJECTION, POWDER, LYOPHILIZED, FOR SOLUTION INTRAVENOUS at 03:05

## 2021-09-16 RX ADMIN — ENOXAPARIN SODIUM 40 MG: 40 INJECTION SUBCUTANEOUS at 09:45

## 2021-09-16 RX ADMIN — IPRATROPIUM BROMIDE AND ALBUTEROL SULFATE 1 AMPULE: .5; 2.5 SOLUTION RESPIRATORY (INHALATION) at 11:13

## 2021-09-16 RX ADMIN — HYDROMORPHONE HYDROCHLORIDE 0.5 MG: 1 INJECTION, SOLUTION INTRAMUSCULAR; INTRAVENOUS; SUBCUTANEOUS at 02:42

## 2021-09-16 RX ADMIN — ONDANSETRON HYDROCHLORIDE 4 MG: 2 INJECTION, SOLUTION INTRAVENOUS at 16:22

## 2021-09-16 ASSESSMENT — ENCOUNTER SYMPTOMS
NAUSEA: 0
SHORTNESS OF BREATH: 0
BLOOD IN STOOL: 0
DIARRHEA: 0
VOMITING: 1
ABDOMINAL DISTENTION: 1
BACK PAIN: 1
COLOR CHANGE: 0
RHINORRHEA: 0
EYE REDNESS: 0
RESPIRATORY NEGATIVE: 1
VOMITING: 0
NAUSEA: 1
EYE PAIN: 0
EYES NEGATIVE: 1
CONSTIPATION: 0
BACK PAIN: 0
COUGH: 0
CHEST TIGHTNESS: 0
SORE THROAT: 0
CONSTIPATION: 1
ABDOMINAL PAIN: 1

## 2021-09-16 ASSESSMENT — PAIN DESCRIPTION - LOCATION: LOCATION: ABDOMEN

## 2021-09-16 ASSESSMENT — PAIN SCALES - GENERAL
PAINLEVEL_OUTOF10: 6
PAINLEVEL_OUTOF10: 8
PAINLEVEL_OUTOF10: 4
PAINLEVEL_OUTOF10: 10

## 2021-09-16 NOTE — ED NOTES
NG tube placed in Right Nare at 62cm.  Confirmed with free air flush and return of stomach content       Luba Sullivan RN  09/16/21 [id number]

## 2021-09-16 NOTE — PLAN OF CARE
Patient admitted this a.m. Writer assumes care of patient this a.m. Patient seen and examined. Doing well. Laying comfortably in bed in no acute distress. N.p.o., with NG tube in place. General surgery consulted on admission. Continue symptomatic and supportive management.

## 2021-09-16 NOTE — PROGRESS NOTES
Pharmacy Renal Adjustment    Eyad Pierre is a 66 y.o. female. Pharmacy has renally adjusted medications per protocol.     Recent Labs     09/16/21 0132   BUN 39*       Recent Labs     09/16/21 0132   CREATININE 1.3*       CrCl calculated to be 37 ml/min    Height:   Ht Readings from Last 1 Encounters:   03/27/21 5' 2\" (1.575 m)     Weight:  Wt Readings from Last 1 Encounters:   09/16/21 160 lb (72.6 kg)       Plan: Adjust the following medications based on renal function:           Famotidine 20 mg IV twice daily to 20 mg IV once daily    Electronically signed by Shirin Corea [facility] on 9/16/2021 at 7:21 AM

## 2021-09-16 NOTE — ED PROVIDER NOTES
[facility] EMERGENCY DEPT  eMERGENCY dEPARTMENT eNCOUnter      Pt Name: Jeannine Knox  MRN: [id number]  [place] 1950  Date of evaluation: 9/16/2021  Provider: MD Berto Moss       Chief Complaint   Patient presents with    Abdominal Pain         HISTORY OF PRESENT ILLNESS   (Location/Symptom, Timing/Onset,Context/Setting, Quality, Duration, Modifying Factors, Severity)  Note limiting factors. Jeannine Knox is a 66 y.o. female who presents to the emergency department with abdominal pain. Patient states the pain began around 6 or 7 PM tonight. Location is mid abdomen-periumbilical.  Patient states pain does not radiate. Patient denies exacerbating or alleviating factors. Severity is 10 out of 10. Patient states that she has had nausea and vomiting at home. Patient states she had a normal bowel movement that did not alleviate pain tonight. Patient denies fever, burning with urination, difficulty urinating, diarrhea, chest pain, or difficulty breathing. Patient states she has had similar pain with labor and at no other time. HPI    NursingNotes were reviewed. REVIEW OF SYSTEMS    (2-9 systems for level 4, 10 or more for level 5)     Review of Systems   Constitutional: Negative for chills and fever. HENT: Negative for congestion and rhinorrhea. Respiratory: Negative for cough and shortness of breath. Cardiovascular: Negative for chest pain and palpitations. Gastrointestinal: Positive for abdominal pain, nausea and vomiting. Negative for blood in stool, constipation and diarrhea. Genitourinary: Negative for difficulty urinating and dysuria. Musculoskeletal: Negative for back pain and neck pain. Skin: Negative for color change and rash. Neurological: Negative for syncope and light-headedness. Psychiatric/Behavioral: Negative for agitation and confusion. All other systems reviewed and are negative.            PAST MEDICALHISTORY     Past Medical History: Diagnosis Date    Anxiety     Arthritis     Back pain of lumbar region with sciatica     Chronic kidney disease     Disease of blood and blood forming organ     Hyperlipidemia     Hypertension     Movement disorder     Other disorders of kidney and ureter in diseases classified elsewhere     Panic attack     PONV (postoperative nausea and vomiting)          SURGICAL HISTORY       Past Surgical History:   Procedure Laterality Date    ABDOMEN SURGERY      APPENDECTOMY      BACK SURGERY      CERVICAL     BACK SURGERY      LUMBAR RUPTURED DISC    CHOLECYSTECTOMY      COLECTOMY      BLOCKAGE    COLONOSCOPY      EYE SURGERY      HYSTERECTOMY      JOINT REPLACEMENT      KNEE ARTHROSCOPY Right     SKIN BIOPSY      TOTAL KNEE ARTHROPLASTY Right 8/26/2016    KNEE TOTAL ARTHROPLASTY performed by Maxim Dallas DO at [facility]     Previous Medications    ALPRAZOLAM (XANAX) 1 MG TABLET    Take 1 mg by mouth 4 times daily.     CARVEDILOL (COREG) 12.5 MG TABLET    Take 1 tablet by mouth 2 times daily (with meals)    DIPHENHYDRAMINE-APAP, SLEEP, (TYLENOL PM EXTRA STRENGTH PO)    Take by mouth as needed    HYDRALAZINE (APRESOLINE) 25 MG TABLET    Take 1 tablet by mouth every 8 hours    LISINOPRIL-HYDROCHLOROTHIAZIDE (PRINZIDE;ZESTORETIC) 10-12.5 MG PER TABLET    lisinopril 10 mg-hydrochlorothiazide 12.5 mg tablet    MELOXICAM (MOBIC) 7.5 MG TABLET    Take 7.5 mg by mouth 2 times daily as needed       ALLERGIES     Codeine and Naproxen    FAMILY HISTORY       Family History   Problem Relation Age of Onset    Diabetes Mother     Heart Disease Father     High Blood Pressure Father           SOCIAL HISTORY       Social History     Socioeconomic History    Marital status:      Spouse name: None    Number of children: None    Years of education: None    Highest education level: None   Occupational History    None   Tobacco Use    Smoking status: Current Every Day Smoker Packs/day: 2.00     Years: 54.00     Pack years: 108.00     Types: Cigarettes    Smokeless tobacco: Never Used   Substance and Sexual Activity    Alcohol use: Not Currently     Comment: occasional    Drug use: No    Sexual activity: None   Other Topics Concern    None   Social History Narrative    None     Social Determinants of Health     Financial Resource Strain:     Difficulty of Paying Living Expenses:    Food Insecurity:     Worried About Running Out of Food in the Last Year:     Ran Out of Food in the Last Year:    Transportation Needs:     Lack of Transportation (Medical):  Lack of Transportation (Non-Medical):    Physical Activity:     Days of Exercise per Week:     Minutes of Exercise per Session:    Stress:     Feeling of Stress :    Social Connections:     Frequency of Communication with Friends and Family:     Frequency of Social Gatherings with Friends and Family:     Attends [employer] Services:     Active Member of Clubs or Organizations:     Attends Club or Organization Meetings:     Marital Status:    Intimate Partner Violence:     Fear of Current or Ex-Partner:     Emotionally Abused:     Physically Abused:     Sexually Abused:        SCREENINGS             PHYSICAL EXAM    (up to 7 for level 4, 8 or more for level 5)     ED Triage Vitals [09/16/21 0131]   BP Temp Temp Source Pulse Resp SpO2 Height Weight   (!) 199/75 97.2 °F (36.2 °C) Oral 96 26 95 % -- 160 lb (72.6 kg)       Physical Exam  Vitals and nursing note reviewed. Constitutional:       Appearance: She is obese. Comments: Patient appears to be in pain. HENT:      Head: Normocephalic and atraumatic. Right Ear: External ear normal.      Left Ear: External ear normal.      Nose: Nose normal.      Mouth/Throat:      Mouth: Mucous membranes are dry. Pharynx: Oropharynx is clear. No oropharyngeal exudate or posterior oropharyngeal erythema. Eyes:      General: No scleral icterus. Conjunctiva/sclera: Conjunctivae normal.      Pupils: Pupils are equal, round, and reactive to light. Cardiovascular:      Rate and Rhythm: Normal rate and regular rhythm. Pulses: Normal pulses. Heart sounds: Normal heart sounds. Pulmonary:      Effort: Pulmonary effort is normal.      Breath sounds: Normal breath sounds. Abdominal:      General: Bowel sounds are normal. There is no distension. Palpations: Abdomen is soft. Tenderness: There is abdominal tenderness (Periumbilical tenderness with guarding. ). There is guarding. There is no rebound. Comments: Midline surgical scar without significant herniation, mild diastases recti. Musculoskeletal:         General: No tenderness or deformity. Cervical back: Neck supple. No rigidity. Right lower leg: Edema (Trace pedal edema bilaterally.) present. Left lower leg: Edema present. Skin:     General: Skin is warm and dry. Capillary Refill: Capillary refill takes less than 2 seconds. Coloration: Skin is not jaundiced. Neurological:      General: No focal deficit present. Mental Status: She is alert and oriented to person, place, and time. Mental status is at baseline. Cranial Nerves: No cranial nerve deficit. Sensory: No sensory deficit. Motor: No weakness. Coordination: Coordination normal.   Psychiatric:         Mood and Affect: Mood normal.         Behavior: Behavior normal.         DIAGNOSTIC RESULTS     EKG: All EKG's areinterpreted by the Emergency Department Physician who either signs or Co-signs this chart in the absence of a cardiologist.    EKG dated 9/16/2021 at 0 2:40 AM: Sinus tachycardia, rate 103. T wave inversions in 1 and aVL.     RADIOLOGY:  Non-plain film images such as CT, Ultrasound and MRI are read by the radiologist. Plain radiographic images are visualized and preliminarily interpreted bythe emergency physician with the below findings:    CT abdomen pelvis without PLASMA   TROPONIN       All other labs were within normal range or not returned as of this dictation. EMERGENCY DEPARTMENT COURSE and DIFFERENTIAL DIAGNOSIS/MDM:   Vitals:    Vitals:    09/16/21 0131   BP: (!) 199/75   Pulse: 96   Resp: 26   Temp: 97.2 °F (36.2 °C)   TempSrc: Oral   SpO2: 95%   Weight: 160 lb (72.6 kg)       MDM  78-year-old female presents with abdominal pain. Lab, EKG, and radiology results reviewed. Patient given IV fluid, pain medication, Zofran, and IV antibiotics while awaiting results of CT. Patient noted to have an elevated white blood cell count with addition of tachycardia and tachypnea indicates that sepsis may be a possible diagnosis. Antibiotics given early due to this possibility. Results of CT scan indicate small bowel obstruction. Patient has multiple abnormalities on lab work and evidence of urinary tract infection. Discussed with Dr. Devonte Salazar, general surgery, who recommends NG tube placement with IV LR. Discussed with Dr. Cam Hutchison, hospitalist, who will admit patient for further evaluation and treatment. PROCEDURES:  Unless otherwise noted below, none     Procedures    FINAL IMPRESSION      1. Small bowel obstruction ([address]    2. Nausea and vomiting, intractability of vomiting not specified, unspecified vomiting type    3. Hyperkalemia    4. Dehydration    5.  Acute urinary tract infection          DISPOSITION/PLAN   DISPOSITION Decision To Admit 09/16/2021 05:18:24 AM           (Please note that portions of this note were completed with a voice recognition program.  Efforts were made to edit thedictations but occasionally words are mis-transcribed.)    Lucille Parsons MD (electronically signed)  Attending Emergency Physician          Lucille Parsons MD  09/16/21 2021

## 2021-09-16 NOTE — CONSULTS
[facility] Surgery[facility]    Patient ID: Opal Heath  66 y.o.  female  YOB: 1950    Admitting Diagnosis: Dehydration [E86.0]  Hyperkalemia [E87.5]  Small bowel obstruction ([address] [K56.609]  Acute urinary tract infection [N39.0]  Partial small bowel obstruction (HCC) [K56.600]  Nausea and vomiting, intractability of vomiting not specified, unspecified vomiting type [R11.2]    Chief Complaint:  Chief Complaint   Patient presents with    Abdominal Pain       Subjective:    Ms. Amanda Reyes is a 66 y.o. female who presented to the ED this morning with abdominal pain. She states she had three BMs prior to her arrival and three following and the pain did not resolve. She notes no vomiting or nausea. She was evaluated in the ED for concerns of SBO on CT scan and general surgery is consulted. She is seen and examined. She notes improvement in her pain without nausea or vomiting. She denies BM or flatus. She is wanting her NGT removed.        Past Medical History:   Diagnosis Date    Anxiety     Arthritis     Back pain of lumbar region with sciatica     Chronic kidney disease     Disease of blood and blood forming organ     Hyperlipidemia     Hypertension     Movement disorder     Other disorders of kidney and ureter in diseases classified elsewhere     Panic attack     PONV (postoperative nausea and vomiting)      Past Surgical History:   Procedure Laterality Date    ABDOMEN SURGERY      APPENDECTOMY      BACK SURGERY      CERVICAL     BACK SURGERY      LUMBAR RUPTURED DISC    CHOLECYSTECTOMY      COLECTOMY      BLOCKAGE    COLONOSCOPY      EYE SURGERY      HYSTERECTOMY      JOINT REPLACEMENT      KNEE ARTHROSCOPY Right     SKIN BIOPSY      TOTAL KNEE ARTHROPLASTY Right 8/26/2016    KNEE TOTAL ARTHROPLASTY performed by Farooq Giron DO at [facility] OR     Current Facility-Administered Medications   Medication Dose Route Frequency Provider Last Rate Last DUZKM    HYDROmorphone HCl PF (DILAUDID) injection 0.5 mg  0.5 mg IntraVENous Q30 Min PRN Karla Mccallum MD   0.5 mg at 09/16/21 0242    sodium chloride flush 0.9 % injection 5-40 mL  5-40 mL IntraVENous 2 times per day Karla Mccallum MD   10 mL at 09/16/21 0957    sodium chloride flush 0.9 % injection 5-40 mL  5-40 mL IntraVENous PRN Karla Mccallum MD        0.9 % sodium chloride infusion  25 mL IntraVENous PRN Karla Mccallum MD        enoxaparin (LOVENOX) injection 40 mg  40 mg SubCUTAneous Daily Karla Mccallum MD   40 mg at 09/16/21 0945    ondansetron (ZOFRAN-ODT) disintegrating tablet 4 mg  4 mg Oral Q8H PRN Karla Mccallum MD        Or    ondansetron (ZOFRAN) injection 4 mg  4 mg IntraVENous Q6H PRN Karla Mccallum MD        acetaminophen (TYLENOL) tablet 650 mg  650 mg Oral Q6H PRN Karla Mccallum MD        Or    acetaminophen (TYLENOL) suppository 650 mg  650 mg Rectal Q6H PRN Karla Mccallum MD        bisacodyl (DULCOLAX) suppository 10 mg  10 mg Rectal Daily PRN Karla Mccallum MD        nicotine (NICODERM CQ) 21 MG/24HR 1 patch  1 patch TransDERmal Daily Karla Mccallum MD   1 patch at 09/16/21 0945    piperacillin-tazobactam (ZOSYN) 3,375 mg in dextrose 5 % 50 mL IVPB extended infusion (mini-bag)  3,375 mg IntraVENous Subhash Rubi MD   Stopped at 09/16/21 1417    lactated ringers infusion   IntraVENous Continuous Karla Mccallum  mL/hr at 09/16/21 0919 New Bag at 09/16/21 0919    hydrALAZINE (APRESOLINE) injection 10 mg  10 mg IntraVENous Q4H PRN Karla Mccallum MD        HYDROmorphone HCl PF (DILAUDID) injection 1 mg  1 mg IntraVENous Q4H PRN Karla Mccallum MD   1 mg at 09/16/21 1244    ipratropium-albuterol (DUONEB) nebulizer solution 1 ampule  1 ampule Inhalation Q4H WA Karla Mccallum MD   1 ampule at 09/16/21 1113    dextrose 50 % IV solution  25 g IntraVENous PRN Sandra Finn MD        insulin regular (HUMULIN R;NOVOLIN R) injection 5 Units  5 Units IntraVENous Once Sandra Finn MD        famotidine (PEPCID) injection 20 mg  20 mg IntraVENous Daily Sandra Finn MD   20 mg at 09/16/21 0956    COVID-19 Ad26 Vaccine ([employer]) injection 0.5 mL  1 Dose IntraMUSCular Prior to discharge Sandra Finn MD         Allergies: Codeine and Naproxen    Family History   Problem Relation Age of Onset    Diabetes Mother     Heart Disease Father     High Blood Pressure Father        Social History     Tobacco Use    Smoking status: Current Every Day Smoker     Packs/day: 2.00     Years: 54.00     Pack years: 108.00     Types: Cigarettes    Smokeless tobacco: Never Used   Substance Use Topics    Alcohol use: Not Currently     Comment: occasional       Review of Systems   Constitutional: Negative for fatigue, fever and unexpected weight change. HENT: Negative for hearing loss, nosebleeds and sore throat. Eyes: Negative for pain, redness and visual disturbance. Respiratory: Negative for cough, chest tightness and shortness of breath. Cardiovascular: Negative for chest pain, palpitations and leg swelling. Gastrointestinal: Positive for abdominal distention, abdominal pain and constipation. Negative for diarrhea, nausea and vomiting. Endocrine: Negative for cold intolerance, heat intolerance and polydipsia. Genitourinary: Negative for difficulty urinating, frequency and urgency. Musculoskeletal: Negative for back pain, joint swelling and neck pain. Skin: Negative for color change, rash and wound. Allergic/Immunologic: Negative for environmental allergies and food allergies. Neurological: Negative for seizures, light-headedness and headaches. Hematological: Negative for adenopathy. Does not bruise/bleed easily. Psychiatric/Behavioral: Negative for confusion, sleep disturbance and suicidal ideas. Objective:   BP (!) 198/76   Pulse 102   Temp 97.3 °F (36.3 °C) (Temporal)   Resp 18   Ht 5' 2\" (1.575 m)   Wt 160 lb (72.6 kg)   LMP  (LMP Unknown)   SpO2 98%   BMI 29.26 kg/m²       Intake/Output Summary (Last 24 hours) at 9/16/2021 1510  Last data filed at 9/16/2021 1500  Gross per 24 hour   Intake 816 ml   Output 1350 ml   Net -534 ml     Physical Exam  Vitals reviewed. Constitutional:       Appearance: She is well-developed. HENT:      Head: Normocephalic and atraumatic. Eyes:      Pupils: Pupils are equal, round, and reactive to light. Cardiovascular:      Rate and Rhythm: Normal rate and regular rhythm. Heart sounds: Normal heart sounds. Pulmonary:      Effort: Pulmonary effort is normal.      Breath sounds: Normal breath sounds. No wheezing or rales. Abdominal:      General: Abdomen is protuberant. Bowel sounds are normal. There is no distension. Palpations: Abdomen is soft. Tenderness: There is abdominal tenderness in the periumbilical area. There is no guarding or rebound. Hernia: No hernia is present. Musculoskeletal:         General: Normal range of motion. Cervical back: Normal range of motion. Lymphadenopathy:      Cervical: No cervical adenopathy. Skin:     General: Skin is warm and dry. Neurological:      Mental Status: She is alert and oriented to person, place, and time. Deep Tendon Reflexes: Reflexes are normal and symmetric. Psychiatric:         Behavior: Behavior normal.         Thought Content:  Thought content normal.         Judgment: Judgment normal.         Data:  CBC:   Recent Labs     09/16/21 0132   WBC 20.7*   RBC 3.88*   HGB 11.2*   HCT 36.6*   MCV 94.3   RDW 15.0*        BMP:   Recent Labs     09/16/21 0132 09/16/21  1138   *  --    K 5.9* 5.5*   CL 98  --    CO2 17*  --    ANIONGAP 14  --    GLUCOSE 231*  --    CREATININE 1.3*  --    LABGLOM 40*  --    CALCIUM 9.6  --      LFT:   Recent Labs 09/16/21  0132   PROT 8.5   LABALBU 5.0   BILITOT <0.2   ALKPHOS 94   ALT 12   AST 12     PT/INR:No results for input(s): PROTIME, INR in the last 72 hours. Ct abd/pelvis       Impression   Proximal and mid small bowel obstruction as detailed   above. An incompletely evaluated low-density lesion in the left lobe of the   liver. Further correlation with sonography or MR imaging of the   abdomen may be obtained. Severe atheromatous changes of the abdominal aorta and iliac arteries. Significant stenosis of the common iliac artery bilaterally. Above study was initially reviewed and reported by [facility]. I do not   find any discrepancies. Signed by Dr Sunil Ruiz       Impression   No finding to suggest small bowel obstruction in this   study. The distal end of the nasogastric tube is in the proximal stomach. The   distal side-port is adjacent and distal to the gastroesophageal   junction. Another 5 cm advancement may be appropriate. Signed by Dr Adelaide Concepcion           Assessment:     Principal Problem:    Partial small bowel obstruction (HCC)  Active Problems:    REHANA (acute kidney injury) ([address]    Acute cystitis without hematuria    Frequent falls    Hypertension    Tobacco abuse    Hyponatremia    Leukocytosis    Dehydration    SIRS (systemic inflammatory response syndrome) (HCC)    Hyperkalemia    CKD (chronic kidney disease)  Resolved Problems:    * No resolved hospital problems. *      Plan:     NPO.  NGT.  IVF. Plan for sbft given axr results today.     Electronically signed by Amy Kelly DO on 4/43/4736 at 3:10 PM

## 2021-09-16 NOTE — H&P
HISTORY AND PHYSICAL             Date: 9/16/2021        Patient Name: Karma Morris     YOB: 1950      Age:  66 y.o. Chief Complaint     Chief Complaint   Patient presents with    Abdominal Pain      Abdominal pain   Nausea and vomiting   Started all of a sudden     History Obtained From   Patient     History of Present Illness   65 yo female being seen in the er due to abdominal pain and nausea, and vomiting, diarrhea, with low grade fever . She has had an extensive past medical history     She was evaluated in er   Noted to have findings consistent with   Small bowel changes and appear to be a small bowel obstruction '    She also has a uti     She also has hyponatremia and hyperkalemia     She is dehydrated     She is alert and oriented otherwise. These sx came on all of a sudden     Nausea, controlled with ngt and she is feeling some better. Past Medical History     Past Medical History:   Diagnosis Date    Anxiety     Arthritis     Back pain of lumbar region with sciatica     Chronic kidney disease     Disease of blood and blood forming organ     Hyperlipidemia     Hypertension     Movement disorder     Other disorders of kidney and ureter in diseases classified elsewhere     Panic attack     PONV (postoperative nausea and vomiting)         Past Surgical History     Past Surgical History:   Procedure Laterality Date    ABDOMEN SURGERY      APPENDECTOMY      BACK SURGERY      CERVICAL     BACK SURGERY      LUMBAR RUPTURED DISC    CHOLECYSTECTOMY      COLECTOMY      BLOCKAGE    COLONOSCOPY      EYE SURGERY      HYSTERECTOMY      JOINT REPLACEMENT      KNEE ARTHROSCOPY Right     SKIN BIOPSY      TOTAL KNEE ARTHROPLASTY Right 8/26/2016    KNEE TOTAL ARTHROPLASTY performed by Ambreen Height, DO at [facility] OR        Medications Prior to Admission     Prior to Admission medications    Medication Sig Start Date End Date Taking?  Authorizing Provider lisinopril-hydroCHLOROthiazide (PRINZIDE;ZESTORETIC) 10-12.5 MG per tablet lisinopril 10 mg-hydrochlorothiazide 12.5 mg tablet   Yes Historical Provider, MD   ALPRAZolam (XANAX) 1 MG tablet Take 1 mg by mouth 4 times daily. Yes Historical Provider, MD   meloxicam (MOBIC) 7.5 MG tablet Take 7.5 mg by mouth 2 times daily as needed 1/11/21  Yes Historical Provider, MD   carvedilol (COREG) 12.5 MG tablet Take 1 tablet by mouth 2 times daily (with meals) 11/8/20  Yes Dennise Fuentes PA-C   hydrALAZINE (APRESOLINE) 25 MG tablet Take 1 tablet by mouth every 8 hours 11/3/20  Yes Imer Coy MD   diphenhydrAMINE-APAP, sleep, (TYLENOL PM EXTRA STRENGTH PO) Take by mouth as needed   Yes Historical Provider, MD        Allergies   Codeine and Naproxen    Social History     Social History     Tobacco History     Smoking Status  Current Every Day Smoker Smoking Frequency  2 packs/day for 54 years (108 pk yrs) Smoking Tobacco Type  Cigarettes    Smokeless Tobacco Use  Never Used          Alcohol History     Alcohol Use Status  Not Currently Comment  occasional          Drug Use     Drug Use Status  No          Sexual Activity     Sexually Active  Not Asked                Family History     Family History   Problem Relation Age of Onset    Diabetes Mother     Heart Disease Father     High Blood Pressure Father        Review of Systems   Review of Systems   Constitutional: Positive for activity change and fatigue. HENT: Negative. Eyes: Negative. Respiratory: Negative. Cardiovascular: Positive for palpitations. Gastrointestinal: Positive for abdominal pain, nausea and vomiting. Endocrine: Negative. Genitourinary: Positive for difficulty urinating. Musculoskeletal: Positive for arthralgias and back pain. Skin: Negative. Neurological: Positive for dizziness. All other systems reviewed and are negative.       Physical Exam   BP (!) 199/75   Pulse 96   Temp 97.2 °F (36.2 °C) (Oral)   Resp 26   Wt 160 lb (72.6 kg)   LMP  (LMP Unknown)   SpO2 95%   BMI 29.26 kg/m²     Physical Exam  Vitals and nursing note reviewed. Constitutional:       Appearance: Normal appearance. She is obese. HENT:      Head: Normocephalic and atraumatic. Nose: Nose normal.      Mouth/Throat:      Mouth: Mucous membranes are moist.   Eyes:      Extraocular Movements: Extraocular movements intact. Conjunctiva/sclera: Conjunctivae normal.   Cardiovascular:      Rate and Rhythm: Regular rhythm. Tachycardia present. Pulses: Normal pulses. Heart sounds: Normal heart sounds. Pulmonary:      Effort: Pulmonary effort is normal.      Breath sounds: Normal breath sounds. Abdominal:      Comments: Hyperactive bowel sounds noted   Mild tenderness to deep palpation   No rebound    Musculoskeletal:         General: Normal range of motion. Cervical back: Normal range of motion. Skin:     General: Skin is warm. Neurological:      General: No focal deficit present. Mental Status: She is alert.          Labs      Recent Results (from the past 24 hour(s))   CBC Auto Differential    Collection Time: 09/16/21  1:32 AM   Result Value Ref Range    WBC 20.7 (H) 4.8 - 10.8 K/uL    RBC 3.88 (L) 4.20 - 5.40 M/uL    Hemoglobin 11.2 (L) 12.0 - 16.0 g/dL    Hematocrit 36.6 (L) 37.0 - 47.0 %    MCV 94.3 81.0 - 99.0 fL    MCH 28.9 27.0 - 31.0 pg    MCHC 30.6 (L) 33.0 - 37.0 g/dL    RDW 15.0 (H) 11.5 - 14.5 %    Platelets 518 021 - 114 K/uL    MPV 9.6 9.4 - 12.3 fL    Neutrophils % 83.0 (H) 50.0 - 65.0 %    Lymphocytes % 12.2 (L) 20.0 - 40.0 %    Monocytes % 2.9 0.0 - 10.0 %    Eosinophils % 0.8 0.0 - 5.0 %    Basophils % 0.5 0.0 - 1.0 %    Neutrophils Absolute 17.1 (H) 1.5 - 7.5 K/uL    Immature Granulocytes # 0.1 K/uL    Lymphocytes Absolute 2.5 1.1 - 4.5 K/uL    Monocytes Absolute 0.60 0.00 - 0.90 K/uL    Eosinophils Absolute 0.20 0.00 - 0.60 K/uL    Basophils Absolute 0.10 0.00 - 0.20 K/uL   Comprehensive Metabolic Panel Collection Time: 09/16/21  1:32 AM   Result Value Ref Range    Sodium 129 (L) 136 - 145 mmol/L    Potassium 5.9 (H) 3.5 - 5.0 mmol/L    Chloride 98 98 - 111 mmol/L    CO2 17 (L) 22 - 29 mmol/L    Anion Gap 14 7 - 19 mmol/L    Glucose 231 (H) 74 - 109 mg/dL    BUN 39 (H) 8 - 23 mg/dL    CREATININE 1.3 (H) 0.5 - 0.9 mg/dL    GFR Non- 40 (A) >60    GFR  49 (L) >59    Calcium 9.6 8.8 - 10.2 mg/dL    Total Protein 8.5 6.6 - 8.7 g/dL    Albumin 5.0 3.5 - 5.2 g/dL    Total Bilirubin <0.2 0.2 - 1.2 mg/dL    Alkaline Phosphatase 94 35 - 104 U/L    ALT 12 5 - 33 U/L    AST 12 5 - 32 U/L   Lipase    Collection Time: 09/16/21  1:32 AM   Result Value Ref Range    Lipase 38 13 - 60 U/L   Lactic Acid, Plasma    Collection Time: 09/16/21  1:32 AM   Result Value Ref Range    Lactic Acid 1.8 0.5 - 1.9 mmol/L   Troponin    Collection Time: 09/16/21  1:32 AM   Result Value Ref Range    Troponin <0.01 0.00 - 0.03 ng/mL   Urinalysis Reflex to Culture    Collection Time: 09/16/21  2:22 AM    Specimen: Urine, clean catch   Result Value Ref Range    Color, UA YELLOW Straw/Yellow    Clarity, UA Clear Clear    Glucose, Ur 100 (A) Negative mg/dL    Bilirubin Urine Negative Negative    Ketones, Urine Negative Negative mg/dL    Specific Gravity, UA 1.018 1.005 - 1.030    Blood, Urine Negative Negative    pH, UA 5.5 5.0 - 8.0    Protein,  (A) Negative mg/dL    Urobilinogen, Urine 0.2 <2.0 E.U./dL    Nitrite, Urine POSITIVE (A) Negative    Leukocyte Esterase, Urine Negative Negative   Microscopic Urinalysis    Collection Time: 09/16/21  2:22 AM   Result Value Ref Range    Bacteria, UA 4+ (A) None Seen /HPF    Crystals, UA NEG (A) None Seen /HPF    Hyaline Casts, UA 2 0 - 8 /HPF    WBC, UA 6 (H) 0 - 5 /HPF    RBC, UA 6 (H) 0 - 4 /HPF    Epithelial Cells, UA 1 0 - 5 /HPF   COVID-19, Rapid    Collection Time: 09/16/21  5:27 AM    Specimen: Nasopharyngeal Swab   Result Value Ref Range    SARS-CoV-2, NAAT Not Detected Not Detected        Imaging/Diagnostics Last 24 Hours   No results found. Assessment      Hospital Problems         Last Modified POA    * (Principal) Partial small bowel obstruction ([facility] [address] 9/16/2021 Yes    REHANA (acute kidney injury) ([facility] 9/16/2021 Yes    Acute cystitis without hematuria 9/16/2021 Yes    Frequent falls 9/16/2021 Yes    Hypertension 9/16/2021 Yes    Tobacco abuse 9/16/2021 Yes    Hyponatremia 9/16/2021 Yes    Leukocytosis 9/16/2021 Yes    Dehydration 9/16/2021 Yes    SIRS (systemic inflammatory response syndrome) ([facility] [address] [facility] 9/16/2021 Yes    Hyperkalemia 9/16/2021 Yes    CKD (chronic kidney disease) 9/16/2021 Yes          Plan   1. Patient is being admitted to hospital with dx of abdominal pain and findings of partial small bowel obstruction, underlying predisposing issues due to multiple surgeries in the past.     2.  Dehydration   Hyponatremia   Will give fluids. 3.  Mild hyperkalemia   Treat with calcium and   Beta agonist     4. Hypertension   Change to iv meds to control  Hydralazine     5.  uti present on admission and cx and start abx and abx to cover small bowel obstruction pathology as well. 6.  General surgery   To assist in management    7.  scd's and gi prophylaxis     8. Tobacco abuse   Copd and bronchodilator therapy    9. Pain control.      Consultations Ordered:  IP CONSULT TO GENERAL SURGERY    Electronically signed by Sandra Finn MD on 9/16/21 at 5:50 AM CDT

## 2021-09-17 VITALS
SYSTOLIC BLOOD PRESSURE: 165 MMHG | OXYGEN SATURATION: 97 % | BODY MASS INDEX: 29.44 KG/M2 | DIASTOLIC BLOOD PRESSURE: 94 MMHG | TEMPERATURE: 97.3 F | RESPIRATION RATE: 20 BRPM | HEIGHT: 62 IN | WEIGHT: 160 LBS | HEART RATE: 111 BPM

## 2021-09-17 LAB
ALBUMIN SERPL-MCNC: 4.6 G/DL (ref 3.5–5.2)
ALP BLD-CCNC: 76 U/L (ref 35–104)
ALT SERPL-CCNC: 11 U/L (ref 5–33)
ANION GAP SERPL CALCULATED.3IONS-SCNC: 18 MMOL/L (ref 7–19)
AST SERPL-CCNC: 12 U/L (ref 5–32)
BILIRUB SERPL-MCNC: 0.3 MG/DL (ref 0.2–1.2)
BUN BLDV-MCNC: 24 MG/DL (ref 8–23)
CALCIUM SERPL-MCNC: 9.9 MG/DL (ref 8.8–10.2)
CHLORIDE BLD-SCNC: 99 MMOL/L (ref 98–111)
CO2: 24 MMOL/L (ref 22–29)
CREAT SERPL-MCNC: 1 MG/DL (ref 0.5–0.9)
GFR AFRICAN AMERICAN: >59
GFR NON-AFRICAN AMERICAN: 54
GLUCOSE BLD-MCNC: 171 MG/DL (ref 74–109)
HCT VFR BLD CALC: 34.2 % (ref 37–47)
HEMOGLOBIN: 10.7 G/DL (ref 12–16)
INR BLD: 0.99 (ref 0.88–1.18)
LACTIC ACID: 1.6 MMOL/L (ref 0.5–1.9)
MCH RBC QN AUTO: 29.2 PG (ref 27–31)
MCHC RBC AUTO-ENTMCNC: 31.3 G/DL (ref 33–37)
MCV RBC AUTO: 93.4 FL (ref 81–99)
PDW BLD-RTO: 15.2 % (ref 11.5–14.5)
PLATELET # BLD: 295 K/UL (ref 130–400)
PMV BLD AUTO: 9.7 FL (ref 9.4–12.3)
POTASSIUM REFLEX MAGNESIUM: 4.3 MMOL/L (ref 3.5–5)
PROTHROMBIN TIME: 13.3 SEC (ref 12–14.6)
RBC # BLD: 3.66 M/UL (ref 4.2–5.4)
SODIUM BLD-SCNC: 141 MMOL/L (ref 136–145)
TOTAL PROTEIN: 7.5 G/DL (ref 6.6–8.7)
WBC # BLD: 9.7 K/UL (ref 4.8–10.8)

## 2021-09-17 PROCEDURE — 36415 COLL VENOUS BLD VENIPUNCTURE: CPT

## 2021-09-17 PROCEDURE — 80053 COMPREHEN METABOLIC PANEL: CPT

## 2021-09-17 PROCEDURE — 85027 COMPLETE CBC AUTOMATED: CPT

## 2021-09-17 PROCEDURE — 6360000002 HC RX W HCPCS: Performed by: INTERNAL MEDICINE

## 2021-09-17 PROCEDURE — 6370000000 HC RX 637 (ALT 250 FOR IP): Performed by: INTERNAL MEDICINE

## 2021-09-17 PROCEDURE — 83605 ASSAY OF LACTIC ACID: CPT

## 2021-09-17 PROCEDURE — 94640 AIRWAY INHALATION TREATMENT: CPT

## 2021-09-17 PROCEDURE — 2500000003 HC RX 250 WO HCPCS: Performed by: INTERNAL MEDICINE

## 2021-09-17 PROCEDURE — 99231 SBSQ HOSP IP/OBS SF/LOW 25: CPT | Performed by: SURGERY

## 2021-09-17 PROCEDURE — 2580000003 HC RX 258: Performed by: INTERNAL MEDICINE

## 2021-09-17 PROCEDURE — 85610 PROTHROMBIN TIME: CPT

## 2021-09-17 RX ORDER — NICOTINE 21 MG/24HR
1 PATCH, TRANSDERMAL 24 HOURS TRANSDERMAL DAILY
Qty: 30 PATCH | Refills: 0 | Status: SHIPPED | OUTPATIENT
Start: 2021-09-18 | End: 2021-10-18

## 2021-09-17 RX ADMIN — PIPERACILLIN AND TAZOBACTAM 3375 MG: 3; .375 INJECTION, POWDER, LYOPHILIZED, FOR SOLUTION INTRAVENOUS at 09:34

## 2021-09-17 RX ADMIN — HYDROMORPHONE HYDROCHLORIDE 0.5 MG: 1 INJECTION, SOLUTION INTRAMUSCULAR; INTRAVENOUS; SUBCUTANEOUS at 09:41

## 2021-09-17 RX ADMIN — IPRATROPIUM BROMIDE AND ALBUTEROL SULFATE 1 AMPULE: .5; 2.5 SOLUTION RESPIRATORY (INHALATION) at 11:45

## 2021-09-17 RX ADMIN — SODIUM CHLORIDE, POTASSIUM CHLORIDE, SODIUM LACTATE AND CALCIUM CHLORIDE: 600; 310; 30; 20 INJECTION, SOLUTION INTRAVENOUS at 09:42

## 2021-09-17 RX ADMIN — SODIUM CHLORIDE, POTASSIUM CHLORIDE, SODIUM LACTATE AND CALCIUM CHLORIDE: 600; 310; 30; 20 INJECTION, SOLUTION INTRAVENOUS at 10:38

## 2021-09-17 RX ADMIN — FAMOTIDINE 20 MG: 10 INJECTION, SOLUTION INTRAVENOUS at 09:34

## 2021-09-17 RX ADMIN — ENOXAPARIN SODIUM 40 MG: 40 INJECTION SUBCUTANEOUS at 09:34

## 2021-09-17 RX ADMIN — HYDROMORPHONE HYDROCHLORIDE 1 MG: 1 INJECTION, SOLUTION INTRAMUSCULAR; INTRAVENOUS; SUBCUTANEOUS at 01:17

## 2021-09-17 RX ADMIN — PIPERACILLIN AND TAZOBACTAM 3375 MG: 3; .375 INJECTION, POWDER, LYOPHILIZED, FOR SOLUTION INTRAVENOUS at 01:18

## 2021-09-17 RX ADMIN — IPRATROPIUM BROMIDE AND ALBUTEROL SULFATE 1 AMPULE: .5; 2.5 SOLUTION RESPIRATORY (INHALATION) at 15:33

## 2021-09-17 RX ADMIN — IPRATROPIUM BROMIDE AND ALBUTEROL SULFATE 1 AMPULE: .5; 2.5 SOLUTION RESPIRATORY (INHALATION) at 07:37

## 2021-09-17 ASSESSMENT — PAIN SCALES - GENERAL
PAINLEVEL_OUTOF10: 7
PAINLEVEL_OUTOF10: 6

## 2021-09-17 NOTE — DISCHARGE SUMMARY
[place], [place], [address]  DEPARTMENT OF HOSPITALIST MEDICINE    DISCHARGE SUMMARY:        Meche Mitchell  :    MRN:  [id number]    Admit date:  2021  Discharge date: 2021      Admitting Physician:  Emeli Cline MD    Advance Directive: Full Code    Consults Made:   IP CONSULT TO GENERAL SURGERY      Primary Care Physician:  Mabel Calixto MD    Discharge Diagnoses:  Principal Problem:    Partial small bowel obstruction ([address]  Active Problems:    REHANA (acute kidney injury) ([address]    Acute cystitis without hematuria    Frequent falls    Hypertension    Tobacco abuse    Hyponatremia    Leukocytosis    Dehydration    SIRS (systemic inflammatory response syndrome) (HCC)    Hyperkalemia    CKD (chronic kidney disease)  Resolved Problems:    * No resolved hospital problems. *          Pertinent Labs:  CBC with DIFF:  Recent Labs     21  0436   WBC 20.7* 9.7   RBC 3.88* 3.66*   HGB 11.2* 10.7*   HCT 36.6* 34.2*   MCV 94.3 93.4   MCH 28.9 29.2   MCHC 30.6* 31.3*   RDW 15.0* 15.2*    295   MPV 9.6 9.7   NEUTOPHILPCT 83.0*  --    LYMPHOPCT 12.2*  --    MONOPCT 2.9  --    EOSRELPCT 0.8  --    BASOPCT 0.5  --    NEUTROABS 17.1*  --    LYMPHSABS 2.5  --    MONOSABS 0.60  --    EOSABS 0.20  --    BASOSABS 0.10  --        CMP/BMP:  Recent Labs     21  1138 21  0436   *  --  141   K 5.9* 5.5* 4.3   CL 98  --  99   CO2 17*  --  24   ANIONGAP 14  --  18   GLUCOSE 231*  --  171*   BUN 39*  --  24*   CREATININE 1.3*  --  1.0*   LABGLOM 40*  --  54*   CALCIUM 9.6  --  9.9   PROT 8.5  --  7.5   LABALBU 5.0  --  4.6   BILITOT <0.2  --  0.3   ALKPHOS 94  --  76   ALT 12  --  11   AST 12  --  12         CRP:  No results for input(s): CRP in the last 72 hours. Sed Rate:  No results for input(s): SEDRATE in the last 72 hours.       HgBA1c:  No components found for: HGBA1C  FLP:    Lab Results   Component Value Date    TRIG 356 2012 HDL 47 08/20/2012    LDLDIRECT 243 08/20/2012     TSH:  No results found for: TSH  Troponin T:   Recent Labs     09/16/21 0132   TROPONINI <0.01     Pro-BNP: No results for input(s): BNP in the last 72 hours. INR:   Recent Labs     09/17/21 0436   INR 0.99     ABGs: No results found for: PHART, PO2ART, PTL9NIU  UA:  Recent Labs     09/16/21 0222   COLORU YELLOW   PHUR 5.5   WBCUA 6*   RBCUA 6*   BACTERIA 4+*   CLARITYU Clear   SPECGRAV 1.018   LEUKOCYTESUR Negative   UROBILINOGEN 0.2   BILIRUBINUR Negative   BLOODU Negative   GLUCOSEU 100*         Culture Results:    No results for input(s): CXSURG in the last 720 hours. Blood Culture Recent:   Recent Labs     09/16/21  0230   BC No Growth to date. Any change in status will be called. Cultures:   No results for input(s): CULTURE in the last 72 hours. Recent Labs     09/16/21 0230 09/16/21  0233   BC No Growth to date. Any change in status will be called. --    BLOODCULT2  --  No Growth to date. Any change in status will be called. No results for input(s): CXSURG in the last 72 hours. No results for input(s): MG, PHOS in the last 72 hours. Recent Labs     09/16/21 0132 09/17/21 0436   AST 12 12   ALT 12 11   BILITOT <0.2 0.3   ALKPHOS 94 76           Significant Diagnostic Studies:   CT ABDOMEN PELVIS WO CONTRAST Additional Contrast? None    Result Date: 9/16/2021  Examination. CT ABDOMEN PELVIS WO CONTRAST 9/16/2021 2:45 AM History: Periumbilical abdominal pain. DLP: 595 mGycm. The CT scan of the abdomen and pelvis is performed without intravenous contrast enhancement. The images are acquired in axial plane and subsequent reconstruction in coronal and sagittal planes. There is no previous study for comparison. The lung bases included in the study are unremarkable. An ill-defined low-density area in the left lobe, segment 4 B of the liver measures 1.8 cm in diameter. This is suboptimally evaluated due to lack of contrast enhancement. Multiple small granulomas in the otherwise normal spleen are seen. A small splenule is seen. The gallbladder is surgically absent. Mild dilatation common bile duct is due to prior cholecystectomy. Unenhanced pancreas appear normal. Moderate lobulation of both adrenal glands are seen. No significant mass. Moderate lobulation of renal contour bilaterally is noted. No radiopaque calculi. No hydronephrosis. The limited visualized ureter are unremarkable. The urinary bladder is poorly distended and may not be optimally evaluated. The uterus is surgically absent. No adnexal masses. The stomach is moderately distended with fluid and gas. There is moderate dilatation of the proximal small bowel, up to 3.2 cm in diameter, and a zone of transition in the right mid abdomen. The distal small bowel is decompressed. Small amount of gas and stool are seen in the colon. The appendix is surgically absent. Severe atheromatous changes of the abdominal aorta and iliac arteries are seen. There are areas of significant stenosis in the common iliac arteries bilaterally. There is no evidence of abdominal or pelvic lymphadenopathy. The images reviewed in bone window show no acute bony abnormality. Chronic degenerative changes of the lumbar spine are seen with a mild levoscoliosis. Proximal and mid small bowel obstruction as detailed above. An incompletely evaluated low-density lesion in the left lobe of the liver. Further correlation with sonography or MR imaging of the abdomen may be obtained. Severe atheromatous changes of the abdominal aorta and iliac arteries. Significant stenosis of the common iliac artery bilaterally. Above study was initially reviewed and reported by stat rads. I do not find any discrepancies. Signed by Dr Brant Strange    Result Date: 9/16/2021  EXAMINATION: FL SMALL BOWEL FOLLOW THROUGH ONLY 9/16/2021 8:23 PM HISTORY: Small bowel obstruction.  Report: A  view the abdomen was obtained, demonstrating a nonspecific bowel gas pattern, no obvious bowel dilation is identified. The tip of the NG tube is at the distal stomach. Gastrografin contrast was instilled through the existing NG tube. Images were obtained immediately and at 15 minutes. Most of the small intestine is opacified on the first image, with no significant dilation or mucosal thickening or mass effect. On the image obtained at 15 minutes, contrast is seen within the right colon to the level of the hepatic flexure. This is compatible with rapid transit of contrast through the GI tract without evidence of mechanical obstruction. No evidence of small bowel obstruction, Gastrografin contrast transits the GI tract rapidly and is seen in the hepatic flexure of the colon by 15 minutes. Signed by Dr Patric Murphy. [place]    XR ABDOMEN (2 VIEWS)    Result Date: 9/16/2021  Examination. XR ABDOMEN (2 VIEWS) 9/16/2021 11:50 AM History: Small bowel obstruction. A frontal projection of the abdomen including the pelvis and the lower chest is obtained. The comparison is made with the previous study dated 11 6/7/2020. A nasogastric tube is seen with the distal side-port adjacent and distal to the gastroesophageal junction. Another 5 cm advancement may be appropriate. Moderate gas and stool are seen in the colon. There is no significant dilatation of small bowel loops. Both kidneys are obscured by the bowel contents. No radiopaque calculi. There is evidence of previous cholecystectomy. No acute bony abnormality. No finding to suggest small bowel obstruction in this study. The distal end of the nasogastric tube is in the proximal stomach. The distal side-port is adjacent and distal to the gastroesophageal junction. Another 5 cm advancement may be appropriate.  Signed by Dr Lyons Ear Course:   Ms. Morena Ramirez, a 34-year-old female, presenting to [facility] ED (09/26/2021), on account of an acute onset of periumbilical abdominal pain. Associated symptom reported included nausea and vomiting. CT abdomen pelvis (09/16/2021), reported the proximal end mid small bowel obstruction. General surgery consulted and patient admitted for further work-up and management. SBO-resolved  · Followed by general surgery  · NG tube was placed initially on admission (09/16/2021)  · KUB (09/16/2021): Impression: No findings to suggest small bowel obstruction in this study. · Small bowel follow-through (09/16/2021), reported no evidence of small bowel obstruction,  · NG tube discontinued (09/17/2021)  · Okay for discharge from general surgery standpoint  · Follow-up outpatient as needed. Hyperkalemia-resolved  · Potassium of 5.9 --> 5.5 on presentation (09/26/2021)  · Potassium of 4.3 (09/17/2021)    Hyponatremia  · Initial sodium of 129 (09/16/2021)  · Sodium of 141 (09/17/2021)    Leukocytosis  · Initial WBC of 20.7 (09/26/2021)  · Patient afebrile  · Piperacillin-tazobactam initially initiated on admission  · Likely reactive  · WBC of 9.7 (09/17/2021  · Follow-up with PCP outpatient for repeat CBC. Physical Exam:  Vital Signs: BP (!) 165/94   Pulse 111   Temp 97.3 °F (36.3 °C) (Temporal)   Resp 16   Ht 5' 2\" (1.575 m)   Wt 160 lb (72.6 kg)   LMP  (LMP Unknown)   SpO2 97%   BMI 29.26 kg/m²   Physical Exam  Vitals and nursing note reviewed. Constitutional:       General: She is not in acute distress. Appearance: Normal appearance. She is obese. She is not ill-appearing, toxic-appearing or diaphoretic. HENT:      Head: Normocephalic and atraumatic. Right Ear: External ear normal.      Left Ear: External ear normal.      Nose: Nose normal. No congestion or rhinorrhea. Mouth/Throat:      Mouth: Mucous membranes are moist.      Pharynx: Oropharynx is clear. No oropharyngeal exudate or posterior oropharyngeal erythema. Eyes:      General: No scleral icterus. Right eye: No discharge.          Left eye: No discharge. Extraocular Movements: Extraocular movements intact. Conjunctiva/sclera: Conjunctivae normal.      Pupils: Pupils are equal, round, and reactive to light. Cardiovascular:      Rate and Rhythm: Normal rate and regular rhythm. Pulses: Normal pulses. Heart sounds: Normal heart sounds. No murmur heard. No friction rub. No gallop. Pulmonary:      Effort: Pulmonary effort is normal. No respiratory distress. Breath sounds: Normal breath sounds. No stridor. No wheezing, rhonchi or rales. Chest:      Chest wall: No tenderness. Abdominal:      General: Bowel sounds are normal. There is no distension. Palpations: Abdomen is soft. Tenderness: There is no abdominal tenderness. There is no guarding or rebound. Musculoskeletal:         General: No swelling, tenderness, deformity or signs of injury. Normal range of motion. Cervical back: Normal range of motion and neck supple. No rigidity or tenderness. No muscular tenderness. Right lower leg: No edema. Left lower leg: No edema. Skin:     General: Skin is warm and dry. Capillary Refill: Capillary refill takes less than 2 seconds. Coloration: Skin is not jaundiced or pale. Findings: No bruising, erythema, lesion or rash. Neurological:      General: No focal deficit present. Mental Status: She is alert and oriented to person, place, and time. Cranial Nerves: No cranial nerve deficit. Sensory: No sensory deficit. Motor: No weakness. Coordination: Coordination normal.   Psychiatric:         Mood and Affect: Mood normal.         Behavior: Behavior normal.         Thought Content:  Thought content normal.         Judgment: Judgment normal.            Discharge Medications:       Lillette Waves   Home Medication Instructions [facility]:[id number]    Printed on:09/17/21 1522   Medication Information                      ALPRAZolam (XANAX) 1 MG tablet  Take 1 mg by mouth 4 times daily.             carvedilol (COREG) 12.5 MG tablet  Take 1 tablet by mouth 2 times daily (with meals)             diphenhydrAMINE-APAP, sleep, (TYLENOL PM EXTRA STRENGTH PO)  Take by mouth as needed             hydrALAZINE (APRESOLINE) 25 MG tablet  Take 1 tablet by mouth every 8 hours             lisinopril-hydroCHLOROthiazide (PRINZIDE;ZESTORETIC) 10-12.5 MG per tablet  Take 1 tablet by mouth daily              nicotine (NICODERM CQ) 21 MG/24HR  Place 1 patch onto the skin daily                 Discharge Instructions: Follow up with Lisa Pringle MD in 7 days. Take medications as directed. Resume activity as tolerated. Diet: ADULT DIET; Full Liquid        DISCHARGE STATUS:    Condition: Fair  Disposition: Patient is medically stable and will be discharged hom3    Extended Emergency Contact Information  Primary Emergency Contact: [facility]  Address: Alisa Garcia [address] [place] [address], [facility] [address] Phone: [phone]  Mobile Phone: [phone]  Relation: Spouse  Secondary Emergency Contact: Axel Coronel [address] Phone: [phone]  Relation: Child       Time Spent on discharge is  34 mins in the examination, evaluation, counseling and review of medications and discharge plan. Electronically signed by   Franci Nelson MD, MPH, MD,   Internal Medicine Hospitalist   9/17/2021 3:22 PM      Thank you Lisa Pringle MD for the opportunity to be involved in this patient's care. If you have any questions or concerns please feel free to contact me at [phone]        EMR Dragon/Transcription disclaimer:   Much of this encounter note is an electronic transcription/translation of spoken language to printed text.  The electronic translation of spoken language may permit erroneous, or at times, nonsensical words or phrases to be inadvertently transcribed; although attempts have made to review the note for such errors, some may still exist.

## 2021-09-17 NOTE — PROGRESS NOTES
Patient given discharge education at bedside. IVs removed. Tele removed.   Electronically signed by Sherry Valdez RN on 9/17/2021 at 5:57 PM

## 2021-09-17 NOTE — PROGRESS NOTES
Physician Progress Note      PATIENTTeressa Hatchet  CSN #:                  [id number]  :                       1950  ADMIT DATE:       2021 1:24 AM  DISCH DATE:  RESPONDING  PROVIDER #:        Rafia Hernandez MD          QUERY TEXT:    Patient admitted with small bowel obstruction. Noted documentation of Acute   Kidney Injury in H/P on 21 and in surgical consult by Dr. Nate Zepeda on   21. In order to support the diagnosis of REHANA, please include additional   clinical indicators in your documentation. Or please document if the   diagnosis of REHANA has been ruled out after further study    The medical record reflects the following:  Risk Factors: PSBO, dehydration, CKD  Clinical Indicators: () crea 1.3, () crea 1.0  Treatment: NS 1000 ml IV bolus, LR 1000 ml IV bolus, LR @ 100 ml/hr, serial   creatinine    Thank you,  Lakhwinder SERNA, CCDS  [phone]    Defined by Kidney Disease Improving Global Outcomes (KDIGO) clinical practice   guideline for acute kidney injury:  -Increase in SCr by greater than or equal to 0.3 mg/dl within 48 hours; or  -Increase or decrease in SCr to greater than or equal to 1.5 times baseline,   which is known or presumed to have occurred within the prior 7 days; or  -Urine volume < 0.5ml/kg/h for 6 hours  Options provided:  -- Acute kidney injury evidenced by, Please document evidence as well as   baseline creatinine, if known. -- Acute kidney injury ruled out after study  -- Other - I will add my own diagnosis  -- Disagree - Not applicable / Not valid  -- Disagree - Clinically unable to determine / Unknown  -- Refer to Clinical Documentation Reviewer    PROVIDER RESPONSE TEXT:    Acute kidney injury was ruled out after study.     Query created by: Davonte Otoole on 2021 2:08 PM      Electronically signed by:  Rafia Hernandez MD 2021 4:21 PM

## 2021-09-17 NOTE — PROGRESS NOTES
[facility] General Surgery Progress Note    Chief Complaint:   Chief Complaint   Patient presents with    Abdominal Pain       SUBJECTIVE:  Ms. Orlando Ackerman is a 66 y.o. female is seen and examined at bedside. She had her NGT removed. She is passing flatus and stool. She is looking forward to getting a sprite. She notes no abdominal pain. OBJECTIVE:  BP (!) 148/109   Pulse 116   Temp 97.3 °F (36.3 °C) (Temporal)   Resp 20   Ht 5' 2\" (1.575 m)   Wt 160 lb (72.6 kg)   LMP  (LMP Unknown)   SpO2 97%   BMI 29.26 kg/m²   CONSTITUTIONAL: Alert, appropriate, no acute distress  SKIN: warm, dry with no rashes or lesions  HEENT: NCAT, Non icteric, PERR. Trachea Midline. CHEST/LUNGS: CTA bilaterally. Normal respiratory effort. CARDIOVASCULAR: RRR, No edema. ABDOMEN: soft, ND, appropriately TTP, +BS  NEUROLOGIC: CN II-XI grossly intact, no motor or sensory deficits   MUSCULOSKELETAL: No clubbing or cyanosis. PSYCHIATRIC:  Normal Mood and Affect. Alert and Oriented. 9/16/2021 SBFT       Impression   No evidence of small bowel obstruction, Gastrografin   contrast transits the GI tract rapidly and is seen in the hepatic   flexure of the colon by 15 minutes. Signed by Dr Facundo Anderson.  Fish         Labs:  CBC:   Recent Labs     09/16/21  0132 09/17/21  0436   WBC 20.7* 9.7   HGB 11.2* 10.7*    295     BMP:    Recent Labs     09/16/21  0132 09/16/21  1138 09/17/21  0436   *  --  141   K 5.9* 5.5* 4.3   CL 98  --  99   CO2 17*  --  24   BUN 39*  --  24*   CREATININE 1.3*  --  1.0*   GLUCOSE 231*  --  171*       ASSESSMENT:  Principal Problem:    Partial small bowel obstruction (HCC)  Active Problems:    REHANA (acute kidney injury) ([address]    Acute cystitis without hematuria    Frequent falls    Hypertension    Tobacco abuse    Hyponatremia    Leukocytosis    Dehydration    SIRS (systemic inflammatory response syndrome) (HCC)    Hyperkalemia    CKD (chronic kidney disease)  Resolved Problems:    * No resolved hospital problems. *      PLAN:  SBo resolved. D/C NGT. Start diet and advance as tolerated. Patient surgically stable for discharge. F/U with PCP. No surgical f/u indicated. Thank you for the courtesy of this consultation.      Amy Kelly DO   Electronically Signed on 9/17/2021 at 10:39 AM

## 2021-09-17 NOTE — PROGRESS NOTES
NG tube removed. Patient tolerated.   Electronically signed by Jesse Stevenson RN on 9/17/2021 at 10:32 AM

## 2021-09-19 LAB
EKG P AXIS: 61 DEGREES
EKG P-R INTERVAL: 162 MS
EKG Q-T INTERVAL: 360 MS
EKG QRS DURATION: 96 MS
EKG QTC CALCULATION (BAZETT): 433 MS
EKG T AXIS: 87 DEGREES

## 2021-09-19 PROCEDURE — 93010 ELECTROCARDIOGRAM REPORT: CPT | Performed by: INTERNAL MEDICINE

## 2021-09-21 LAB
BLOOD CULTURE, ROUTINE: NORMAL
CULTURE, BLOOD 2: NORMAL

## 2021-10-16 PROBLEM — E86.0 DEHYDRATION: Status: RESOLVED | Noted: 2021-09-16 | Resolved: 2021-10-16

## 2022-07-07 ENCOUNTER — APPOINTMENT (OUTPATIENT)
Dept: GENERAL RADIOLOGY | Age: 72
End: 2022-07-07
Payer: MEDICARE

## 2022-07-07 ENCOUNTER — APPOINTMENT (OUTPATIENT)
Dept: CT IMAGING | Age: 72
End: 2022-07-07
Payer: MEDICARE

## 2022-07-07 ENCOUNTER — HOSPITAL ENCOUNTER (EMERGENCY)
Age: 72
Discharge: HOME | End: 2022-07-07
Attending: EMERGENCY MEDICINE
Payer: MEDICARE

## 2022-07-07 VITALS
DIASTOLIC BLOOD PRESSURE: 86 MMHG | SYSTOLIC BLOOD PRESSURE: 172 MMHG | OXYGEN SATURATION: 95 % | TEMPERATURE: 98 F | RESPIRATION RATE: 16 BRPM | HEART RATE: 88 BPM

## 2022-07-07 DIAGNOSIS — N30.00 ACUTE CYSTITIS WITHOUT HEMATURIA: ICD-10-CM

## 2022-07-07 DIAGNOSIS — S09.90XA CLOSED HEAD INJURY, INITIAL ENCOUNTER: Primary | ICD-10-CM

## 2022-07-07 LAB
ALBUMIN SERPL-MCNC: 4.3 G/DL (ref 3.5–5.2)
ALP BLD-CCNC: 72 U/L (ref 35–104)
ALT SERPL-CCNC: 5 U/L (ref 5–33)
ANION GAP SERPL CALCULATED.3IONS-SCNC: 13 MMOL/L (ref 7–19)
AST SERPL-CCNC: 8 U/L (ref 5–32)
BACTERIA: ABNORMAL /HPF
BASOPHILS ABSOLUTE: 0.1 K/UL (ref 0–0.2)
BASOPHILS RELATIVE PERCENT: 0.6 % (ref 0–1)
BILIRUB SERPL-MCNC: <0.2 MG/DL (ref 0.2–1.2)
BILIRUBIN URINE: NEGATIVE
BLOOD, URINE: NEGATIVE
BUN BLDV-MCNC: 26 MG/DL (ref 8–23)
CALCIUM SERPL-MCNC: 9.2 MG/DL (ref 8.8–10.2)
CHLORIDE BLD-SCNC: 105 MMOL/L (ref 98–111)
CLARITY: ABNORMAL
CO2: 20 MMOL/L (ref 22–29)
COLOR: YELLOW
CREAT SERPL-MCNC: 1.3 MG/DL (ref 0.5–0.9)
EOSINOPHILS ABSOLUTE: 0.2 K/UL (ref 0–0.6)
EOSINOPHILS RELATIVE PERCENT: 2.1 % (ref 0–5)
EPITHELIAL CELLS, UA: ABNORMAL /HPF
GFR AFRICAN AMERICAN: 49
GFR NON-AFRICAN AMERICAN: 40
GLUCOSE BLD-MCNC: 131 MG/DL (ref 74–109)
GLUCOSE URINE: NEGATIVE MG/DL
HCT VFR BLD CALC: 31.1 % (ref 37–47)
HEMOGLOBIN: 9.2 G/DL (ref 12–16)
IMMATURE GRANULOCYTES #: 0 K/UL
KETONES, URINE: NEGATIVE MG/DL
LEUKOCYTE ESTERASE, URINE: ABNORMAL
LYMPHOCYTES ABSOLUTE: 2.3 K/UL (ref 1.1–4.5)
LYMPHOCYTES RELATIVE PERCENT: 28.3 % (ref 20–40)
MCH RBC QN AUTO: 27.1 PG (ref 27–31)
MCHC RBC AUTO-ENTMCNC: 29.6 G/DL (ref 33–37)
MCV RBC AUTO: 91.5 FL (ref 81–99)
MONOCYTES ABSOLUTE: 0.5 K/UL (ref 0–0.9)
MONOCYTES RELATIVE PERCENT: 6.7 % (ref 0–10)
NEUTROPHILS ABSOLUTE: 5 K/UL (ref 1.5–7.5)
NEUTROPHILS RELATIVE PERCENT: 61.9 % (ref 50–65)
NITRITE, URINE: POSITIVE
PDW BLD-RTO: 14.5 % (ref 11.5–14.5)
PH UA: 5 (ref 5–8)
PLATELET # BLD: 288 K/UL (ref 130–400)
PMV BLD AUTO: 9.1 FL (ref 9.4–12.3)
POTASSIUM SERPL-SCNC: 4.6 MMOL/L (ref 3.5–5)
PROTEIN UA: NEGATIVE MG/DL
RBC # BLD: 3.4 M/UL (ref 4.2–5.4)
RBC UA: ABNORMAL /HPF (ref 0–2)
SARS-COV-2, NAAT: NOT DETECTED
SODIUM BLD-SCNC: 138 MMOL/L (ref 136–145)
SPECIFIC GRAVITY UA: 1.02 (ref 1–1.03)
TOTAL PROTEIN: 7.3 G/DL (ref 6.6–8.7)
TROPONIN: <0.01 NG/ML (ref 0–0.03)
UROBILINOGEN, URINE: 1 E.U./DL
WBC # BLD: 8 K/UL (ref 4.8–10.8)
WBC UA: ABNORMAL /HPF (ref 0–5)

## 2022-07-07 PROCEDURE — 36415 COLL VENOUS BLD VENIPUNCTURE: CPT

## 2022-07-07 PROCEDURE — 71045 X-RAY EXAM CHEST 1 VIEW: CPT | Performed by: RADIOLOGY

## 2022-07-07 PROCEDURE — 70450 CT HEAD/BRAIN W/O DYE: CPT

## 2022-07-07 PROCEDURE — 6360000002 HC RX W HCPCS: Performed by: EMERGENCY MEDICINE

## 2022-07-07 PROCEDURE — 96374 THER/PROPH/DIAG INJ IV PUSH: CPT

## 2022-07-07 PROCEDURE — 2580000003 HC RX 258: Performed by: EMERGENCY MEDICINE

## 2022-07-07 PROCEDURE — 80053 COMPREHEN METABOLIC PANEL: CPT

## 2022-07-07 PROCEDURE — 85025 COMPLETE CBC W/AUTO DIFF WBC: CPT

## 2022-07-07 PROCEDURE — 70450 CT HEAD/BRAIN W/O DYE: CPT | Performed by: RADIOLOGY

## 2022-07-07 PROCEDURE — 99284 EMERGENCY DEPT VISIT MOD MDM: CPT

## 2022-07-07 PROCEDURE — 84484 ASSAY OF TROPONIN QUANT: CPT

## 2022-07-07 PROCEDURE — 81001 URINALYSIS AUTO W/SCOPE: CPT

## 2022-07-07 PROCEDURE — 87635 SARS-COV-2 COVID-19 AMP PRB: CPT

## 2022-07-07 PROCEDURE — 71045 X-RAY EXAM CHEST 1 VIEW: CPT

## 2022-07-07 RX ORDER — CEPHALEXIN 500 MG/1
500 CAPSULE ORAL 4 TIMES DAILY
Qty: 28 CAPSULE | Refills: 0 | Status: SHIPPED | OUTPATIENT
Start: 2022-07-07 | End: 2022-07-14

## 2022-07-07 RX ORDER — 0.9 % SODIUM CHLORIDE 0.9 %
1000 INTRAVENOUS SOLUTION INTRAVENOUS ONCE
Status: COMPLETED | OUTPATIENT
Start: 2022-07-07 | End: 2022-07-07

## 2022-07-07 RX ORDER — ISOSORBIDE MONONITRATE 30 MG/1
30 TABLET, EXTENDED RELEASE ORAL DAILY
COMMUNITY

## 2022-07-07 RX ADMIN — SODIUM CHLORIDE 1000 ML: 9 INJECTION, SOLUTION INTRAVENOUS at 08:59

## 2022-07-07 RX ADMIN — WATER 1000 MG: 1 INJECTION INTRAMUSCULAR; INTRAVENOUS; SUBCUTANEOUS at 13:05

## 2022-07-07 ASSESSMENT — ENCOUNTER SYMPTOMS
DIARRHEA: 0
NAUSEA: 0
VOMITING: 0
BACK PAIN: 0
SORE THROAT: 0
RHINORRHEA: 0
COUGH: 0
ABDOMINAL PAIN: 0
SHORTNESS OF BREATH: 0

## 2022-07-07 NOTE — ED NOTES
Patient ambulated to restroom with assistance. Patient has very unsteady on feet.      Aleida Herniquez  07/07/22 1123

## 2022-07-07 NOTE — ED NOTES
Pt A&Ox4 sitting on side of the bed. Antibiotic given IV and tolerated well. Pt and  verbalized understanding of d/c instructions. [id number] Pt ambulated independently upon discharge from ER.         Godfrey Carrel, RN  07/07/22 0361

## 2022-07-07 NOTE — ED PROVIDER NOTES
[facility] EMERGENCY DEPT  eMERGENCY dEPARTMENT eNCOUnter      Pt Name: Phillip Moore  MRN: [id number]  [place] 1950  Date of evaluation: 7/7/2022  Provider: Aiyana Mcgee MD    [address] [place] [address]       Chief Complaint   Patient presents with   Lavona Euler Fall     2 weeks ago had a syncopal episode and fell and today \"every muscle in her body aches\", headache, not eating anything, balance unsteady         HISTORY OF PRESENT ILLNESS   (Location/Symptom, Timing/Onset,Context/Setting, Quality, Duration, Modifying Factors, Severity)  Note limiting factors. Phillip Moore is a 66 y.o. female who presents to the emergency department for generalized body aches. Patient tells me 2 weeks ago she was standing on her porch watching the neighbors child play and she woke up laying on the porch and believes she likely had passed out. She had been laying there for approximately 30 minutes when her  found her. She states over the last several days she has had body aches and does complain of intermittent headaches that developed typically around 1 PM and afternoon. Has had decreased appetite been a little unsteady on her feet. She denies any chest pain palpitation shortness of breath. No headache currently or vision changes. No focal weakness or numbness but does state that for the last week and both of her feet felt a little tingly like pins-and-needles. She denies any prior history of diabetes or neuropathy. HPI    NursingNotes were reviewed. REVIEW OF SYSTEMS    (2-9 systems for level 4, 10 or more for level 5)     Review of Systems   Constitutional: Positive for fatigue. Negative for chills and fever. HENT: Negative for rhinorrhea and sore throat. Eyes: Negative for visual disturbance. Respiratory: Negative for cough and shortness of breath. Cardiovascular: Negative for chest pain, palpitations and leg swelling. Gastrointestinal: Negative for abdominal pain, diarrhea, nausea and vomiting.    Genitourinary: Negative for dysuria, frequency and urgency. Musculoskeletal: Positive for myalgias. Negative for back pain and neck pain. Neurological: Positive for numbness and headaches. Negative for dizziness, syncope, facial asymmetry, speech difficulty and weakness. All other systems reviewed and are negative. PAST MEDICALHISTORY     Past Medical History:   Diagnosis Date    Anxiety     Arthritis     Back pain of lumbar region with sciatica     Chronic kidney disease     Disease of blood and blood forming organ     Hyperlipidemia     Hypertension     Movement disorder     Other disorders of kidney and ureter in diseases classified elsewhere     Panic attack     PONV (postoperative nausea and vomiting)          SURGICAL HISTORY       Past Surgical History:   Procedure Laterality Date    ABDOMEN SURGERY      APPENDECTOMY      BACK SURGERY      CERVICAL     BACK SURGERY      LUMBAR RUPTURED DISC    CHOLECYSTECTOMY      COLECTOMY      BLOCKAGE    COLONOSCOPY      EYE SURGERY      HYSTERECTOMY (CERVIX STATUS UNKNOWN)      JOINT REPLACEMENT      KNEE ARTHROSCOPY Right     SKIN BIOPSY      TOTAL KNEE ARTHROPLASTY Right 8/26/2016    KNEE TOTAL ARTHROPLASTY performed by Gary Avilez DO at [address]     Previous Medications    ALPRAZOLAM (XANAX) 1 MG TABLET    Take 1 mg by mouth 4 times daily.     CARVEDILOL (COREG) 12.5 MG TABLET    Take 1 tablet by mouth 2 times daily (with meals)    DIPHENHYDRAMINE-APAP, SLEEP, (TYLENOL PM EXTRA STRENGTH PO)    Take by mouth as needed    HYDRALAZINE (APRESOLINE) 25 MG TABLET    Take 1 tablet by mouth every 8 hours    ISOSORBIDE MONONITRATE (IMDUR) 30 MG EXTENDED RELEASE TABLET    Take 30 mg by mouth daily    LISINOPRIL-HYDROCHLOROTHIAZIDE (PRINZIDE;ZESTORETIC) 10-12.5 MG PER TABLET    Take 1 tablet by mouth daily     NICOTINE (NICODERM CQ) 21 MG/24HR    Place 1 patch onto the skin daily       ALLERGIES     Codeine and Naproxen    FAMILY HISTORY       Family History   Problem Relation Age of Onset    Diabetes Mother     Heart Disease Father     High Blood Pressure Father           SOCIAL HISTORY       Social History     Socioeconomic History    Marital status:      Spouse name: None    Number of children: None    Years of education: None    Highest education level: None   Occupational History    None   Tobacco Use    Smoking status: Current Every Day Smoker     Packs/day: 2.00     Years: 54.00     Pack years: 108.00     Types: Cigarettes    Smokeless tobacco: Never Used   Vaping Use    Vaping Use: Never used   Substance and Sexual Activity    Alcohol use: Not Currently     Comment: occasional    Drug use: No    Sexual activity: None   Other Topics Concern    None   Social History Narrative    None     Social Determinants of Health     Financial Resource Strain:     Difficulty of Paying Living Expenses: Not on file   Food Insecurity:     Worried About Running Out of Food in the Last Year: Not on file    Roge of Food in the Last Year: Not on file   Transportation Needs:     Lack of Transportation (Medical): Not on file    Lack of Transportation (Non-Medical):  Not on file   Physical Activity:     Days of Exercise per Week: Not on file    Minutes of Exercise per Session: Not on file   Stress:     Feeling of Stress : Not on file   Social Connections:     Frequency of Communication with Friends and Family: Not on file    Frequency of Social Gatherings with Friends and Family: Not on file    Attends [employer] Services: Not on file    Active Member of Clubs or Organizations: Not on file    Attends Club or Organization Meetings: Not on file    Marital Status: Not on file   Intimate Partner Violence:     Fear of Current or Ex-Partner: Not on file    Emotionally Abused: Not on file    Physically Abused: Not on file    Sexually Abused: Not on file   Housing Stability:     Unable to Pay for Housing in the Last Year: Not on file    Number of Places Lived in the Last Year: Not on file    Unstable Housing in the Last Year: Not on file       SCREENINGS    [place] Coma Scale  Eye Opening: Spontaneous  Best Verbal Response: Oriented  Best Motor Response: Obeys commands  [place] Coma Scale Score: 15        PHYSICAL EXAM    (up to 7 for level 4, 8 or more for level 5)     ED Triage Vitals   BP Temp Temp src Pulse Resp SpO2 Height Weight   -- -- -- -- -- -- -- --       Physical Exam  Vitals and nursing note reviewed. Constitutional:       General: She is not in acute distress. Appearance: Normal appearance. She is well-developed. She is not ill-appearing or diaphoretic. HENT:      Head: Normocephalic and atraumatic. Right Ear: External ear normal.      Left Ear: External ear normal.      Nose: Nose normal.      Mouth/Throat:      Mouth: Mucous membranes are moist.   Eyes:      General: No visual field deficit. Extraocular Movements: Extraocular movements intact. Conjunctiva/sclera: Conjunctivae normal.      Pupils: Pupils are equal, round, and reactive to light. Neck:      Trachea: No tracheal deviation. Cardiovascular:      Rate and Rhythm: Normal rate and regular rhythm. Pulses: Normal pulses. Heart sounds: Normal heart sounds. No murmur heard. Pulmonary:      Effort: No respiratory distress. Breath sounds: Normal breath sounds. No wheezing or rales. Abdominal:      Palpations: Abdomen is soft. There is no mass. Tenderness: There is no abdominal tenderness. Musculoskeletal:         General: Normal range of motion. Cervical back: Normal range of motion. Right lower leg: No edema. Left lower leg: No edema. Comments: No midline neck or back pain full range of motion of all 4 extremities without pain or deformity   Skin:     General: Skin is warm and dry. Neurological:      General: No focal deficit present.       Mental Status: She is alert and oriented to person, place, and time. GCS: GCS eye subscore is 4. GCS verbal subscore is 5. GCS motor subscore is 6. Cranial Nerves: No dysarthria or facial asymmetry. Sensory: Sensation is intact. Motor: No weakness or abnormal muscle tone. Coordination: Coordination is intact. Coordination normal.      Gait: Gait is intact. Comments: Patient is sensation is intact however both feet reports some mild decrease sensation to light touch         DIAGNOSTIC RESULTS     EKG: All EKG's areinterpreted by the Emergency Department Physician who either signs or Co-signs this chart in the absence of a cardiologist.    NSR on tele    RADIOLOGY:  Non-plain film images such as CT, Ultrasound and MRI are read by the radiologist. Plain radiographic images are visualized and preliminarily interpreted bythe emergency physician with the below findings:        [address]   Final Result   1. Mild chronic microvascular ischemic changes with age-appropriate parenchymal volume loss. 2. No evidence of acute cortical infarction or intracranial hemorrhage. Recommendation: Follow up as clinically indicated. All CT scans at this facility utilize dose modulation, iterative reconstruction, and/or weight based dosing when appropriate to reduce radiation dose to as low as reasonably achievable. Electronically Signed by Nixon Gilliland MD at 07-Jul-2022 11:02:27 AM               XR CHEST PORTABLE   Final Result   Minor scarring in the left lower lung; the lungs are otherwise grossly clear. Recommendation: Follow up as clinically indicated.     Electronically Signed by Nixon Gilliland MD at 07-Jul-2022 10:02:51 AM                       LABS:  Labs Reviewed   CBC WITH AUTO DIFFERENTIAL - Abnormal; Notable for the following components:       Result Value    RBC 3.40 (*)     Hemoglobin 9.2 (*)     Hematocrit 31.1 (*)     MCHC 29.6 (*)     MPV 9.1 (*)     All other components within normal limits COMPREHENSIVE METABOLIC PANEL - Abnormal; Notable for the following components:    CO2 20 (*)     Glucose 131 (*)     BUN 26 (*)     CREATININE 1.3 (*)     GFR Non- 40 (*)     GFR  49 (*)     All other components within normal limits   URINALYSIS WITH REFLEX TO CULTURE - Abnormal; Notable for the following components:    Clarity, UA CLOUDY (*)     Nitrite, Urine POSITIVE (*)     Leukocyte Esterase, Urine SMALL (*)     All other components within normal limits   MICROSCOPIC URINALYSIS - Abnormal; Notable for the following components:    WBC, UA 3-5 (*)     RBC, UA 3-5 (*)     Bacteria, UA 4+ (*)     All other components within normal limits   COVID-19, RAPID   TROPONIN       All other labs were within normal range or not returned as of this dictation. EMERGENCY DEPARTMENT COURSE and DIFFERENTIAL DIAGNOSIS/MDM:   Vitals:    Vitals:    07/07/22 1011 07/07/22 1135 07/07/22 1232 07/07/22 1320   BP: (!) 149/65 (!) 152/68 (!) 160/80 (!) 172/86   Pulse: 71 72  88   Resp: 18 18  16   Temp: 98.8 °F (37.1 °C)   98 °F (36.7 °C)   TempSrc: Oral   Oral   SpO2: 96% 96% 93% 95%       MDM  Number of Diagnoses or Management Options     Amount and/or Complexity of Data Reviewed  Clinical lab tests: ordered and reviewed  Tests in the radiology section of CPT®: ordered and reviewed  Discuss the patient with other providers: yes  Independent visualization of images, tracings, or specimens: yes      Pt well appearing here presents today for body aches gen weakness, ambulatory here, all labs reassuring, ct head neg s/p fall 2 weeks ago, does have UTI which could be causing some of her symptoms, pt stable for outpt treatment, understands return precautions. CONSULTS:  None    PROCEDURES:  Unless otherwise noted below, none     Procedures    FINAL IMPRESSION      1. Closed head injury, initial encounter    2.  Acute cystitis without hematuria          DISPOSITION/PLAN   DISPOSITION Decision To

## 2023-03-13 ENCOUNTER — TRANSCRIBE ORDERS (OUTPATIENT)
Dept: ADMINISTRATIVE | Facility: HOSPITAL | Age: 73
End: 2023-03-13
Payer: MEDICARE

## 2023-03-13 DIAGNOSIS — Z12.31 VISIT FOR SCREENING MAMMOGRAM: Primary | ICD-10-CM

## 2023-08-07 ENCOUNTER — HOSPITAL ENCOUNTER (OUTPATIENT)
Dept: MAMMOGRAPHY | Facility: HOSPITAL | Age: 73
Discharge: HOME | End: 2023-08-07
Payer: MEDICARE

## 2023-10-06 ENCOUNTER — APPOINTMENT (OUTPATIENT)
Dept: CT IMAGING | Age: 73
DRG: 853 | End: 2023-10-06
Payer: MEDICARE

## 2023-10-06 ENCOUNTER — HOSPITAL ENCOUNTER (INPATIENT)
Age: 73
LOS: 16 days | Discharge: ANOTHER HOSPITAL | DRG: 853 | End: 2023-10-22
Attending: EMERGENCY MEDICINE | Admitting: HOSPITALIST
Payer: MEDICARE

## 2023-10-06 DIAGNOSIS — K63.1 BOWEL PERFORATION (HCC): ICD-10-CM

## 2023-10-06 DIAGNOSIS — N30.00 ACUTE CYSTITIS WITHOUT HEMATURIA: ICD-10-CM

## 2023-10-06 DIAGNOSIS — R91.1 PULMONARY NODULE: ICD-10-CM

## 2023-10-06 DIAGNOSIS — K56.609 SMALL BOWEL OBSTRUCTION (HCC): Primary | ICD-10-CM

## 2023-10-06 DIAGNOSIS — N17.9 AKI (ACUTE KIDNEY INJURY) (HCC): ICD-10-CM

## 2023-10-06 PROBLEM — N18.32 STAGE 3B CHRONIC KIDNEY DISEASE (CKD) (HCC): Status: ACTIVE | Noted: 2021-09-16

## 2023-10-06 LAB
25(OH)D3 SERPL-MCNC: 21.8 NG/ML
ALBUMIN SERPL-MCNC: 4.5 G/DL (ref 3.5–5.2)
ALP SERPL-CCNC: 103 U/L (ref 35–104)
ALT SERPL-CCNC: 9 U/L (ref 5–33)
ANION GAP SERPL CALCULATED.3IONS-SCNC: 13 MMOL/L (ref 7–19)
AST SERPL-CCNC: 10 U/L (ref 5–32)
BACTERIA URNS QL MICRO: ABNORMAL /HPF
BASOPHILS # BLD: 0.1 K/UL (ref 0–0.2)
BASOPHILS NFR BLD: 0.5 % (ref 0–1)
BILIRUB SERPL-MCNC: <0.2 MG/DL (ref 0.2–1.2)
BILIRUB UR QL STRIP: NEGATIVE
BUN SERPL-MCNC: 27 MG/DL (ref 8–23)
CALCIUM SERPL-MCNC: 9.7 MG/DL (ref 8.8–10.2)
CHLORIDE SERPL-SCNC: 104 MMOL/L (ref 98–111)
CLARITY UR: CLEAR
CO2 SERPL-SCNC: 17 MMOL/L (ref 22–29)
COLOR UR: YELLOW
CREAT SERPL-MCNC: 1.6 MG/DL (ref 0.5–0.9)
CRYSTALS URNS MICRO: ABNORMAL /HPF
EOSINOPHIL # BLD: 0.2 K/UL (ref 0–0.6)
EOSINOPHIL NFR BLD: 1 % (ref 0–5)
EPI CELLS #/AREA URNS AUTO: 1 /HPF (ref 0–5)
ERYTHROCYTE [DISTWIDTH] IN BLOOD BY AUTOMATED COUNT: 18.3 % (ref 11.5–14.5)
GLUCOSE SERPL-MCNC: 182 MG/DL (ref 74–109)
GLUCOSE UR STRIP.AUTO-MCNC: NEGATIVE MG/DL
HCT VFR BLD AUTO: 33.4 % (ref 37–47)
HGB BLD-MCNC: 9.9 G/DL (ref 12–16)
HGB UR STRIP.AUTO-MCNC: NEGATIVE MG/L
HYALINE CASTS #/AREA URNS AUTO: 1 /HPF (ref 0–8)
IMM GRANULOCYTES # BLD: 0.1 K/UL
KETONES UR STRIP.AUTO-MCNC: NEGATIVE MG/DL
LACTATE BLDV-SCNC: 1.5 MMOL/L (ref 0.5–1.9)
LEUKOCYTE ESTERASE UR QL STRIP.AUTO: ABNORMAL
LIPASE SERPL-CCNC: 37 U/L (ref 13–60)
LYMPHOCYTES # BLD: 1.9 K/UL (ref 1.1–4.5)
LYMPHOCYTES NFR BLD: 10 % (ref 20–40)
MAGNESIUM SERPL-MCNC: 1.8 MG/DL (ref 1.6–2.4)
MCH RBC QN AUTO: 24.6 PG (ref 27–31)
MCHC RBC AUTO-ENTMCNC: 29.6 G/DL (ref 33–37)
MCV RBC AUTO: 83.1 FL (ref 81–99)
MONOCYTES # BLD: 0.6 K/UL (ref 0–0.9)
MONOCYTES NFR BLD: 3.3 % (ref 0–10)
NEUTROPHILS # BLD: 16.2 K/UL (ref 1.5–7.5)
NEUTS SEG NFR BLD: 84.6 % (ref 50–65)
NITRITE UR QL STRIP.AUTO: POSITIVE
PH UR STRIP.AUTO: 5 [PH] (ref 5–8)
PLATELET # BLD AUTO: 320 K/UL (ref 130–400)
PMV BLD AUTO: 9.5 FL (ref 9.4–12.3)
POTASSIUM SERPL-SCNC: 5.5 MMOL/L (ref 3.5–5)
PROT SERPL-MCNC: 8.1 G/DL (ref 6.6–8.7)
PROT UR STRIP.AUTO-MCNC: 30 MG/DL
RBC # BLD AUTO: 4.02 M/UL (ref 4.2–5.4)
RBC #/AREA URNS AUTO: 1 /HPF (ref 0–4)
SODIUM SERPL-SCNC: 134 MMOL/L (ref 136–145)
SP GR UR STRIP.AUTO: 1.04 (ref 1–1.03)
TSH SERPL DL<=0.005 MIU/L-ACNC: 1.91 UIU/ML (ref 0.27–4.2)
UROBILINOGEN UR STRIP.AUTO-MCNC: 0.2 E.U./DL
WBC # BLD AUTO: 19.2 K/UL (ref 4.8–10.8)
WBC #/AREA URNS AUTO: 21 /HPF (ref 0–5)

## 2023-10-06 PROCEDURE — 96375 TX/PRO/DX INJ NEW DRUG ADDON: CPT

## 2023-10-06 PROCEDURE — 1210000000 HC MED SURG R&B

## 2023-10-06 PROCEDURE — 6360000004 HC RX CONTRAST MEDICATION: Performed by: EMERGENCY MEDICINE

## 2023-10-06 PROCEDURE — 87086 URINE CULTURE/COLONY COUNT: CPT

## 2023-10-06 PROCEDURE — 82746 ASSAY OF FOLIC ACID SERUM: CPT

## 2023-10-06 PROCEDURE — 84443 ASSAY THYROID STIM HORMONE: CPT

## 2023-10-06 PROCEDURE — 85025 COMPLETE CBC W/AUTO DIFF WBC: CPT

## 2023-10-06 PROCEDURE — 6370000000 HC RX 637 (ALT 250 FOR IP): Performed by: HOSPITALIST

## 2023-10-06 PROCEDURE — 82306 VITAMIN D 25 HYDROXY: CPT

## 2023-10-06 PROCEDURE — 82728 ASSAY OF FERRITIN: CPT

## 2023-10-06 PROCEDURE — 83735 ASSAY OF MAGNESIUM: CPT

## 2023-10-06 PROCEDURE — 83540 ASSAY OF IRON: CPT

## 2023-10-06 PROCEDURE — 82607 VITAMIN B-12: CPT

## 2023-10-06 PROCEDURE — 99285 EMERGENCY DEPT VISIT HI MDM: CPT

## 2023-10-06 PROCEDURE — 83690 ASSAY OF LIPASE: CPT

## 2023-10-06 PROCEDURE — 36415 COLL VENOUS BLD VENIPUNCTURE: CPT

## 2023-10-06 PROCEDURE — 2580000003 HC RX 258

## 2023-10-06 PROCEDURE — 81001 URINALYSIS AUTO W/SCOPE: CPT

## 2023-10-06 PROCEDURE — 6360000002 HC RX W HCPCS: Performed by: EMERGENCY MEDICINE

## 2023-10-06 PROCEDURE — 80053 COMPREHEN METABOLIC PANEL: CPT

## 2023-10-06 PROCEDURE — 96374 THER/PROPH/DIAG INJ IV PUSH: CPT

## 2023-10-06 PROCEDURE — 83550 IRON BINDING TEST: CPT

## 2023-10-06 PROCEDURE — 94760 N-INVAS EAR/PLS OXIMETRY 1: CPT

## 2023-10-06 PROCEDURE — 2500000003 HC RX 250 WO HCPCS: Performed by: HOSPITALIST

## 2023-10-06 PROCEDURE — 2580000003 HC RX 258: Performed by: EMERGENCY MEDICINE

## 2023-10-06 PROCEDURE — 6360000002 HC RX W HCPCS

## 2023-10-06 PROCEDURE — 93005 ELECTROCARDIOGRAM TRACING: CPT

## 2023-10-06 PROCEDURE — 74177 CT ABD & PELVIS W/CONTRAST: CPT

## 2023-10-06 PROCEDURE — 87186 SC STD MICRODIL/AGAR DIL: CPT

## 2023-10-06 PROCEDURE — 6360000002 HC RX W HCPCS: Performed by: HOSPITALIST

## 2023-10-06 PROCEDURE — 83605 ASSAY OF LACTIC ACID: CPT

## 2023-10-06 PROCEDURE — 2580000003 HC RX 258: Performed by: HOSPITALIST

## 2023-10-06 RX ORDER — FENTANYL CITRATE 50 UG/ML
25 INJECTION, SOLUTION INTRAMUSCULAR; INTRAVENOUS
Status: DISCONTINUED | OUTPATIENT
Start: 2023-10-06 | End: 2023-10-08

## 2023-10-06 RX ORDER — ROSUVASTATIN CALCIUM 10 MG/1
10 TABLET, COATED ORAL DAILY
COMMUNITY
Start: 2023-09-07

## 2023-10-06 RX ORDER — MELOXICAM 7.5 MG/1
TABLET ORAL
COMMUNITY
End: 2023-10-06

## 2023-10-06 RX ORDER — HYDRALAZINE HYDROCHLORIDE 20 MG/ML
5 INJECTION INTRAMUSCULAR; INTRAVENOUS EVERY 4 HOURS PRN
Status: DISCONTINUED | OUTPATIENT
Start: 2023-10-06 | End: 2023-10-16

## 2023-10-06 RX ORDER — TRAZODONE HYDROCHLORIDE 50 MG/1
50 TABLET ORAL DAILY PRN
COMMUNITY
Start: 2023-09-07

## 2023-10-06 RX ORDER — BUMETANIDE 0.25 MG/ML
1 INJECTION INTRAMUSCULAR; INTRAVENOUS ONCE
Status: COMPLETED | OUTPATIENT
Start: 2023-10-06 | End: 2023-10-06

## 2023-10-06 RX ORDER — SODIUM POLYSTYRENE SULFONATE 15 G/60ML
15 SUSPENSION ORAL; RECTAL ONCE
Status: COMPLETED | OUTPATIENT
Start: 2023-10-06 | End: 2023-10-06

## 2023-10-06 RX ORDER — SODIUM CHLORIDE 9 MG/ML
INJECTION, SOLUTION INTRAVENOUS PRN
Status: DISCONTINUED | OUTPATIENT
Start: 2023-10-06 | End: 2023-10-10 | Stop reason: SDUPTHER

## 2023-10-06 RX ORDER — PROMETHAZINE HYDROCHLORIDE 12.5 MG/1
12.5 TABLET ORAL EVERY 6 HOURS PRN
Status: DISCONTINUED | OUTPATIENT
Start: 2023-10-06 | End: 2023-10-22 | Stop reason: HOSPADM

## 2023-10-06 RX ORDER — ENOXAPARIN SODIUM 100 MG/ML
30 INJECTION SUBCUTANEOUS DAILY
Status: DISCONTINUED | OUTPATIENT
Start: 2023-10-07 | End: 2023-10-11 | Stop reason: DRUGHIGH

## 2023-10-06 RX ORDER — HYDROMORPHONE HYDROCHLORIDE 1 MG/ML
1 INJECTION, SOLUTION INTRAMUSCULAR; INTRAVENOUS; SUBCUTANEOUS
Status: DISCONTINUED | OUTPATIENT
Start: 2023-10-06 | End: 2023-10-07

## 2023-10-06 RX ORDER — ACETAMINOPHEN 650 MG/1
650 SUPPOSITORY RECTAL EVERY 6 HOURS PRN
Status: DISCONTINUED | OUTPATIENT
Start: 2023-10-06 | End: 2023-10-22 | Stop reason: HOSPADM

## 2023-10-06 RX ORDER — ONDANSETRON 2 MG/ML
4 INJECTION INTRAMUSCULAR; INTRAVENOUS EVERY 6 HOURS PRN
Status: DISCONTINUED | OUTPATIENT
Start: 2023-10-06 | End: 2023-10-22 | Stop reason: HOSPADM

## 2023-10-06 RX ORDER — ISOSORBIDE MONONITRATE 30 MG/1
30 TABLET, EXTENDED RELEASE ORAL DAILY
Status: DISCONTINUED | OUTPATIENT
Start: 2023-10-07 | End: 2023-10-08

## 2023-10-06 RX ORDER — MIDAZOLAM HYDROCHLORIDE 2 MG/2ML
2 INJECTION, SOLUTION INTRAMUSCULAR; INTRAVENOUS ONCE
Status: COMPLETED | OUTPATIENT
Start: 2023-10-06 | End: 2023-10-06

## 2023-10-06 RX ORDER — BUSPIRONE HYDROCHLORIDE 5 MG/1
5 TABLET ORAL 3 TIMES DAILY PRN
COMMUNITY
Start: 2023-10-03

## 2023-10-06 RX ORDER — HYDRALAZINE HYDROCHLORIDE 25 MG/1
25 TABLET, FILM COATED ORAL EVERY 8 HOURS SCHEDULED
Status: DISCONTINUED | OUTPATIENT
Start: 2023-10-06 | End: 2023-10-22 | Stop reason: HOSPADM

## 2023-10-06 RX ORDER — POLYETHYLENE GLYCOL 3350 17 G/17G
17 POWDER, FOR SOLUTION ORAL DAILY PRN
Status: DISCONTINUED | OUTPATIENT
Start: 2023-10-06 | End: 2023-10-12

## 2023-10-06 RX ORDER — ONDANSETRON 4 MG/1
4 TABLET, ORALLY DISINTEGRATING ORAL EVERY 8 HOURS PRN
Status: DISCONTINUED | OUTPATIENT
Start: 2023-10-06 | End: 2023-10-22 | Stop reason: HOSPADM

## 2023-10-06 RX ORDER — BUPROPION HYDROCHLORIDE 150 MG/1
150 TABLET, EXTENDED RELEASE ORAL 2 TIMES DAILY
COMMUNITY

## 2023-10-06 RX ORDER — SODIUM CHLORIDE 0.9 % (FLUSH) 0.9 %
5-40 SYRINGE (ML) INJECTION PRN
Status: DISCONTINUED | OUTPATIENT
Start: 2023-10-06 | End: 2023-10-11 | Stop reason: SDUPTHER

## 2023-10-06 RX ORDER — FENTANYL CITRATE 50 UG/ML
25 INJECTION, SOLUTION INTRAMUSCULAR; INTRAVENOUS ONCE
Status: COMPLETED | OUTPATIENT
Start: 2023-10-06 | End: 2023-10-06

## 2023-10-06 RX ORDER — METHOCARBAMOL 750 MG/1
750 TABLET, FILM COATED ORAL 3 TIMES DAILY PRN
COMMUNITY
Start: 2023-09-07

## 2023-10-06 RX ORDER — SODIUM CHLORIDE 9 MG/ML
INJECTION, SOLUTION INTRAVENOUS CONTINUOUS
Status: DISCONTINUED | OUTPATIENT
Start: 2023-10-06 | End: 2023-10-06

## 2023-10-06 RX ORDER — CARVEDILOL 12.5 MG/1
12.5 TABLET ORAL 2 TIMES DAILY WITH MEALS
Status: DISCONTINUED | OUTPATIENT
Start: 2023-10-07 | End: 2023-10-07

## 2023-10-06 RX ORDER — HYDROMORPHONE HYDROCHLORIDE 1 MG/ML
0.5 INJECTION, SOLUTION INTRAMUSCULAR; INTRAVENOUS; SUBCUTANEOUS EVERY 4 HOURS PRN
Status: DISCONTINUED | OUTPATIENT
Start: 2023-10-06 | End: 2023-10-06

## 2023-10-06 RX ORDER — ROSUVASTATIN CALCIUM 10 MG/1
10 TABLET, COATED ORAL NIGHTLY
Status: DISCONTINUED | OUTPATIENT
Start: 2023-10-06 | End: 2023-10-06

## 2023-10-06 RX ORDER — BENAZEPRIL HYDROCHLORIDE 40 MG/1
40 TABLET, FILM COATED ORAL DAILY
COMMUNITY
Start: 2023-09-07 | End: 2023-10-06

## 2023-10-06 RX ORDER — ERGOCALCIFEROL 1.25 MG/1
50000 CAPSULE ORAL WEEKLY
Status: DISCONTINUED | OUTPATIENT
Start: 2023-10-07 | End: 2023-10-07

## 2023-10-06 RX ORDER — DICLOFENAC SODIUM 75 MG/1
75 TABLET, DELAYED RELEASE ORAL 2 TIMES DAILY PRN
Status: ON HOLD | COMMUNITY
Start: 2023-09-28 | End: 2023-10-21 | Stop reason: HOSPADM

## 2023-10-06 RX ORDER — 0.9 % SODIUM CHLORIDE 0.9 %
1000 INTRAVENOUS SOLUTION INTRAVENOUS ONCE
Status: COMPLETED | OUTPATIENT
Start: 2023-10-06 | End: 2023-10-06

## 2023-10-06 RX ORDER — PROMETHAZINE HYDROCHLORIDE 12.5 MG/1
12.5 TABLET ORAL 3 TIMES DAILY PRN
COMMUNITY
Start: 2023-07-05

## 2023-10-06 RX ORDER — ACETAMINOPHEN 325 MG/1
650 TABLET ORAL EVERY 6 HOURS PRN
Status: DISCONTINUED | OUTPATIENT
Start: 2023-10-06 | End: 2023-10-10 | Stop reason: SDUPTHER

## 2023-10-06 RX ORDER — SODIUM CHLORIDE 0.9 % (FLUSH) 0.9 %
5-40 SYRINGE (ML) INJECTION EVERY 12 HOURS SCHEDULED
Status: DISCONTINUED | OUTPATIENT
Start: 2023-10-06 | End: 2023-10-11 | Stop reason: SDUPTHER

## 2023-10-06 RX ORDER — BUPROPION HYDROCHLORIDE 150 MG/1
150 TABLET, EXTENDED RELEASE ORAL DAILY
Status: DISCONTINUED | OUTPATIENT
Start: 2023-10-07 | End: 2023-10-07

## 2023-10-06 RX ORDER — DESVENLAFAXINE 100 MG/1
TABLET, EXTENDED RELEASE ORAL
Status: ON HOLD | COMMUNITY
Start: 2023-10-03 | End: 2023-10-21 | Stop reason: HOSPADM

## 2023-10-06 RX ORDER — VENLAFAXINE HYDROCHLORIDE 75 MG/1
75 CAPSULE, EXTENDED RELEASE ORAL
Status: DISCONTINUED | OUTPATIENT
Start: 2023-10-07 | End: 2023-10-07

## 2023-10-06 RX ORDER — ONDANSETRON 2 MG/ML
4 INJECTION INTRAMUSCULAR; INTRAVENOUS ONCE
Status: COMPLETED | OUTPATIENT
Start: 2023-10-06 | End: 2023-10-06

## 2023-10-06 RX ADMIN — BUMETANIDE 1 MG: 0.25 INJECTION INTRAMUSCULAR; INTRAVENOUS at 23:14

## 2023-10-06 RX ADMIN — ONDANSETRON 4 MG: 2 INJECTION INTRAMUSCULAR; INTRAVENOUS at 19:39

## 2023-10-06 RX ADMIN — MIDAZOLAM 2 MG: 1 INJECTION INTRAMUSCULAR; INTRAVENOUS at 21:36

## 2023-10-06 RX ADMIN — SODIUM CHLORIDE 1000 ML: 9 INJECTION, SOLUTION INTRAVENOUS at 19:46

## 2023-10-06 RX ADMIN — SODIUM CHLORIDE 3000 MG: 9 INJECTION, SOLUTION INTRAVENOUS at 23:13

## 2023-10-06 RX ADMIN — HYDROMORPHONE HYDROCHLORIDE 0.5 MG: 1 INJECTION, SOLUTION INTRAMUSCULAR; INTRAVENOUS; SUBCUTANEOUS at 23:12

## 2023-10-06 RX ADMIN — SODIUM POLYSTYRENE SULFONATE 15 G: 15 SUSPENSION ORAL; RECTAL at 23:14

## 2023-10-06 RX ADMIN — FENTANYL CITRATE 25 MCG: 50 INJECTION, SOLUTION INTRAMUSCULAR; INTRAVENOUS at 19:39

## 2023-10-06 RX ADMIN — ROSUVASTATIN CALCIUM 10 MG: 10 TABLET, FILM COATED ORAL at 23:15

## 2023-10-06 RX ADMIN — IOPAMIDOL 70 ML: 755 INJECTION, SOLUTION INTRAVENOUS at 20:04

## 2023-10-06 RX ADMIN — HYDRALAZINE HYDROCHLORIDE 25 MG: 25 TABLET, FILM COATED ORAL at 23:14

## 2023-10-06 RX ADMIN — CEFTRIAXONE 1000 MG: 1 INJECTION, POWDER, FOR SOLUTION INTRAMUSCULAR; INTRAVENOUS at 21:10

## 2023-10-06 RX ADMIN — SODIUM CHLORIDE, PRESERVATIVE FREE 10 ML: 5 INJECTION INTRAVENOUS at 23:14

## 2023-10-06 RX ADMIN — SODIUM CHLORIDE: 9 INJECTION, SOLUTION INTRAVENOUS at 23:11

## 2023-10-06 ASSESSMENT — ENCOUNTER SYMPTOMS
RHINORRHEA: 0
SHORTNESS OF BREATH: 0
DIARRHEA: 0
VOMITING: 1
RESPIRATORY NEGATIVE: 1
NAUSEA: 1
ABDOMINAL PAIN: 1
COUGH: 0
SORE THROAT: 0
BACK PAIN: 0

## 2023-10-06 ASSESSMENT — PAIN SCALES - GENERAL
PAINLEVEL_OUTOF10: 10

## 2023-10-06 ASSESSMENT — PAIN DESCRIPTION - DESCRIPTORS: DESCRIPTORS: ACHING;PRESSURE

## 2023-10-06 ASSESSMENT — PAIN DESCRIPTION - LOCATION
LOCATION: ABDOMEN
LOCATION: ABDOMEN

## 2023-10-06 ASSESSMENT — PAIN - FUNCTIONAL ASSESSMENT: PAIN_FUNCTIONAL_ASSESSMENT: 0-10

## 2023-10-06 ASSESSMENT — PAIN DESCRIPTION - ORIENTATION
ORIENTATION: MID;LOWER
ORIENTATION: MID

## 2023-10-07 ENCOUNTER — APPOINTMENT (OUTPATIENT)
Dept: NUCLEAR MEDICINE | Age: 73
DRG: 853 | End: 2023-10-07
Payer: MEDICARE

## 2023-10-07 ENCOUNTER — APPOINTMENT (OUTPATIENT)
Dept: GENERAL RADIOLOGY | Age: 73
DRG: 853 | End: 2023-10-07
Payer: MEDICARE

## 2023-10-07 PROBLEM — A41.9 SEPSIS (HCC): Status: ACTIVE | Noted: 2023-10-07

## 2023-10-07 LAB
ALBUMIN SERPL-MCNC: 4.7 G/DL (ref 3.5–5.2)
ALP SERPL-CCNC: 121 U/L (ref 35–104)
ALT SERPL-CCNC: 13 U/L (ref 5–33)
ANION GAP SERPL CALCULATED.3IONS-SCNC: 15 MMOL/L (ref 7–19)
ANION GAP SERPL CALCULATED.3IONS-SCNC: 18 MMOL/L (ref 7–19)
ANION GAP SERPL CALCULATED.3IONS-SCNC: 21 MMOL/L (ref 7–19)
APTT PPP: 31.8 SEC (ref 26–36.2)
AST SERPL-CCNC: 18 U/L (ref 5–32)
BASOPHILS # BLD: 0.1 K/UL (ref 0–0.2)
BASOPHILS NFR BLD: 0.4 % (ref 0–1)
BILIRUB SERPL-MCNC: <0.2 MG/DL (ref 0.2–1.2)
BNP BLD-MCNC: 1696 PG/ML (ref 0–124)
BUN SERPL-MCNC: 27 MG/DL (ref 8–23)
BUN SERPL-MCNC: 27 MG/DL (ref 8–23)
BUN SERPL-MCNC: 28 MG/DL (ref 8–23)
CALCIUM SERPL-MCNC: 8.4 MG/DL (ref 8.8–10.2)
CALCIUM SERPL-MCNC: 9.2 MG/DL (ref 8.8–10.2)
CALCIUM SERPL-MCNC: 9.6 MG/DL (ref 8.8–10.2)
CHLORIDE SERPL-SCNC: 100 MMOL/L (ref 98–111)
CHLORIDE SERPL-SCNC: 97 MMOL/L (ref 98–111)
CHLORIDE SERPL-SCNC: 97 MMOL/L (ref 98–111)
CO2 SERPL-SCNC: 15 MMOL/L (ref 22–29)
CO2 SERPL-SCNC: 19 MMOL/L (ref 22–29)
CO2 SERPL-SCNC: 26 MMOL/L (ref 22–29)
CREAT SERPL-MCNC: 1.4 MG/DL (ref 0.5–0.9)
CREAT SERPL-MCNC: 1.5 MG/DL (ref 0.5–0.9)
CREAT SERPL-MCNC: 1.6 MG/DL (ref 0.5–0.9)
CRP SERPL HS-MCNC: 1.72 MG/DL (ref 0–0.5)
D DIMER PPP FEU-MCNC: 0.86 UG/ML FEU (ref 0–0.48)
EKG P AXIS: 44 DEGREES
EKG P AXIS: 59 DEGREES
EKG P-R INTERVAL: 144 MS
EKG P-R INTERVAL: 160 MS
EKG Q-T INTERVAL: 378 MS
EKG Q-T INTERVAL: 398 MS
EKG QRS DURATION: 80 MS
EKG QRS DURATION: 94 MS
EKG QTC CALCULATION (BAZETT): 428 MS
EKG QTC CALCULATION (BAZETT): 455 MS
EKG T AXIS: 9 DEGREES
EKG T AXIS: 95 DEGREES
EOSINOPHIL # BLD: 0 K/UL (ref 0–0.6)
EOSINOPHIL NFR BLD: 0 % (ref 0–5)
ERYTHROCYTE [DISTWIDTH] IN BLOOD BY AUTOMATED COUNT: 18.2 % (ref 11.5–14.5)
FERRITIN SERPL-MCNC: 40.4 NG/ML (ref 13–150)
FOLATE SERPL-MCNC: 10.1 NG/ML (ref 4.8–37.3)
GLUCOSE BLD-MCNC: 160 MG/DL (ref 70–99)
GLUCOSE BLD-MCNC: 166 MG/DL (ref 70–99)
GLUCOSE BLD-MCNC: 191 MG/DL (ref 70–99)
GLUCOSE BLD-MCNC: 227 MG/DL (ref 70–99)
GLUCOSE BLD-MCNC: 412 MG/DL (ref 70–99)
GLUCOSE BLD-MCNC: 429 MG/DL (ref 70–99)
GLUCOSE SERPL-MCNC: 132 MG/DL (ref 74–109)
GLUCOSE SERPL-MCNC: 212 MG/DL (ref 74–109)
GLUCOSE SERPL-MCNC: 363 MG/DL (ref 74–109)
HCT VFR BLD AUTO: 43.1 % (ref 37–47)
HGB BLD-MCNC: 12.5 G/DL (ref 12–16)
IMM GRANULOCYTES # BLD: 0.3 K/UL
INR PPP: 1.2 (ref 0.88–1.18)
IRON SATN MFR SERPL: 8 % (ref 14–50)
IRON SERPL-MCNC: 28 UG/DL (ref 37–145)
LACTATE BLDV-SCNC: 1.4 MMOL/L (ref 0.5–1.9)
LACTATE BLDV-SCNC: 2.1 MMOL/L (ref 0.5–1.9)
LACTATE BLDV-SCNC: 2.9 MMOL/L (ref 0.5–1.9)
LACTATE BLDV-SCNC: 3.5 MMOL/L (ref 0.5–1.9)
LYMPHOCYTES # BLD: 1 K/UL (ref 1.1–4.5)
LYMPHOCYTES NFR BLD: 3.1 % (ref 20–40)
MCH RBC QN AUTO: 24.2 PG (ref 27–31)
MCHC RBC AUTO-ENTMCNC: 29 G/DL (ref 33–37)
MCV RBC AUTO: 83.4 FL (ref 81–99)
MONOCYTES # BLD: 1.2 K/UL (ref 0–0.9)
MONOCYTES NFR BLD: 3.7 % (ref 0–10)
NEUTROPHILS # BLD: 31 K/UL (ref 1.5–7.5)
NEUTS SEG NFR BLD: 92.1 % (ref 50–65)
PERFORMED ON: ABNORMAL
PLATELET # BLD AUTO: 438 K/UL (ref 130–400)
PLATELET SLIDE REVIEW: ABNORMAL
PMV BLD AUTO: 10.4 FL (ref 9.4–12.3)
POTASSIUM SERPL-SCNC: 4.3 MMOL/L (ref 3.5–5)
POTASSIUM SERPL-SCNC: 4.8 MMOL/L (ref 3.5–5)
POTASSIUM SERPL-SCNC: 5.8 MMOL/L (ref 3.5–5)
PROCALCITONIN: 2.63 NG/ML (ref 0–0.09)
PROT SERPL-MCNC: 9.3 G/DL (ref 6.6–8.7)
PROTHROMBIN TIME: 14.9 SEC (ref 12–14.6)
RBC # BLD AUTO: 5.17 M/UL (ref 4.2–5.4)
SODIUM SERPL-SCNC: 130 MMOL/L (ref 136–145)
SODIUM SERPL-SCNC: 137 MMOL/L (ref 136–145)
SODIUM SERPL-SCNC: 141 MMOL/L (ref 136–145)
TIBC SERPL-MCNC: 368 UG/DL (ref 250–400)
TROPONIN T SERPL-MCNC: 0.21 NG/ML (ref 0–0.03)
TROPONIN T SERPL-MCNC: 0.35 NG/ML (ref 0–0.03)
TROPONIN T SERPL-MCNC: 0.42 NG/ML (ref 0–0.03)
TROPONIN T SERPL-MCNC: 0.44 NG/ML (ref 0–0.03)
VIT B12 SERPL-MCNC: 264 PG/ML (ref 211–946)
WBC # BLD AUTO: 33.7 K/UL (ref 4.8–10.8)

## 2023-10-07 PROCEDURE — 6370000000 HC RX 637 (ALT 250 FOR IP): Performed by: HOSPITALIST

## 2023-10-07 PROCEDURE — 85610 PROTHROMBIN TIME: CPT

## 2023-10-07 PROCEDURE — 85379 FIBRIN DEGRADATION QUANT: CPT

## 2023-10-07 PROCEDURE — 93010 ELECTROCARDIOGRAM REPORT: CPT | Performed by: INTERNAL MEDICINE

## 2023-10-07 PROCEDURE — 2140000000 HC CCU INTERMEDIATE R&B

## 2023-10-07 PROCEDURE — 83880 ASSAY OF NATRIURETIC PEPTIDE: CPT

## 2023-10-07 PROCEDURE — 84484 ASSAY OF TROPONIN QUANT: CPT

## 2023-10-07 PROCEDURE — 2500000003 HC RX 250 WO HCPCS: Performed by: HOSPITALIST

## 2023-10-07 PROCEDURE — 2580000003 HC RX 258

## 2023-10-07 PROCEDURE — 85730 THROMBOPLASTIN TIME PARTIAL: CPT

## 2023-10-07 PROCEDURE — 86140 C-REACTIVE PROTEIN: CPT

## 2023-10-07 PROCEDURE — 83605 ASSAY OF LACTIC ACID: CPT

## 2023-10-07 PROCEDURE — 78580 LUNG PERFUSION IMAGING: CPT

## 2023-10-07 PROCEDURE — 2580000003 HC RX 258: Performed by: INTERNAL MEDICINE

## 2023-10-07 PROCEDURE — 6360000004 HC RX CONTRAST MEDICATION

## 2023-10-07 PROCEDURE — 80053 COMPREHEN METABOLIC PANEL: CPT

## 2023-10-07 PROCEDURE — 84145 PROCALCITONIN (PCT): CPT

## 2023-10-07 PROCEDURE — 6360000002 HC RX W HCPCS: Performed by: INTERNAL MEDICINE

## 2023-10-07 PROCEDURE — 3430000000 HC RX DIAGNOSTIC RADIOPHARMACEUTICAL

## 2023-10-07 PROCEDURE — 94760 N-INVAS EAR/PLS OXIMETRY 1: CPT

## 2023-10-07 PROCEDURE — 6360000002 HC RX W HCPCS

## 2023-10-07 PROCEDURE — 2500000003 HC RX 250 WO HCPCS: Performed by: INTERNAL MEDICINE

## 2023-10-07 PROCEDURE — 6360000002 HC RX W HCPCS: Performed by: HOSPITALIST

## 2023-10-07 PROCEDURE — 85025 COMPLETE CBC W/AUTO DIFF WBC: CPT

## 2023-10-07 PROCEDURE — C8929 TTE W OR WO FOL WCON,DOPPLER: HCPCS

## 2023-10-07 PROCEDURE — 82962 GLUCOSE BLOOD TEST: CPT

## 2023-10-07 PROCEDURE — 2580000003 HC RX 258: Performed by: HOSPITALIST

## 2023-10-07 PROCEDURE — 71045 X-RAY EXAM CHEST 1 VIEW: CPT

## 2023-10-07 PROCEDURE — 36415 COLL VENOUS BLD VENIPUNCTURE: CPT

## 2023-10-07 PROCEDURE — A9540 TC99M MAA: HCPCS

## 2023-10-07 PROCEDURE — 99223 1ST HOSP IP/OBS HIGH 75: CPT | Performed by: INTERNAL MEDICINE

## 2023-10-07 PROCEDURE — 87040 BLOOD CULTURE FOR BACTERIA: CPT

## 2023-10-07 PROCEDURE — 87641 MR-STAPH DNA AMP PROBE: CPT

## 2023-10-07 PROCEDURE — 6370000000 HC RX 637 (ALT 250 FOR IP): Performed by: SURGERY

## 2023-10-07 RX ORDER — CARVEDILOL 25 MG/1
25 TABLET ORAL 2 TIMES DAILY WITH MEALS
Status: DISCONTINUED | OUTPATIENT
Start: 2023-10-07 | End: 2023-10-16

## 2023-10-07 RX ORDER — HYDROMORPHONE HYDROCHLORIDE 1 MG/ML
0.5 INJECTION, SOLUTION INTRAMUSCULAR; INTRAVENOUS; SUBCUTANEOUS
Status: DISCONTINUED | OUTPATIENT
Start: 2023-10-07 | End: 2023-10-12

## 2023-10-07 RX ORDER — INSULIN LISPRO 100 [IU]/ML
0-8 INJECTION, SOLUTION INTRAVENOUS; SUBCUTANEOUS
Status: DISCONTINUED | OUTPATIENT
Start: 2023-10-07 | End: 2023-10-12

## 2023-10-07 RX ORDER — INSULIN LISPRO 100 [IU]/ML
0-4 INJECTION, SOLUTION INTRAVENOUS; SUBCUTANEOUS NIGHTLY
Status: DISCONTINUED | OUTPATIENT
Start: 2023-10-07 | End: 2023-10-12

## 2023-10-07 RX ORDER — SODIUM CHLORIDE, SODIUM LACTATE, POTASSIUM CHLORIDE, AND CALCIUM CHLORIDE .6; .31; .03; .02 G/100ML; G/100ML; G/100ML; G/100ML
500 INJECTION, SOLUTION INTRAVENOUS ONCE
Status: COMPLETED | OUTPATIENT
Start: 2023-10-07 | End: 2023-10-07

## 2023-10-07 RX ORDER — SODIUM POLYSTYRENE SULFONATE 15 G/60ML
15 SUSPENSION ORAL; RECTAL ONCE
Status: COMPLETED | OUTPATIENT
Start: 2023-10-07 | End: 2023-10-07

## 2023-10-07 RX ORDER — ERGOCALCIFEROL 1.25 MG/1
50000 CAPSULE ORAL WEEKLY
Status: DISCONTINUED | OUTPATIENT
Start: 2023-10-07 | End: 2023-10-12

## 2023-10-07 RX ORDER — MORPHINE SULFATE 4 MG/ML
4 INJECTION, SOLUTION INTRAMUSCULAR; INTRAVENOUS
Status: DISCONTINUED | OUTPATIENT
Start: 2023-10-07 | End: 2023-10-07

## 2023-10-07 RX ORDER — INSULIN GLARGINE 100 [IU]/ML
5 INJECTION, SOLUTION SUBCUTANEOUS 2 TIMES DAILY
Status: DISCONTINUED | OUTPATIENT
Start: 2023-10-07 | End: 2023-10-07

## 2023-10-07 RX ORDER — BUMETANIDE 0.25 MG/ML
1 INJECTION INTRAMUSCULAR; INTRAVENOUS ONCE
Status: COMPLETED | OUTPATIENT
Start: 2023-10-07 | End: 2023-10-07

## 2023-10-07 RX ORDER — CYANOCOBALAMIN 1000 UG/ML
1000 INJECTION, SOLUTION INTRAMUSCULAR; SUBCUTANEOUS ONCE
Status: COMPLETED | OUTPATIENT
Start: 2023-10-07 | End: 2023-10-07

## 2023-10-07 RX ORDER — INSULIN GLARGINE 100 [IU]/ML
15 INJECTION, SOLUTION SUBCUTANEOUS DAILY
Status: DISCONTINUED | OUTPATIENT
Start: 2023-10-07 | End: 2023-10-07

## 2023-10-07 RX ORDER — DEXTROSE MONOHYDRATE 100 MG/ML
INJECTION, SOLUTION INTRAVENOUS CONTINUOUS PRN
Status: DISCONTINUED | OUTPATIENT
Start: 2023-10-07 | End: 2023-10-22 | Stop reason: HOSPADM

## 2023-10-07 RX ORDER — METOPROLOL TARTRATE 5 MG/5ML
5 INJECTION INTRAVENOUS EVERY 6 HOURS
Status: DISCONTINUED | OUTPATIENT
Start: 2023-10-07 | End: 2023-10-22 | Stop reason: HOSPADM

## 2023-10-07 RX ORDER — INSULIN GLARGINE 100 [IU]/ML
20 INJECTION, SOLUTION SUBCUTANEOUS 2 TIMES DAILY
Status: DISCONTINUED | OUTPATIENT
Start: 2023-10-07 | End: 2023-10-12

## 2023-10-07 RX ORDER — METRONIDAZOLE 500 MG/100ML
500 INJECTION, SOLUTION INTRAVENOUS EVERY 8 HOURS
Status: DISCONTINUED | OUTPATIENT
Start: 2023-10-07 | End: 2023-10-19 | Stop reason: ALTCHOICE

## 2023-10-07 RX ADMIN — SODIUM CHLORIDE, PRESERVATIVE FREE 10 ML: 5 INJECTION INTRAVENOUS at 20:44

## 2023-10-07 RX ADMIN — ONDANSETRON 4 MG: 2 INJECTION INTRAMUSCULAR; INTRAVENOUS at 20:57

## 2023-10-07 RX ADMIN — HYDROMORPHONE HYDROCHLORIDE 0.5 MG: 1 INJECTION, SOLUTION INTRAMUSCULAR; INTRAVENOUS; SUBCUTANEOUS at 20:57

## 2023-10-07 RX ADMIN — SODIUM CHLORIDE, POTASSIUM CHLORIDE, SODIUM LACTATE AND CALCIUM CHLORIDE 500 ML: 600; 310; 30; 20 INJECTION, SOLUTION INTRAVENOUS at 10:32

## 2023-10-07 RX ADMIN — CARVEDILOL 25 MG: 25 TABLET, FILM COATED ORAL at 08:06

## 2023-10-07 RX ADMIN — ONDANSETRON 4 MG: 2 INJECTION INTRAMUSCULAR; INTRAVENOUS at 08:04

## 2023-10-07 RX ADMIN — CARVEDILOL 25 MG: 25 TABLET, FILM COATED ORAL at 18:23

## 2023-10-07 RX ADMIN — VANCOMYCIN HYDROCHLORIDE 1750 MG: 10 INJECTION, POWDER, LYOPHILIZED, FOR SOLUTION INTRAVENOUS at 12:57

## 2023-10-07 RX ADMIN — INSULIN GLARGINE 15 UNITS: 100 INJECTION, SOLUTION SUBCUTANEOUS at 06:19

## 2023-10-07 RX ADMIN — PHENOL 1 SPRAY: 1.5 LIQUID ORAL at 22:46

## 2023-10-07 RX ADMIN — HYDROMORPHONE HYDROCHLORIDE 0.5 MG: 1 INJECTION, SOLUTION INTRAMUSCULAR; INTRAVENOUS; SUBCUTANEOUS at 17:49

## 2023-10-07 RX ADMIN — ONDANSETRON 4 MG: 2 INJECTION INTRAMUSCULAR; INTRAVENOUS at 14:34

## 2023-10-07 RX ADMIN — METOPROLOL TARTRATE 5 MG: 5 INJECTION INTRAVENOUS at 20:44

## 2023-10-07 RX ADMIN — SODIUM BICARBONATE 50 MEQ: 84 INJECTION INTRAVENOUS at 06:17

## 2023-10-07 RX ADMIN — HYDROMORPHONE HYDROCHLORIDE 0.5 MG: 1 INJECTION, SOLUTION INTRAMUSCULAR; INTRAVENOUS; SUBCUTANEOUS at 14:34

## 2023-10-07 RX ADMIN — BUMETANIDE 1 MG: 0.25 INJECTION INTRAMUSCULAR; INTRAVENOUS at 06:18

## 2023-10-07 RX ADMIN — HYDRALAZINE HYDROCHLORIDE 5 MG: 20 INJECTION INTRAMUSCULAR; INTRAVENOUS at 02:31

## 2023-10-07 RX ADMIN — Medication 5 MILLICURIE: at 11:32

## 2023-10-07 RX ADMIN — MORPHINE SULFATE 4 MG: 4 INJECTION, SOLUTION INTRAMUSCULAR; INTRAVENOUS at 08:04

## 2023-10-07 RX ADMIN — SODIUM CHLORIDE, POTASSIUM CHLORIDE, SODIUM LACTATE AND CALCIUM CHLORIDE 500 ML: 600; 310; 30; 20 INJECTION, SOLUTION INTRAVENOUS at 12:53

## 2023-10-07 RX ADMIN — SODIUM BICARBONATE: 84 INJECTION, SOLUTION INTRAVENOUS at 18:26

## 2023-10-07 RX ADMIN — METRONIDAZOLE 500 MG: 5 INJECTION, SOLUTION INTRAVENOUS at 12:58

## 2023-10-07 RX ADMIN — METOPROLOL TARTRATE 5 MG: 5 INJECTION INTRAVENOUS at 13:43

## 2023-10-07 RX ADMIN — SODIUM POLYSTYRENE SULFONATE 15 G: 15 SUSPENSION ORAL; RECTAL at 06:17

## 2023-10-07 RX ADMIN — PERFLUTREN 1.5 ML: 6.52 INJECTION, SUSPENSION INTRAVENOUS at 12:31

## 2023-10-07 RX ADMIN — ERGOCALCIFEROL 50000 UNITS: 1.25 CAPSULE ORAL at 08:06

## 2023-10-07 RX ADMIN — FENTANYL CITRATE 25 MCG: 50 INJECTION, SOLUTION INTRAMUSCULAR; INTRAVENOUS at 00:30

## 2023-10-07 RX ADMIN — IRON SUCROSE 200 MG: 20 INJECTION, SOLUTION INTRAVENOUS at 06:17

## 2023-10-07 RX ADMIN — CEFEPIME 2000 MG: 2 INJECTION, POWDER, FOR SOLUTION INTRAVENOUS at 22:45

## 2023-10-07 RX ADMIN — SODIUM BICARBONATE: 84 INJECTION, SOLUTION INTRAVENOUS at 00:00

## 2023-10-07 RX ADMIN — HYDRALAZINE HYDROCHLORIDE 25 MG: 25 TABLET, FILM COATED ORAL at 06:18

## 2023-10-07 RX ADMIN — ISOSORBIDE MONONITRATE 30 MG: 30 TABLET, EXTENDED RELEASE ORAL at 08:06

## 2023-10-07 RX ADMIN — CEFEPIME 2000 MG: 2 INJECTION, POWDER, FOR SOLUTION INTRAVENOUS at 12:56

## 2023-10-07 RX ADMIN — MORPHINE SULFATE 4 MG: 4 INJECTION, SOLUTION INTRAMUSCULAR; INTRAVENOUS at 02:31

## 2023-10-07 RX ADMIN — CYANOCOBALAMIN 1000 MCG: 1000 INJECTION INTRAMUSCULAR; SUBCUTANEOUS at 06:18

## 2023-10-07 RX ADMIN — HYDROMORPHONE HYDROCHLORIDE 0.5 MG: 1 INJECTION, SOLUTION INTRAMUSCULAR; INTRAVENOUS; SUBCUTANEOUS at 11:51

## 2023-10-07 RX ADMIN — SODIUM CHLORIDE, PRESERVATIVE FREE 10 ML: 5 INJECTION INTRAVENOUS at 08:08

## 2023-10-07 RX ADMIN — HYDRALAZINE HYDROCHLORIDE 25 MG: 25 TABLET, FILM COATED ORAL at 20:44

## 2023-10-07 RX ADMIN — METRONIDAZOLE 500 MG: 5 INJECTION, SOLUTION INTRAVENOUS at 21:04

## 2023-10-07 RX ADMIN — AMPICILLIN SODIUM AND SULBACTAM SODIUM 3000 MG: 2; 1 INJECTION, POWDER, FOR SOLUTION INTRAMUSCULAR; INTRAVENOUS at 06:19

## 2023-10-07 ASSESSMENT — ENCOUNTER SYMPTOMS
DIARRHEA: 0
ABDOMINAL PAIN: 1
EYE DISCHARGE: 0
BLOOD IN STOOL: 0
BACK PAIN: 0
ABDOMINAL DISTENTION: 0
VOMITING: 1
NAUSEA: 1
CONSTIPATION: 0
WHEEZING: 0
SHORTNESS OF BREATH: 0
COUGH: 0

## 2023-10-07 ASSESSMENT — PAIN DESCRIPTION - ORIENTATION
ORIENTATION: RIGHT;LEFT;UPPER;LOWER
ORIENTATION: MID
ORIENTATION: MID
ORIENTATION: ANTERIOR
ORIENTATION: RIGHT

## 2023-10-07 ASSESSMENT — PAIN DESCRIPTION - LOCATION
LOCATION: ABDOMEN
LOCATION: ABDOMEN
LOCATION: THROAT
LOCATION: ABDOMEN

## 2023-10-07 ASSESSMENT — PAIN SCALES - GENERAL
PAINLEVEL_OUTOF10: 8
PAINLEVEL_OUTOF10: 10
PAINLEVEL_OUTOF10: 8
PAINLEVEL_OUTOF10: 10
PAINLEVEL_OUTOF10: 8
PAINLEVEL_OUTOF10: 10
PAINLEVEL_OUTOF10: 2
PAINLEVEL_OUTOF10: 7

## 2023-10-07 ASSESSMENT — PAIN DESCRIPTION - DESCRIPTORS
DESCRIPTORS: ACHING
DESCRIPTORS: TENDER
DESCRIPTORS: ACHING;DISCOMFORT;BURNING
DESCRIPTORS: ACHING;DISCOMFORT
DESCRIPTORS: ACHING
DESCRIPTORS: CRAMPING;BURNING;SHOOTING

## 2023-10-07 ASSESSMENT — PAIN - FUNCTIONAL ASSESSMENT: PAIN_FUNCTIONAL_ASSESSMENT: PREVENTS OR INTERFERES SOME ACTIVE ACTIVITIES AND ADLS

## 2023-10-07 NOTE — CONSULTS
[facility]  Cardiology Consult      Requesting MD:  Dorene Beauchamp MD   Admit Status:         History obtained from:   [] Patient  [] Other (specify):     PROBLEM LIST:    Patient Active Problem List    Diagnosis Date Noted    SBO (small bowel obstruction) ([address]) 10/06/2023    Pulmonary nodule 10/06/2023    Partial small bowel obstruction ([address]) 09/16/2021    Tobacco abuse 09/16/2021    Hyponatremia 09/16/2021    Leukocytosis 09/16/2021    SIRS (systemic inflammatory response syndrome) ([address]) 09/16/2021    Hyperkalemia 09/16/2021    Stage 3b chronic kidney disease (CKD) ([address]) 09/16/2021    REHANA (acute kidney injury) ([address]) 11/01/2020    Acute cystitis without hematuria 11/01/2020    Frequent falls 11/01/2020    Hypertension     Primary osteoarthritis of right knee 08/26/2016     1. Longstanding ongoing tobacco use with COPD. 2.  Hypertension. 3.  Acute abdomen with small bowel obstruction and noted NSTEMI, asymptomatic, EKG changes with inferolateral ST depressions. 4.  REHANA. PRESENTATION: Concepcion Hnasen is a 75y.o. year old female who presents with acute onset of abdominal pain with significant nonbloody emesis episodes with cramping found to have small bowel obstruction on CT. No complaints of chest pain or shortness of breath. Noted to have positive troponins at 0.21-0.42. EKG 10/7/2023 shows dynamic changes in comparison to 10/6/2023 with 1 mm ST depressions in lateral leads. Patient smokes 2 packs/day for over 54 years. REVIEW OF SYSTEMS:  Review of Systems   Constitutional:  Negative for activity change, fatigue and fever. HENT:  Negative for ear pain, hearing loss and tinnitus. Eyes:  Negative for discharge and visual disturbance. Respiratory:  Negative for cough, shortness of breath and wheezing. Cardiovascular:  Negative for chest pain, palpitations and leg swelling. Gastrointestinal:  Positive for abdominal pain, nausea and vomiting.  Negative for

## 2023-10-07 NOTE — CONSULTS
[facility] [address] [facility] General Surgery     Consult Note    Patient ID: Morales Ventura  75 y.o.  female  YOB: 1950    Admitting Diagnosis: Small bowel obstruction ([address]) [K56.609]  SBO (small bowel obstruction) ([address]) [K56.609]  Pulmonary nodule [R91.1]  REHANA (acute kidney injury) ([address]) [N17.9]  Acute cystitis without hematuria [N30.00]      Chief Complaint:  Chief Complaint   Patient presents with    Abdominal Pain     Epigastric, nausea/ vomiting, describes as cramping/burning/stabbing. History of Present Illness (HPI):   Ms. Paty Mims is a 75 y.o. female who presents to [facility] with complaint of generalized abdominal pain which began suddenly y'day evening prior to presentation. In the ED she underwent work-up with CT abdomen pelvis was read as a small bowel obstruction with transition point and thus she was admitted and a surgical consult has been requested. The patient states that she began having multiple nonbloody emesis at the same time as the pain began. She had a normal lunch as well as a normal BM yesterday. Her and her  who is at her bedside offer limited history. Much of it is taken from patient's chart. He does state that she has been taking Dramamine as if it is going out of style for many months now secondary to ongoing nausea she has been having. In the emergency room, she had an NG tube placed which put out 1500 cc of fluid overnight and another 500 this a.m. she says she feels a little bit better than when she came in. She room she demonstrated at the time a white count of 19 lactate of 1.5 as well as elevated creatinine 1 6 and urinalysis positive for nitrates leukocytes and bacteria. She was admitted, placed on IV antibiotics, and since then has demonstrated a rising lactate more than doubling of her white count as well as significant increase in her troponin levels cardiology is being consulted  She has remained afebrile with stable vital signs.   She

## 2023-10-07 NOTE — ED PROVIDER NOTES
[facility] EMERGENCY DEPT  eMERGENCY dEPARTMENT eNCOUnter      Pt Name: Linda Jimenez  MRN: [id number]  [address] [facility] [place] 1950  Date of evaluation: 10/6/2023  Provider: Ronak Arthur MD    [address]       Chief Complaint   Patient presents with    Abdominal Pain     Epigastric, nausea/ vomiting, describes as cramping/burning/stabbing. HISTORY OF PRESENT ILLNESS   (Location/Symptom, Timing/Onset,Context/Setting, Quality, Duration, Modifying Factors, Severity)  Note limiting factors. Linda Jimenez is a 75 y.o. female who presents to the emergency department for acute onset upper abdominal discomfort with multiple episodes of nonbloody nonbilious emesis denies any diarrhea. Onset around 1700 tonight. Described as cramping sharp stabbing discomfort. Had normal bowel movement this morning. Denies flank pain or UTI symptoms. No chest pain or shortness of breath. HPI    NursingNotes were reviewed. REVIEW OF SYSTEMS    (2-9 systems for level 4, 10 or more for level 5)     Review of Systems   Constitutional:  Negative for chills and fever. HENT:  Negative for rhinorrhea and sore throat. Respiratory:  Negative for cough and shortness of breath. Cardiovascular:  Negative for chest pain and leg swelling. Gastrointestinal:  Positive for abdominal pain, nausea and vomiting. Negative for diarrhea. Genitourinary:  Negative for dysuria, frequency and urgency. Musculoskeletal:  Negative for back pain and neck pain. Neurological:  Negative for dizziness and headaches. All other systems reviewed and are negative.            PAST MEDICALHISTORY     Past Medical History:   Diagnosis Date    Anxiety     Arthritis     Back pain of lumbar region with sciatica     Chronic kidney disease     Disease of blood and blood forming organ     Hyperlipidemia     Hypertension     Movement disorder     Other disorders of kidney and ureter in diseases classified elsewhere     Panic attack     PONV (postoperative nausea

## 2023-10-07 NOTE — H&P
[facility]      Hospitalist - History & Physical      PCP: Sherley El MD    Date of Admission: 10/6/2023    Date of Service: 10/6/2023    Chief Complaint: Abdominal pain    History Of Present Illness: The patient is a 75 y.o. female with hyperlipidemia, HTN, anxiety, complaining of abdominal pain. Patient states that she began having sharp generalized abdominal pain today. She began having multiple nonbloody emesis and presented for further evaluation. Did have normal BM this morning. She denies fever, chills, shortness of breath or chest pain. Denies urinary symptoms. Work-up in ER CT abdomen pelvis small bowel obstruction with transition point right lower quadrant, lung nodules measuring up to 4 mm in size, WBC 19.2, K5.5, lactic acid 1.5, creatinine 1.6 BUN 27, urinalysis nitrite positive, small leukocyte, + bacteria, WBC 21. Has had 2 emesis episodes while in ER. NG tube placed, patient tolerated well. Patient is to be admitted to the hospitalist service with consultation to general surgery.   Past Medical History:        Diagnosis Date    Anxiety     Arthritis     Back pain of lumbar region with sciatica     Chronic kidney disease     Disease of blood and blood forming organ     Hyperlipidemia     Hypertension     Movement disorder     Other disorders of kidney and ureter in diseases classified elsewhere     Panic attack     PONV (postoperative nausea and vomiting)        Past Surgical History:        Procedure Laterality Date    ABDOMEN SURGERY      APPENDECTOMY      BACK SURGERY      CERVICAL     BACK SURGERY      LUMBAR RUPTURED [address] (CERVIX STATUS UNKNOWN)      JOINT REPLACEMENT      KNEE ARTHROSCOPY Right     SKIN BIOPSY      TOTAL KNEE ARTHROPLASTY Right 8/26/2016    KNEE TOTAL ARTHROPLASTY performed by Delvin Franklin DO at [address] Medications:  Prior to Admission

## 2023-10-08 LAB
ALBUMIN SERPL-MCNC: 3.4 G/DL (ref 3.5–5.2)
ALP SERPL-CCNC: 72 U/L (ref 35–104)
ALT SERPL-CCNC: 7 U/L (ref 5–33)
ANION GAP SERPL CALCULATED.3IONS-SCNC: 13 MMOL/L (ref 7–19)
AST SERPL-CCNC: 18 U/L (ref 5–32)
BACTERIA UR CULT: ABNORMAL
BACTERIA UR CULT: ABNORMAL
BASOPHILS # BLD: 0 K/UL (ref 0–0.2)
BASOPHILS NFR BLD: 0.3 % (ref 0–1)
BILIRUB SERPL-MCNC: <0.2 MG/DL (ref 0.2–1.2)
BUN SERPL-MCNC: 25 MG/DL (ref 8–23)
CALCIUM SERPL-MCNC: 8.1 MG/DL (ref 8.8–10.2)
CHLORIDE SERPL-SCNC: 97 MMOL/L (ref 98–111)
CO2 SERPL-SCNC: 30 MMOL/L (ref 22–29)
CREAT SERPL-MCNC: 1.4 MG/DL (ref 0.5–0.9)
EOSINOPHIL # BLD: 0.2 K/UL (ref 0–0.6)
EOSINOPHIL NFR BLD: 1.8 % (ref 0–5)
ERYTHROCYTE [DISTWIDTH] IN BLOOD BY AUTOMATED COUNT: 17.9 % (ref 11.5–14.5)
GLUCOSE BLD-MCNC: 120 MG/DL (ref 70–99)
GLUCOSE BLD-MCNC: 121 MG/DL (ref 70–99)
GLUCOSE BLD-MCNC: 123 MG/DL (ref 70–99)
GLUCOSE BLD-MCNC: 126 MG/DL (ref 70–99)
GLUCOSE SERPL-MCNC: 106 MG/DL (ref 74–109)
HCT VFR BLD AUTO: 25.9 % (ref 37–47)
HCT VFR BLD AUTO: 28.4 % (ref 37–47)
HGB BLD-MCNC: 7.7 G/DL (ref 12–16)
HGB BLD-MCNC: 8.5 G/DL (ref 12–16)
IMM GRANULOCYTES # BLD: 0 K/UL
LYMPHOCYTES # BLD: 1.6 K/UL (ref 1.1–4.5)
LYMPHOCYTES NFR BLD: 14.3 % (ref 20–40)
MCH RBC QN AUTO: 24.3 PG (ref 27–31)
MCHC RBC AUTO-ENTMCNC: 29.9 G/DL (ref 33–37)
MCV RBC AUTO: 81.1 FL (ref 81–99)
MONOCYTES # BLD: 0.6 K/UL (ref 0–0.9)
MONOCYTES NFR BLD: 5.3 % (ref 0–10)
MRSA DNA SPEC QL NAA+PROBE: NOT DETECTED
NEUTROPHILS # BLD: 8.5 K/UL (ref 1.5–7.5)
NEUTS SEG NFR BLD: 78 % (ref 50–65)
ORGANISM: ABNORMAL
PERFORMED ON: ABNORMAL
PLATELET # BLD AUTO: 268 K/UL (ref 130–400)
PMV BLD AUTO: 9.9 FL (ref 9.4–12.3)
POTASSIUM SERPL-SCNC: 3.9 MMOL/L (ref 3.5–5)
PROT SERPL-MCNC: 6.3 G/DL (ref 6.6–8.7)
RBC # BLD AUTO: 3.5 M/UL (ref 4.2–5.4)
SODIUM SERPL-SCNC: 140 MMOL/L (ref 136–145)
TROPONIN T SERPL-MCNC: 0.28 NG/ML (ref 0–0.03)
WBC # BLD AUTO: 10.9 K/UL (ref 4.8–10.8)

## 2023-10-08 PROCEDURE — 99221 1ST HOSP IP/OBS SF/LOW 40: CPT | Performed by: SURGERY

## 2023-10-08 PROCEDURE — 6360000002 HC RX W HCPCS

## 2023-10-08 PROCEDURE — 6370000000 HC RX 637 (ALT 250 FOR IP): Performed by: HOSPITALIST

## 2023-10-08 PROCEDURE — 2580000003 HC RX 258: Performed by: INTERNAL MEDICINE

## 2023-10-08 PROCEDURE — 99232 SBSQ HOSP IP/OBS MODERATE 35: CPT | Performed by: INTERNAL MEDICINE

## 2023-10-08 PROCEDURE — 2140000000 HC CCU INTERMEDIATE R&B

## 2023-10-08 PROCEDURE — 2580000003 HC RX 258

## 2023-10-08 PROCEDURE — 85018 HEMOGLOBIN: CPT

## 2023-10-08 PROCEDURE — 85014 HEMATOCRIT: CPT

## 2023-10-08 PROCEDURE — 82962 GLUCOSE BLOOD TEST: CPT

## 2023-10-08 PROCEDURE — 2500000003 HC RX 250 WO HCPCS: Performed by: INTERNAL MEDICINE

## 2023-10-08 PROCEDURE — 36415 COLL VENOUS BLD VENIPUNCTURE: CPT

## 2023-10-08 PROCEDURE — 94760 N-INVAS EAR/PLS OXIMETRY 1: CPT

## 2023-10-08 PROCEDURE — 2580000003 HC RX 258: Performed by: HOSPITALIST

## 2023-10-08 PROCEDURE — 80053 COMPREHEN METABOLIC PANEL: CPT

## 2023-10-08 PROCEDURE — 6370000000 HC RX 637 (ALT 250 FOR IP): Performed by: INTERNAL MEDICINE

## 2023-10-08 PROCEDURE — 85025 COMPLETE CBC W/AUTO DIFF WBC: CPT

## 2023-10-08 PROCEDURE — 84484 ASSAY OF TROPONIN QUANT: CPT

## 2023-10-08 PROCEDURE — 6360000002 HC RX W HCPCS: Performed by: INTERNAL MEDICINE

## 2023-10-08 PROCEDURE — 2500000003 HC RX 250 WO HCPCS: Performed by: HOSPITALIST

## 2023-10-08 RX ORDER — ISOSORBIDE MONONITRATE 30 MG/1
30 TABLET, EXTENDED RELEASE ORAL 2 TIMES DAILY
Status: DISCONTINUED | OUTPATIENT
Start: 2023-10-08 | End: 2023-10-22 | Stop reason: HOSPADM

## 2023-10-08 RX ORDER — SODIUM CHLORIDE, SODIUM LACTATE, POTASSIUM CHLORIDE, CALCIUM CHLORIDE 600; 310; 30; 20 MG/100ML; MG/100ML; MG/100ML; MG/100ML
INJECTION, SOLUTION INTRAVENOUS CONTINUOUS
Status: DISCONTINUED | OUTPATIENT
Start: 2023-10-08 | End: 2023-10-12

## 2023-10-08 RX ADMIN — ONDANSETRON 4 MG: 2 INJECTION INTRAMUSCULAR; INTRAVENOUS at 02:59

## 2023-10-08 RX ADMIN — ENOXAPARIN SODIUM 30 MG: 100 INJECTION SUBCUTANEOUS at 09:09

## 2023-10-08 RX ADMIN — HYDRALAZINE HYDROCHLORIDE 25 MG: 25 TABLET, FILM COATED ORAL at 21:15

## 2023-10-08 RX ADMIN — HYDROMORPHONE HYDROCHLORIDE 0.5 MG: 1 INJECTION, SOLUTION INTRAMUSCULAR; INTRAVENOUS; SUBCUTANEOUS at 21:44

## 2023-10-08 RX ADMIN — HYDROMORPHONE HYDROCHLORIDE 0.5 MG: 1 INJECTION, SOLUTION INTRAMUSCULAR; INTRAVENOUS; SUBCUTANEOUS at 17:41

## 2023-10-08 RX ADMIN — METRONIDAZOLE 500 MG: 5 INJECTION, SOLUTION INTRAVENOUS at 03:50

## 2023-10-08 RX ADMIN — METOPROLOL TARTRATE 5 MG: 5 INJECTION INTRAVENOUS at 14:17

## 2023-10-08 RX ADMIN — HYDRALAZINE HYDROCHLORIDE 25 MG: 25 TABLET, FILM COATED ORAL at 14:41

## 2023-10-08 RX ADMIN — ONDANSETRON 4 MG: 2 INJECTION INTRAMUSCULAR; INTRAVENOUS at 09:09

## 2023-10-08 RX ADMIN — CARVEDILOL 25 MG: 25 TABLET, FILM COATED ORAL at 17:41

## 2023-10-08 RX ADMIN — WATER 1000 MG: 1 INJECTION INTRAMUSCULAR; INTRAVENOUS; SUBCUTANEOUS at 14:35

## 2023-10-08 RX ADMIN — ISOSORBIDE MONONITRATE 30 MG: 30 TABLET, EXTENDED RELEASE ORAL at 21:15

## 2023-10-08 RX ADMIN — ONDANSETRON 4 MG: 2 INJECTION INTRAMUSCULAR; INTRAVENOUS at 21:13

## 2023-10-08 RX ADMIN — SODIUM BICARBONATE: 84 INJECTION, SOLUTION INTRAVENOUS at 05:24

## 2023-10-08 RX ADMIN — METRONIDAZOLE 500 MG: 5 INJECTION, SOLUTION INTRAVENOUS at 11:59

## 2023-10-08 RX ADMIN — HYDROMORPHONE HYDROCHLORIDE 0.5 MG: 1 INJECTION, SOLUTION INTRAMUSCULAR; INTRAVENOUS; SUBCUTANEOUS at 15:11

## 2023-10-08 RX ADMIN — IRON SUCROSE 200 MG: 20 INJECTION, SOLUTION INTRAVENOUS at 14:20

## 2023-10-08 RX ADMIN — ONDANSETRON 4 MG: 2 INJECTION INTRAMUSCULAR; INTRAVENOUS at 15:11

## 2023-10-08 RX ADMIN — HYDROMORPHONE HYDROCHLORIDE 0.5 MG: 1 INJECTION, SOLUTION INTRAMUSCULAR; INTRAVENOUS; SUBCUTANEOUS at 09:09

## 2023-10-08 RX ADMIN — METRONIDAZOLE 500 MG: 5 INJECTION, SOLUTION INTRAVENOUS at 19:56

## 2023-10-08 RX ADMIN — ISOSORBIDE MONONITRATE 30 MG: 30 TABLET, EXTENDED RELEASE ORAL at 09:08

## 2023-10-08 RX ADMIN — CEFEPIME 2000 MG: 2 INJECTION, POWDER, FOR SOLUTION INTRAVENOUS at 11:55

## 2023-10-08 RX ADMIN — HYDROMORPHONE HYDROCHLORIDE 0.5 MG: 1 INJECTION, SOLUTION INTRAMUSCULAR; INTRAVENOUS; SUBCUTANEOUS at 01:26

## 2023-10-08 RX ADMIN — HYDRALAZINE HYDROCHLORIDE 25 MG: 25 TABLET, FILM COATED ORAL at 05:04

## 2023-10-08 RX ADMIN — METOPROLOL TARTRATE 5 MG: 5 INJECTION INTRAVENOUS at 09:08

## 2023-10-08 RX ADMIN — METOPROLOL TARTRATE 5 MG: 5 INJECTION INTRAVENOUS at 21:15

## 2023-10-08 RX ADMIN — CARVEDILOL 25 MG: 25 TABLET, FILM COATED ORAL at 09:08

## 2023-10-08 RX ADMIN — SODIUM CHLORIDE, POTASSIUM CHLORIDE, SODIUM LACTATE AND CALCIUM CHLORIDE: 600; 310; 30; 20 INJECTION, SOLUTION INTRAVENOUS at 09:02

## 2023-10-08 ASSESSMENT — PAIN - FUNCTIONAL ASSESSMENT: PAIN_FUNCTIONAL_ASSESSMENT: PREVENTS OR INTERFERES SOME ACTIVE ACTIVITIES AND ADLS

## 2023-10-08 ASSESSMENT — PAIN SCALES - GENERAL
PAINLEVEL_OUTOF10: 8
PAINLEVEL_OUTOF10: 8
PAINLEVEL_OUTOF10: 7
PAINLEVEL_OUTOF10: 8
PAINLEVEL_OUTOF10: 8

## 2023-10-08 ASSESSMENT — PAIN DESCRIPTION - ORIENTATION
ORIENTATION: ANTERIOR
ORIENTATION: MID

## 2023-10-08 ASSESSMENT — PAIN DESCRIPTION - LOCATION
LOCATION: ABDOMEN
LOCATION: THROAT
LOCATION: ABDOMEN

## 2023-10-08 ASSESSMENT — PAIN DESCRIPTION - DESCRIPTORS
DESCRIPTORS: ACHING
DESCRIPTORS: SORE

## 2023-10-09 LAB
ALBUMIN SERPL-MCNC: 3.3 G/DL (ref 3.5–5.2)
ALP SERPL-CCNC: 63 U/L (ref 35–104)
ALT SERPL-CCNC: 7 U/L (ref 5–33)
ANION GAP SERPL CALCULATED.3IONS-SCNC: 15 MMOL/L (ref 7–19)
AST SERPL-CCNC: 15 U/L (ref 5–32)
BASOPHILS # BLD: 0.1 K/UL (ref 0–0.2)
BASOPHILS NFR BLD: 0.6 % (ref 0–1)
BILIRUB SERPL-MCNC: <0.2 MG/DL (ref 0.2–1.2)
BUN SERPL-MCNC: 19 MG/DL (ref 8–23)
CALCIUM SERPL-MCNC: 8.3 MG/DL (ref 8.8–10.2)
CHLORIDE SERPL-SCNC: 97 MMOL/L (ref 98–111)
CO2 SERPL-SCNC: 28 MMOL/L (ref 22–29)
CREAT SERPL-MCNC: 1.2 MG/DL (ref 0.5–0.9)
EOSINOPHIL # BLD: 0.1 K/UL (ref 0–0.6)
EOSINOPHIL NFR BLD: 0.7 % (ref 0–5)
ERYTHROCYTE [DISTWIDTH] IN BLOOD BY AUTOMATED COUNT: 18.1 % (ref 11.5–14.5)
GLUCOSE BLD-MCNC: 107 MG/DL (ref 70–99)
GLUCOSE BLD-MCNC: 117 MG/DL (ref 70–99)
GLUCOSE BLD-MCNC: 122 MG/DL (ref 70–99)
GLUCOSE BLD-MCNC: 124 MG/DL (ref 70–99)
GLUCOSE SERPL-MCNC: 102 MG/DL (ref 74–109)
HCT VFR BLD AUTO: 26.3 % (ref 37–47)
HCT VFR BLD AUTO: 27.1 % (ref 37–47)
HCT VFR BLD AUTO: 27.7 % (ref 37–47)
HGB BLD-MCNC: 7.8 G/DL (ref 12–16)
HGB BLD-MCNC: 8.1 G/DL (ref 12–16)
HGB BLD-MCNC: 8.2 G/DL (ref 12–16)
IMM GRANULOCYTES # BLD: 0 K/UL
LYMPHOCYTES # BLD: 1.8 K/UL (ref 1.1–4.5)
LYMPHOCYTES NFR BLD: 19.7 % (ref 20–40)
MAGNESIUM SERPL-MCNC: 1.7 MG/DL (ref 1.6–2.4)
MCH RBC QN AUTO: 24.8 PG (ref 27–31)
MCHC RBC AUTO-ENTMCNC: 29.7 G/DL (ref 33–37)
MCV RBC AUTO: 83.5 FL (ref 81–99)
MONOCYTES # BLD: 0.7 K/UL (ref 0–0.9)
MONOCYTES NFR BLD: 7.3 % (ref 0–10)
NEUTROPHILS # BLD: 6.5 K/UL (ref 1.5–7.5)
NEUTS SEG NFR BLD: 71.4 % (ref 50–65)
PERFORMED ON: ABNORMAL
PLATELET # BLD AUTO: 231 K/UL (ref 130–400)
PMV BLD AUTO: 10 FL (ref 9.4–12.3)
POTASSIUM SERPL-SCNC: 3.3 MMOL/L (ref 3.5–5)
PROT SERPL-MCNC: 6 G/DL (ref 6.6–8.7)
RBC # BLD AUTO: 3.15 M/UL (ref 4.2–5.4)
SODIUM SERPL-SCNC: 140 MMOL/L (ref 136–145)
TROPONIN T SERPL-MCNC: 0.08 NG/ML (ref 0–0.03)
WBC # BLD AUTO: 9 K/UL (ref 4.8–10.8)

## 2023-10-09 PROCEDURE — 83735 ASSAY OF MAGNESIUM: CPT

## 2023-10-09 PROCEDURE — 85025 COMPLETE CBC W/AUTO DIFF WBC: CPT

## 2023-10-09 PROCEDURE — 94760 N-INVAS EAR/PLS OXIMETRY 1: CPT

## 2023-10-09 PROCEDURE — 36415 COLL VENOUS BLD VENIPUNCTURE: CPT

## 2023-10-09 PROCEDURE — 2580000003 HC RX 258

## 2023-10-09 PROCEDURE — 6370000000 HC RX 637 (ALT 250 FOR IP): Performed by: HOSPITALIST

## 2023-10-09 PROCEDURE — 2700000000 HC OXYGEN THERAPY PER DAY

## 2023-10-09 PROCEDURE — 6370000000 HC RX 637 (ALT 250 FOR IP): Performed by: INTERNAL MEDICINE

## 2023-10-09 PROCEDURE — 6360000002 HC RX W HCPCS

## 2023-10-09 PROCEDURE — 80053 COMPREHEN METABOLIC PANEL: CPT

## 2023-10-09 PROCEDURE — 6360000002 HC RX W HCPCS: Performed by: INTERNAL MEDICINE

## 2023-10-09 PROCEDURE — 99232 SBSQ HOSP IP/OBS MODERATE 35: CPT | Performed by: INTERNAL MEDICINE

## 2023-10-09 PROCEDURE — 2140000000 HC CCU INTERMEDIATE R&B

## 2023-10-09 PROCEDURE — 2500000003 HC RX 250 WO HCPCS: Performed by: INTERNAL MEDICINE

## 2023-10-09 PROCEDURE — 82962 GLUCOSE BLOOD TEST: CPT

## 2023-10-09 PROCEDURE — 84484 ASSAY OF TROPONIN QUANT: CPT

## 2023-10-09 PROCEDURE — 99231 SBSQ HOSP IP/OBS SF/LOW 25: CPT | Performed by: SURGERY

## 2023-10-09 PROCEDURE — 85014 HEMATOCRIT: CPT

## 2023-10-09 PROCEDURE — 85018 HEMOGLOBIN: CPT

## 2023-10-09 RX ADMIN — METRONIDAZOLE 500 MG: 5 INJECTION, SOLUTION INTRAVENOUS at 11:36

## 2023-10-09 RX ADMIN — HYDROMORPHONE HYDROCHLORIDE 0.5 MG: 1 INJECTION, SOLUTION INTRAMUSCULAR; INTRAVENOUS; SUBCUTANEOUS at 05:30

## 2023-10-09 RX ADMIN — HYDROMORPHONE HYDROCHLORIDE 0.5 MG: 1 INJECTION, SOLUTION INTRAMUSCULAR; INTRAVENOUS; SUBCUTANEOUS at 23:41

## 2023-10-09 RX ADMIN — METOPROLOL TARTRATE 5 MG: 5 INJECTION INTRAVENOUS at 14:38

## 2023-10-09 RX ADMIN — METOPROLOL TARTRATE 5 MG: 5 INJECTION INTRAVENOUS at 08:27

## 2023-10-09 RX ADMIN — METOPROLOL TARTRATE 5 MG: 5 INJECTION INTRAVENOUS at 01:34

## 2023-10-09 RX ADMIN — ONDANSETRON 4 MG: 2 INJECTION INTRAMUSCULAR; INTRAVENOUS at 23:41

## 2023-10-09 RX ADMIN — HYDROMORPHONE HYDROCHLORIDE 0.5 MG: 1 INJECTION, SOLUTION INTRAMUSCULAR; INTRAVENOUS; SUBCUTANEOUS at 13:41

## 2023-10-09 RX ADMIN — HYDROMORPHONE HYDROCHLORIDE 0.5 MG: 1 INJECTION, SOLUTION INTRAMUSCULAR; INTRAVENOUS; SUBCUTANEOUS at 09:00

## 2023-10-09 RX ADMIN — METRONIDAZOLE 500 MG: 5 INJECTION, SOLUTION INTRAVENOUS at 03:31

## 2023-10-09 RX ADMIN — HYDROMORPHONE HYDROCHLORIDE 0.5 MG: 1 INJECTION, SOLUTION INTRAMUSCULAR; INTRAVENOUS; SUBCUTANEOUS at 01:34

## 2023-10-09 RX ADMIN — METRONIDAZOLE 500 MG: 5 INJECTION, SOLUTION INTRAVENOUS at 20:52

## 2023-10-09 RX ADMIN — SODIUM CHLORIDE, PRESERVATIVE FREE 10 ML: 5 INJECTION INTRAVENOUS at 20:49

## 2023-10-09 RX ADMIN — IRON SUCROSE 200 MG: 20 INJECTION, SOLUTION INTRAVENOUS at 14:38

## 2023-10-09 RX ADMIN — ONDANSETRON 4 MG: 2 INJECTION INTRAMUSCULAR; INTRAVENOUS at 16:58

## 2023-10-09 RX ADMIN — ISOSORBIDE MONONITRATE 30 MG: 30 TABLET, EXTENDED RELEASE ORAL at 22:37

## 2023-10-09 RX ADMIN — HYDRALAZINE HYDROCHLORIDE 25 MG: 25 TABLET, FILM COATED ORAL at 13:41

## 2023-10-09 RX ADMIN — HYDRALAZINE HYDROCHLORIDE 25 MG: 25 TABLET, FILM COATED ORAL at 22:37

## 2023-10-09 RX ADMIN — ONDANSETRON 4 MG: 2 INJECTION INTRAMUSCULAR; INTRAVENOUS at 11:42

## 2023-10-09 RX ADMIN — SODIUM CHLORIDE, POTASSIUM CHLORIDE, SODIUM LACTATE AND CALCIUM CHLORIDE: 600; 310; 30; 20 INJECTION, SOLUTION INTRAVENOUS at 03:03

## 2023-10-09 RX ADMIN — ONDANSETRON 4 MG: 2 INJECTION INTRAMUSCULAR; INTRAVENOUS at 03:31

## 2023-10-09 RX ADMIN — HYDROMORPHONE HYDROCHLORIDE 0.5 MG: 1 INJECTION, SOLUTION INTRAMUSCULAR; INTRAVENOUS; SUBCUTANEOUS at 18:27

## 2023-10-09 RX ADMIN — WATER 1000 MG: 1 INJECTION INTRAMUSCULAR; INTRAVENOUS; SUBCUTANEOUS at 13:41

## 2023-10-09 RX ADMIN — HYDRALAZINE HYDROCHLORIDE 25 MG: 25 TABLET, FILM COATED ORAL at 05:29

## 2023-10-09 RX ADMIN — METOPROLOL TARTRATE 5 MG: 5 INJECTION INTRAVENOUS at 20:49

## 2023-10-09 ASSESSMENT — PAIN DESCRIPTION - DESCRIPTORS: DESCRIPTORS: ACHING

## 2023-10-09 ASSESSMENT — PAIN SCALES - GENERAL
PAINLEVEL_OUTOF10: 10
PAINLEVEL_OUTOF10: 8
PAINLEVEL_OUTOF10: 10
PAINLEVEL_OUTOF10: 10
PAINLEVEL_OUTOF10: 7

## 2023-10-09 ASSESSMENT — PAIN DESCRIPTION - LOCATION
LOCATION: ABDOMEN

## 2023-10-09 ASSESSMENT — PAIN DESCRIPTION - ORIENTATION: ORIENTATION: OTHER (COMMENT)

## 2023-10-09 NOTE — PLAN OF CARE
Problem: Discharge Planning  Goal: Discharge to home or other facility with appropriate resources  Outcome: Progressing     Problem: Pain  Goal: Verbalizes/displays adequate comfort level or baseline comfort level  10/9/2023 0240 by Margie Felix RN  Outcome: Progressing  10/8/2023 1819 by Migdalia Anguiano RN  Outcome: Progressing     Problem: Safety - Adult  Goal: Free from fall injury  10/9/2023 0240 by Margie Felix RN  Outcome: Progressing  10/8/2023 1819 by Migdalia Anguiano RN  Outcome: Progressing     Problem: ABCDS Injury Assessment  Goal: Absence of physical injury  Outcome: Progressing     Problem: Nutrition Deficit:  Goal: Optimize nutritional status  Outcome: Progressing     Problem: Skin/Tissue Integrity  Goal: Absence of new skin breakdown  Description: 1. Monitor for areas of redness and/or skin breakdown  2. Assess vascular access sites hourly  3. Every 4-6 hours minimum:  Change oxygen saturation probe site  4. Every 4-6 hours:  If on nasal continuous positive airway pressure, respiratory therapy assess nares and determine need for appliance change or resting period.   Outcome: Progressing

## 2023-10-10 ENCOUNTER — APPOINTMENT (OUTPATIENT)
Dept: GENERAL RADIOLOGY | Age: 73
DRG: 853 | End: 2023-10-10
Payer: MEDICARE

## 2023-10-10 ENCOUNTER — APPOINTMENT (OUTPATIENT)
Dept: CARDIAC CATH/INVASIVE PROCEDURES | Age: 73
DRG: 853 | End: 2023-10-10
Payer: MEDICARE

## 2023-10-10 LAB
ANION GAP SERPL CALCULATED.3IONS-SCNC: 18 MMOL/L (ref 7–19)
BASOPHILS # BLD: 0 K/UL (ref 0–0.2)
BASOPHILS NFR BLD: 0.2 % (ref 0–1)
BUN SERPL-MCNC: 13 MG/DL (ref 8–23)
CALCIUM SERPL-MCNC: 8.9 MG/DL (ref 8.8–10.2)
CHLORIDE SERPL-SCNC: 93 MMOL/L (ref 98–111)
CO2 SERPL-SCNC: 31 MMOL/L (ref 22–29)
CREAT SERPL-MCNC: 0.9 MG/DL (ref 0.5–0.9)
EOSINOPHIL # BLD: 0 K/UL (ref 0–0.6)
EOSINOPHIL NFR BLD: 0.1 % (ref 0–5)
ERYTHROCYTE [DISTWIDTH] IN BLOOD BY AUTOMATED COUNT: 17.6 % (ref 11.5–14.5)
GLUCOSE BLD-MCNC: 121 MG/DL (ref 70–99)
GLUCOSE BLD-MCNC: 135 MG/DL (ref 70–99)
GLUCOSE BLD-MCNC: 153 MG/DL (ref 70–99)
GLUCOSE BLD-MCNC: 174 MG/DL (ref 70–99)
GLUCOSE SERPL-MCNC: 116 MG/DL (ref 74–109)
HCT VFR BLD AUTO: 27.9 % (ref 37–47)
HCT VFR BLD AUTO: 30.6 % (ref 37–47)
HGB BLD-MCNC: 8.3 G/DL (ref 12–16)
HGB BLD-MCNC: 9.1 G/DL (ref 12–16)
IMM GRANULOCYTES # BLD: 0.1 K/UL
LYMPHOCYTES # BLD: 1.4 K/UL (ref 1.1–4.5)
LYMPHOCYTES NFR BLD: 10.3 % (ref 20–40)
MAGNESIUM SERPL-MCNC: 1.5 MG/DL (ref 1.6–2.4)
MCH RBC QN AUTO: 24.8 PG (ref 27–31)
MCHC RBC AUTO-ENTMCNC: 29.7 G/DL (ref 33–37)
MCV RBC AUTO: 83.4 FL (ref 81–99)
MONOCYTES # BLD: 0.7 K/UL (ref 0–0.9)
MONOCYTES NFR BLD: 5.1 % (ref 0–10)
NEUTROPHILS # BLD: 11.1 K/UL (ref 1.5–7.5)
NEUTS SEG NFR BLD: 83.6 % (ref 50–65)
PERFORMED ON: ABNORMAL
PLATELET # BLD AUTO: 256 K/UL (ref 130–400)
PMV BLD AUTO: 9.7 FL (ref 9.4–12.3)
POTASSIUM SERPL-SCNC: 3.2 MMOL/L (ref 3.5–5)
RBC # BLD AUTO: 3.67 M/UL (ref 4.2–5.4)
SODIUM SERPL-SCNC: 142 MMOL/L (ref 136–145)
WBC # BLD AUTO: 13.2 K/UL (ref 4.8–10.8)

## 2023-10-10 PROCEDURE — 6360000004 HC RX CONTRAST MEDICATION: Performed by: INTERNAL MEDICINE

## 2023-10-10 PROCEDURE — 5A0221D ASSISTANCE WITH CARDIAC OUTPUT USING IMPELLER PUMP, CONTINUOUS: ICD-10-PCS | Performed by: INTERNAL MEDICINE

## 2023-10-10 PROCEDURE — C1725 CATH, TRANSLUMIN NON-LASER: HCPCS

## 2023-10-10 PROCEDURE — 93005 ELECTROCARDIOGRAM TRACING: CPT | Performed by: INTERNAL MEDICINE

## 2023-10-10 PROCEDURE — 02HA3RZ INSERTION OF SHORT-TERM EXTERNAL HEART ASSIST SYSTEM INTO HEART, PERCUTANEOUS APPROACH: ICD-10-PCS | Performed by: INTERNAL MEDICINE

## 2023-10-10 PROCEDURE — 93458 L HRT ARTERY/VENTRICLE ANGIO: CPT

## 2023-10-10 PROCEDURE — 6370000000 HC RX 637 (ALT 250 FOR IP): Performed by: INTERNAL MEDICINE

## 2023-10-10 PROCEDURE — 83735 ASSAY OF MAGNESIUM: CPT

## 2023-10-10 PROCEDURE — 6360000002 HC RX W HCPCS

## 2023-10-10 PROCEDURE — 99152 MOD SED SAME PHYS/QHP 5/>YRS: CPT

## 2023-10-10 PROCEDURE — 93571 IV DOP VEL&/PRESS C FLO 1ST: CPT

## 2023-10-10 PROCEDURE — 6360000002 HC RX W HCPCS: Performed by: INTERNAL MEDICINE

## 2023-10-10 PROCEDURE — 93458 L HRT ARTERY/VENTRICLE ANGIO: CPT | Performed by: INTERNAL MEDICINE

## 2023-10-10 PROCEDURE — 85018 HEMOGLOBIN: CPT

## 2023-10-10 PROCEDURE — 2000000000 HC ICU R&B

## 2023-10-10 PROCEDURE — C1874 STENT, COATED/COV W/DEL SYS: HCPCS

## 2023-10-10 PROCEDURE — 74018 RADEX ABDOMEN 1 VIEW: CPT

## 2023-10-10 PROCEDURE — 2700000000 HC OXYGEN THERAPY PER DAY

## 2023-10-10 PROCEDURE — 6370000000 HC RX 637 (ALT 250 FOR IP): Performed by: HOSPITALIST

## 2023-10-10 PROCEDURE — C1889 IMPLANT/INSERT DEVICE, NOC: HCPCS

## 2023-10-10 PROCEDURE — 2500000003 HC RX 250 WO HCPCS: Performed by: INTERNAL MEDICINE

## 2023-10-10 PROCEDURE — 82962 GLUCOSE BLOOD TEST: CPT

## 2023-10-10 PROCEDURE — 85014 HEMATOCRIT: CPT

## 2023-10-10 PROCEDURE — B2111ZZ FLUOROSCOPY OF MULTIPLE CORONARY ARTERIES USING LOW OSMOLAR CONTRAST: ICD-10-PCS | Performed by: INTERNAL MEDICINE

## 2023-10-10 PROCEDURE — 94760 N-INVAS EAR/PLS OXIMETRY 1: CPT

## 2023-10-10 PROCEDURE — 99231 SBSQ HOSP IP/OBS SF/LOW 25: CPT | Performed by: SURGERY

## 2023-10-10 PROCEDURE — 6360000002 HC RX W HCPCS: Performed by: HOSPITALIST

## 2023-10-10 PROCEDURE — C1769 GUIDE WIRE: HCPCS

## 2023-10-10 PROCEDURE — 02PA3RZ REMOVAL OF SHORT-TERM EXTERNAL HEART ASSIST SYSTEM FROM HEART, PERCUTANEOUS APPROACH: ICD-10-PCS | Performed by: INTERNAL MEDICINE

## 2023-10-10 PROCEDURE — 33990 INSJ PERQ VAD L HRT ARTERIAL: CPT | Performed by: INTERNAL MEDICINE

## 2023-10-10 PROCEDURE — 92928 PRQ TCAT PLMT NTRAC ST 1 LES: CPT

## 2023-10-10 PROCEDURE — C1760 CLOSURE DEV, VASC: HCPCS

## 2023-10-10 PROCEDURE — 2500000003 HC RX 250 WO HCPCS

## 2023-10-10 PROCEDURE — 027034Z DILATION OF CORONARY ARTERY, ONE ARTERY WITH DRUG-ELUTING INTRALUMINAL DEVICE, PERCUTANEOUS APPROACH: ICD-10-PCS | Performed by: INTERNAL MEDICINE

## 2023-10-10 PROCEDURE — 2580000003 HC RX 258: Performed by: INTERNAL MEDICINE

## 2023-10-10 PROCEDURE — 92928 PRQ TCAT PLMT NTRAC ST 1 LES: CPT | Performed by: INTERNAL MEDICINE

## 2023-10-10 PROCEDURE — 99152 MOD SED SAME PHYS/QHP 5/>YRS: CPT | Performed by: INTERNAL MEDICINE

## 2023-10-10 PROCEDURE — G0278 ILIAC ART ANGIO,CARDIAC CATH: HCPCS

## 2023-10-10 PROCEDURE — 33990 INSJ PERQ VAD L HRT ARTERIAL: CPT

## 2023-10-10 PROCEDURE — 85025 COMPLETE CBC W/AUTO DIFF WBC: CPT

## 2023-10-10 PROCEDURE — C1894 INTRO/SHEATH, NON-LASER: HCPCS

## 2023-10-10 PROCEDURE — 4A023N7 MEASUREMENT OF CARDIAC SAMPLING AND PRESSURE, LEFT HEART, PERCUTANEOUS APPROACH: ICD-10-PCS | Performed by: INTERNAL MEDICINE

## 2023-10-10 PROCEDURE — 93571 IV DOP VEL&/PRESS C FLO 1ST: CPT | Performed by: INTERNAL MEDICINE

## 2023-10-10 PROCEDURE — 2709999900 HC NON-CHARGEABLE SUPPLY

## 2023-10-10 PROCEDURE — 2580000003 HC RX 258

## 2023-10-10 PROCEDURE — 80048 BASIC METABOLIC PNL TOTAL CA: CPT

## 2023-10-10 PROCEDURE — 99153 MOD SED SAME PHYS/QHP EA: CPT

## 2023-10-10 PROCEDURE — 6370000000 HC RX 637 (ALT 250 FOR IP)

## 2023-10-10 PROCEDURE — 36415 COLL VENOUS BLD VENIPUNCTURE: CPT

## 2023-10-10 PROCEDURE — C1887 CATHETER, GUIDING: HCPCS

## 2023-10-10 PROCEDURE — B2151ZZ FLUOROSCOPY OF LEFT HEART USING LOW OSMOLAR CONTRAST: ICD-10-PCS | Performed by: INTERNAL MEDICINE

## 2023-10-10 RX ORDER — ASPIRIN 81 MG/1
81 TABLET, CHEWABLE ORAL DAILY
Status: DISCONTINUED | OUTPATIENT
Start: 2023-10-10 | End: 2023-10-22 | Stop reason: HOSPADM

## 2023-10-10 RX ORDER — SODIUM CHLORIDE 9 MG/ML
INJECTION, SOLUTION INTRAVENOUS PRN
Status: DISCONTINUED | OUTPATIENT
Start: 2023-10-10 | End: 2023-10-14 | Stop reason: SDUPTHER

## 2023-10-10 RX ORDER — HYDROCODONE BITARTRATE AND ACETAMINOPHEN 5; 325 MG/1; MG/1
1 TABLET ORAL EVERY 4 HOURS PRN
Status: DISCONTINUED | OUTPATIENT
Start: 2023-10-10 | End: 2023-10-12

## 2023-10-10 RX ORDER — ACETAMINOPHEN 325 MG/1
650 TABLET ORAL EVERY 4 HOURS PRN
Status: DISCONTINUED | OUTPATIENT
Start: 2023-10-10 | End: 2023-10-22 | Stop reason: HOSPADM

## 2023-10-10 RX ORDER — SODIUM CHLORIDE 0.9 % (FLUSH) 0.9 %
5-40 SYRINGE (ML) INJECTION PRN
Status: DISCONTINUED | OUTPATIENT
Start: 2023-10-10 | End: 2023-10-21 | Stop reason: SDUPTHER

## 2023-10-10 RX ORDER — AMLODIPINE BESYLATE 5 MG/1
5 TABLET ORAL DAILY
Status: DISCONTINUED | OUTPATIENT
Start: 2023-10-10 | End: 2023-10-22 | Stop reason: HOSPADM

## 2023-10-10 RX ORDER — CLOPIDOGREL BISULFATE 75 MG/1
75 TABLET ORAL DAILY
Status: DISCONTINUED | OUTPATIENT
Start: 2023-10-11 | End: 2023-10-22 | Stop reason: HOSPADM

## 2023-10-10 RX ORDER — ATROPINE SULFATE 1 MG/ML
0.5 INJECTION, SOLUTION INTRAVENOUS
Status: ACTIVE | OUTPATIENT
Start: 2023-10-10 | End: 2023-10-11

## 2023-10-10 RX ORDER — FENTANYL CITRATE 50 UG/ML
25 INJECTION, SOLUTION INTRAMUSCULAR; INTRAVENOUS
Status: ACTIVE | OUTPATIENT
Start: 2023-10-10 | End: 2023-10-11

## 2023-10-10 RX ORDER — NITROGLYCERIN 0.4 MG/1
0.4 TABLET SUBLINGUAL EVERY 5 MIN PRN
Status: DISCONTINUED | OUTPATIENT
Start: 2023-10-10 | End: 2023-10-22 | Stop reason: HOSPADM

## 2023-10-10 RX ORDER — SODIUM CHLORIDE 0.9 % (FLUSH) 0.9 %
5-40 SYRINGE (ML) INJECTION EVERY 12 HOURS SCHEDULED
Status: DISCONTINUED | OUTPATIENT
Start: 2023-10-10 | End: 2023-10-21 | Stop reason: SDUPTHER

## 2023-10-10 RX ORDER — ASPIRIN 81 MG/1
81 TABLET, CHEWABLE ORAL DAILY
Status: CANCELLED | OUTPATIENT
Start: 2023-10-10

## 2023-10-10 RX ORDER — HYDROCODONE BITARTRATE AND ACETAMINOPHEN 5; 325 MG/1; MG/1
2 TABLET ORAL EVERY 4 HOURS PRN
Status: DISCONTINUED | OUTPATIENT
Start: 2023-10-10 | End: 2023-10-12

## 2023-10-10 RX ADMIN — METOPROLOL TARTRATE 5 MG: 5 INJECTION INTRAVENOUS at 01:12

## 2023-10-10 RX ADMIN — METRONIDAZOLE 500 MG: 5 INJECTION, SOLUTION INTRAVENOUS at 12:39

## 2023-10-10 RX ADMIN — IOPAMIDOL 230 ML: 612 INJECTION, SOLUTION INTRAVENOUS at 17:50

## 2023-10-10 RX ADMIN — HYDROMORPHONE HYDROCHLORIDE 0.5 MG: 1 INJECTION, SOLUTION INTRAMUSCULAR; INTRAVENOUS; SUBCUTANEOUS at 08:04

## 2023-10-10 RX ADMIN — HYDRALAZINE HYDROCHLORIDE 5 MG: 20 INJECTION INTRAMUSCULAR; INTRAVENOUS at 14:11

## 2023-10-10 RX ADMIN — IRON SUCROSE 200 MG: 20 INJECTION, SOLUTION INTRAVENOUS at 14:06

## 2023-10-10 RX ADMIN — HYDROMORPHONE HYDROCHLORIDE 0.5 MG: 1 INJECTION, SOLUTION INTRAMUSCULAR; INTRAVENOUS; SUBCUTANEOUS at 22:09

## 2023-10-10 RX ADMIN — SODIUM BICARBONATE: 84 INJECTION, SOLUTION INTRAVENOUS at 18:30

## 2023-10-10 RX ADMIN — WATER 1000 MG: 1 INJECTION INTRAMUSCULAR; INTRAVENOUS; SUBCUTANEOUS at 14:07

## 2023-10-10 RX ADMIN — HYDRALAZINE HYDROCHLORIDE 25 MG: 25 TABLET, FILM COATED ORAL at 06:05

## 2023-10-10 RX ADMIN — PROMETHAZINE HYDROCHLORIDE 12.5 MG: 12.5 TABLET ORAL at 09:57

## 2023-10-10 RX ADMIN — METOPROLOL TARTRATE 5 MG: 5 INJECTION INTRAVENOUS at 20:35

## 2023-10-10 RX ADMIN — SODIUM BICARBONATE: 84 INJECTION, SOLUTION INTRAVENOUS at 21:40

## 2023-10-10 RX ADMIN — METOPROLOL TARTRATE 5 MG: 5 INJECTION INTRAVENOUS at 14:06

## 2023-10-10 RX ADMIN — METRONIDAZOLE 500 MG: 5 INJECTION, SOLUTION INTRAVENOUS at 20:16

## 2023-10-10 RX ADMIN — ONDANSETRON 4 MG: 2 INJECTION INTRAMUSCULAR; INTRAVENOUS at 08:04

## 2023-10-10 RX ADMIN — AMLODIPINE BESYLATE 5 MG: 5 TABLET ORAL at 09:57

## 2023-10-10 RX ADMIN — ASPIRIN 81 MG: 81 TABLET, CHEWABLE ORAL at 20:35

## 2023-10-10 RX ADMIN — INSULIN GLARGINE 20 UNITS: 100 INJECTION, SOLUTION SUBCUTANEOUS at 20:34

## 2023-10-10 RX ADMIN — HYDROMORPHONE HYDROCHLORIDE 0.5 MG: 1 INJECTION, SOLUTION INTRAMUSCULAR; INTRAVENOUS; SUBCUTANEOUS at 10:02

## 2023-10-10 RX ADMIN — HYDROMORPHONE HYDROCHLORIDE 0.5 MG: 1 INJECTION, SOLUTION INTRAMUSCULAR; INTRAVENOUS; SUBCUTANEOUS at 03:18

## 2023-10-10 RX ADMIN — CARVEDILOL 25 MG: 25 TABLET, FILM COATED ORAL at 18:38

## 2023-10-10 RX ADMIN — METRONIDAZOLE 500 MG: 5 INJECTION, SOLUTION INTRAVENOUS at 03:23

## 2023-10-10 RX ADMIN — METOPROLOL TARTRATE 5 MG: 5 INJECTION INTRAVENOUS at 08:04

## 2023-10-10 RX ADMIN — ONDANSETRON 4 MG: 2 INJECTION INTRAMUSCULAR; INTRAVENOUS at 14:06

## 2023-10-10 RX ADMIN — HYDROMORPHONE HYDROCHLORIDE 0.5 MG: 1 INJECTION, SOLUTION INTRAMUSCULAR; INTRAVENOUS; SUBCUTANEOUS at 19:26

## 2023-10-10 RX ADMIN — ONDANSETRON 4 MG: 2 INJECTION INTRAMUSCULAR; INTRAVENOUS at 18:29

## 2023-10-10 RX ADMIN — HYDROMORPHONE HYDROCHLORIDE 0.5 MG: 1 INJECTION, SOLUTION INTRAMUSCULAR; INTRAVENOUS; SUBCUTANEOUS at 14:09

## 2023-10-10 ASSESSMENT — PAIN DESCRIPTION - LOCATION
LOCATION: ABDOMEN
LOCATION: GROIN
LOCATION: ABDOMEN
LOCATION: GROIN

## 2023-10-10 ASSESSMENT — PAIN SCALES - GENERAL
PAINLEVEL_OUTOF10: 10
PAINLEVEL_OUTOF10: 7
PAINLEVEL_OUTOF10: 8
PAINLEVEL_OUTOF10: 8
PAINLEVEL_OUTOF10: 7
PAINLEVEL_OUTOF10: 10

## 2023-10-10 ASSESSMENT — PAIN DESCRIPTION - DESCRIPTORS
DESCRIPTORS: ACHING

## 2023-10-10 ASSESSMENT — PAIN DESCRIPTION - ORIENTATION
ORIENTATION: RIGHT;LEFT
ORIENTATION: OTHER (COMMENT)
ORIENTATION: RIGHT;LEFT

## 2023-10-10 NOTE — PROCEDURES
Cardiac catheterization preliminary note:    Left main distal 60% stenosis. iFR 0.76 in proximal LAD beyond left main lesion suggestive of significant obstructive lesion. Left main trifurcates into LAD, ramus and a large circumflex. Codominant system. LAD proximal diffuse 50% stenosis without significant obstructive disease noted. Circumflex without significant obstructive disease. Ramus without significant obstructive disease. RCA is a smaller vessel with mid diffuse 80% stenosis. LV ejection fraction normal.    Difficult circumstances with left main stenosis that appears obstructive with non-STEMI presentation in the setting of acute abdomen with small bowel obstruction that has not alleviated and will require surgical intervention. Have had CT surgery evaluate patient while on the cath table regarding risks of CABG in this setting which can be prohibitive with an acute abdomen and bowel issues. Option of left main stent discussed in detail. General surgery is willing to operate on Plavix. Left main lesion appears focal and amenable to PCI. Decision to proceed with PCI to left main with Impella support. Plavix to be maintained uninterrupted. Impella placed via right groin. Due to smaller caliber of iliac system, access was obtained in the left groin and intervention performed via left femoral artery. Successful PCI to left main with a 4.0 x 8 mm Nnamdi frontier stent with good result. Patient tolerated procedure without any issues. Hemodynamically stable throughout. Impella removed and iliac arteries evaluated to ensure no vascular issues with angiography over the horn from left system. Hemostasis achieved. Patient taken from the room in stable condition. Continue Plavix uninterrupted for minimum 6 months. Proceed with operative care of acute abdomen/SBO as clinically indicated.

## 2023-10-10 NOTE — CONSULTS
Department of Cardiothoracic Surgery    CHIEF COMPLAINT:    Chief Complaint   Patient presents with    Abdominal Pain     Epigastric, nausea/ vomiting, describes as cramping/burning/stabbing. HISTORY OF PRESENT ILLNESS:      This is a 75y.o. year old female with past medical history of longstanding ongoing tobacco use with probable COPD, hypertension presenting with acute abdomen with small bowel obstruction. She is indicated for abd exploration on the basis of failure of medical management. Cardiology was consulted after +troponins with EKG changes and echo showed ejection fraction 45% with basal to mid anteroseptal wall motion abnormality. Left heart cath obtained today showing left main CAD. CT surgery consulted to discuss the range of revascularization options.     Past Medical History:   Diagnosis Date    Anxiety     Arthritis     Back pain of lumbar region with sciatica     Chronic kidney disease     Disease of blood and blood forming organ     Hyperlipidemia     Hypertension     Movement disorder     Other disorders of kidney and ureter in diseases classified elsewhere     Panic attack     PONV (postoperative nausea and vomiting)        Social History     Socioeconomic History    Marital status:      Spouse name: Not on file    Number of children: Not on file    Years of education: Not on file    Highest education level: Not on file   Occupational History    Not on file   Tobacco Use    Smoking status: Every Day     Packs/day: 2.00     Years: 54.00     Additional pack years: 0.00     Total pack years: 108.00     Types: Cigarettes    Smokeless tobacco: Never   Vaping Use    Vaping Use: Never used   Substance and Sexual Activity    Alcohol use: Not Currently     Comment: occasional    Drug use: No    Sexual activity: Not on file   Other Topics Concern    Not on file   Social History Narrative    Not on file     Social Determinants of Health     Financial Resource Strain: Not on file   Food

## 2023-10-11 ENCOUNTER — ANESTHESIA EVENT (OUTPATIENT)
Dept: OPERATING ROOM | Age: 73
End: 2023-10-11
Payer: MEDICARE

## 2023-10-11 ENCOUNTER — APPOINTMENT (OUTPATIENT)
Dept: GENERAL RADIOLOGY | Age: 73
DRG: 853 | End: 2023-10-11
Payer: MEDICARE

## 2023-10-11 LAB
ANION GAP SERPL CALCULATED.3IONS-SCNC: 15 MMOL/L (ref 7–19)
BASOPHILS # BLD: 0 K/UL (ref 0–0.2)
BASOPHILS NFR BLD: 0.3 % (ref 0–1)
BUN SERPL-MCNC: 11 MG/DL (ref 8–23)
CALCIUM SERPL-MCNC: 7.6 MG/DL (ref 8.8–10.2)
CHLORIDE SERPL-SCNC: 96 MMOL/L (ref 98–111)
CO2 SERPL-SCNC: 33 MMOL/L (ref 22–29)
CREAT SERPL-MCNC: 0.9 MG/DL (ref 0.5–0.9)
EKG P AXIS: 60 DEGREES
EKG P-R INTERVAL: 140 MS
EKG Q-T INTERVAL: 456 MS
EKG QRS DURATION: 78 MS
EKG QTC CALCULATION (BAZETT): 477 MS
EKG T AXIS: 42 DEGREES
EOSINOPHIL # BLD: 0 K/UL (ref 0–0.6)
EOSINOPHIL NFR BLD: 0.2 % (ref 0–5)
ERYTHROCYTE [DISTWIDTH] IN BLOOD BY AUTOMATED COUNT: 17.7 % (ref 11.5–14.5)
GLUCOSE BLD-MCNC: 107 MG/DL (ref 70–99)
GLUCOSE BLD-MCNC: 92 MG/DL (ref 70–99)
GLUCOSE BLD-MCNC: 94 MG/DL (ref 70–99)
GLUCOSE BLD-MCNC: 98 MG/DL (ref 70–99)
GLUCOSE SERPL-MCNC: 83 MG/DL (ref 74–109)
HCT VFR BLD AUTO: 25.8 % (ref 37–47)
HCT VFR BLD AUTO: 26.6 % (ref 37–47)
HGB BLD-MCNC: 7.5 G/DL (ref 12–16)
HGB BLD-MCNC: 7.8 G/DL (ref 12–16)
IMM GRANULOCYTES # BLD: 0.1 K/UL
LYMPHOCYTES # BLD: 1.4 K/UL (ref 1.1–4.5)
LYMPHOCYTES NFR BLD: 12.8 % (ref 20–40)
MAGNESIUM SERPL-MCNC: 1.3 MG/DL (ref 1.6–2.4)
MAGNESIUM SERPL-MCNC: 2.6 MG/DL (ref 1.6–2.4)
MCH RBC QN AUTO: 24.8 PG (ref 27–31)
MCHC RBC AUTO-ENTMCNC: 30.2 G/DL (ref 33–37)
MCV RBC AUTO: 81.9 FL (ref 81–99)
MONOCYTES # BLD: 0.8 K/UL (ref 0–0.9)
MONOCYTES NFR BLD: 7.1 % (ref 0–10)
NEUTROPHILS # BLD: 8.9 K/UL (ref 1.5–7.5)
NEUTS SEG NFR BLD: 79.1 % (ref 50–65)
PERFORMED ON: ABNORMAL
PERFORMED ON: NORMAL
PLATELET # BLD AUTO: 219 K/UL (ref 130–400)
PMV BLD AUTO: 10 FL (ref 9.4–12.3)
POTASSIUM SERPL-SCNC: 2.6 MMOL/L (ref 3.5–5)
POTASSIUM SERPL-SCNC: 3.4 MMOL/L (ref 3.5–5)
RBC # BLD AUTO: 3.15 M/UL (ref 4.2–5.4)
SODIUM SERPL-SCNC: 144 MMOL/L (ref 136–145)
WBC # BLD AUTO: 11.2 K/UL (ref 4.8–10.8)

## 2023-10-11 PROCEDURE — 6370000000 HC RX 637 (ALT 250 FOR IP): Performed by: HOSPITALIST

## 2023-10-11 PROCEDURE — 82962 GLUCOSE BLOOD TEST: CPT

## 2023-10-11 PROCEDURE — 2000000000 HC ICU R&B

## 2023-10-11 PROCEDURE — 86900 BLOOD TYPING SEROLOGIC ABO: CPT

## 2023-10-11 PROCEDURE — 85025 COMPLETE CBC W/AUTO DIFF WBC: CPT

## 2023-10-11 PROCEDURE — P9016 RBC LEUKOCYTES REDUCED: HCPCS

## 2023-10-11 PROCEDURE — 85014 HEMATOCRIT: CPT

## 2023-10-11 PROCEDURE — 86901 BLOOD TYPING SEROLOGIC RH(D): CPT

## 2023-10-11 PROCEDURE — 36415 COLL VENOUS BLD VENIPUNCTURE: CPT

## 2023-10-11 PROCEDURE — 84132 ASSAY OF SERUM POTASSIUM: CPT

## 2023-10-11 PROCEDURE — 85018 HEMOGLOBIN: CPT

## 2023-10-11 PROCEDURE — 93010 ELECTROCARDIOGRAM REPORT: CPT | Performed by: INTERNAL MEDICINE

## 2023-10-11 PROCEDURE — 2500000003 HC RX 250 WO HCPCS: Performed by: INTERNAL MEDICINE

## 2023-10-11 PROCEDURE — 6360000002 HC RX W HCPCS: Performed by: HOSPITALIST

## 2023-10-11 PROCEDURE — 6370000000 HC RX 637 (ALT 250 FOR IP): Performed by: INTERNAL MEDICINE

## 2023-10-11 PROCEDURE — 71045 X-RAY EXAM CHEST 1 VIEW: CPT

## 2023-10-11 PROCEDURE — 83735 ASSAY OF MAGNESIUM: CPT

## 2023-10-11 PROCEDURE — 6360000002 HC RX W HCPCS

## 2023-10-11 PROCEDURE — 2580000003 HC RX 258: Performed by: INTERNAL MEDICINE

## 2023-10-11 PROCEDURE — 86850 RBC ANTIBODY SCREEN: CPT

## 2023-10-11 PROCEDURE — 99231 SBSQ HOSP IP/OBS SF/LOW 25: CPT | Performed by: SURGERY

## 2023-10-11 PROCEDURE — 6360000002 HC RX W HCPCS: Performed by: INTERNAL MEDICINE

## 2023-10-11 PROCEDURE — 86923 COMPATIBILITY TEST ELECTRIC: CPT

## 2023-10-11 PROCEDURE — 94760 N-INVAS EAR/PLS OXIMETRY 1: CPT

## 2023-10-11 PROCEDURE — 80048 BASIC METABOLIC PNL TOTAL CA: CPT

## 2023-10-11 RX ORDER — LORAZEPAM 2 MG/ML
0.5 INJECTION INTRAMUSCULAR EVERY 8 HOURS PRN
Status: DISCONTINUED | OUTPATIENT
Start: 2023-10-11 | End: 2023-10-11

## 2023-10-11 RX ORDER — POTASSIUM CHLORIDE 7.45 MG/ML
10 INJECTION INTRAVENOUS PRN
Status: DISCONTINUED | OUTPATIENT
Start: 2023-10-11 | End: 2023-10-22 | Stop reason: HOSPADM

## 2023-10-11 RX ORDER — MAGNESIUM SULFATE IN WATER 40 MG/ML
2000 INJECTION, SOLUTION INTRAVENOUS PRN
Status: DISCONTINUED | OUTPATIENT
Start: 2023-10-11 | End: 2023-10-22 | Stop reason: HOSPADM

## 2023-10-11 RX ORDER — ROSUVASTATIN CALCIUM 20 MG/1
20 TABLET, COATED ORAL NIGHTLY
Status: DISCONTINUED | OUTPATIENT
Start: 2023-10-11 | End: 2023-10-22 | Stop reason: HOSPADM

## 2023-10-11 RX ORDER — SERTRALINE HYDROCHLORIDE 100 MG/1
100 TABLET, FILM COATED ORAL 3 TIMES DAILY
COMMUNITY

## 2023-10-11 RX ORDER — ENOXAPARIN SODIUM 100 MG/ML
40 INJECTION SUBCUTANEOUS DAILY
Status: DISCONTINUED | OUTPATIENT
Start: 2023-10-12 | End: 2023-10-22 | Stop reason: HOSPADM

## 2023-10-11 RX ORDER — METHOCARBAMOL 750 MG/1
750 TABLET, FILM COATED ORAL 3 TIMES DAILY
Status: DISCONTINUED | OUTPATIENT
Start: 2023-10-11 | End: 2023-10-12

## 2023-10-11 RX ADMIN — LORAZEPAM 0.5 MG: 2 INJECTION INTRAMUSCULAR; INTRAVENOUS at 10:14

## 2023-10-11 RX ADMIN — ROSUVASTATIN CALCIUM 20 MG: 20 TABLET, COATED ORAL at 21:24

## 2023-10-11 RX ADMIN — METRONIDAZOLE 500 MG: 5 INJECTION, SOLUTION INTRAVENOUS at 19:45

## 2023-10-11 RX ADMIN — POTASSIUM CHLORIDE 10 MEQ: 10 INJECTION, SOLUTION INTRAVENOUS at 05:45

## 2023-10-11 RX ADMIN — CARVEDILOL 25 MG: 25 TABLET, FILM COATED ORAL at 16:08

## 2023-10-11 RX ADMIN — POTASSIUM CHLORIDE 10 MEQ: 10 INJECTION, SOLUTION INTRAVENOUS at 07:45

## 2023-10-11 RX ADMIN — HYDRALAZINE HYDROCHLORIDE 25 MG: 25 TABLET, FILM COATED ORAL at 22:06

## 2023-10-11 RX ADMIN — CLOPIDOGREL BISULFATE 75 MG: 75 TABLET ORAL at 08:16

## 2023-10-11 RX ADMIN — MAGNESIUM SULFATE HEPTAHYDRATE 2000 MG: 2 INJECTION, SOLUTION INTRAVENOUS at 06:45

## 2023-10-11 RX ADMIN — METRONIDAZOLE 500 MG: 5 INJECTION, SOLUTION INTRAVENOUS at 04:19

## 2023-10-11 RX ADMIN — HYDROCODONE BITARTRATE AND ACETAMINOPHEN 2 TABLET: 5; 325 TABLET ORAL at 01:39

## 2023-10-11 RX ADMIN — POTASSIUM CHLORIDE 10 MEQ: 10 INJECTION, SOLUTION INTRAVENOUS at 08:45

## 2023-10-11 RX ADMIN — ENOXAPARIN SODIUM 30 MG: 100 INJECTION SUBCUTANEOUS at 08:16

## 2023-10-11 RX ADMIN — HYDROMORPHONE HYDROCHLORIDE 0.5 MG: 1 INJECTION, SOLUTION INTRAMUSCULAR; INTRAVENOUS; SUBCUTANEOUS at 05:22

## 2023-10-11 RX ADMIN — CARVEDILOL 25 MG: 25 TABLET, FILM COATED ORAL at 08:15

## 2023-10-11 RX ADMIN — METOPROLOL TARTRATE 5 MG: 5 INJECTION INTRAVENOUS at 14:00

## 2023-10-11 RX ADMIN — MAGNESIUM SULFATE HEPTAHYDRATE 2000 MG: 2 INJECTION, SOLUTION INTRAVENOUS at 04:46

## 2023-10-11 RX ADMIN — POTASSIUM CHLORIDE 10 MEQ: 10 INJECTION, SOLUTION INTRAVENOUS at 06:44

## 2023-10-11 RX ADMIN — METOPROLOL TARTRATE 5 MG: 5 INJECTION INTRAVENOUS at 19:38

## 2023-10-11 RX ADMIN — POTASSIUM CHLORIDE 10 MEQ: 10 INJECTION, SOLUTION INTRAVENOUS at 13:02

## 2023-10-11 RX ADMIN — SODIUM CHLORIDE, PRESERVATIVE FREE 10 ML: 5 INJECTION INTRAVENOUS at 08:17

## 2023-10-11 RX ADMIN — ASPIRIN 81 MG: 81 TABLET, CHEWABLE ORAL at 08:16

## 2023-10-11 RX ADMIN — METOPROLOL TARTRATE 5 MG: 5 INJECTION INTRAVENOUS at 06:19

## 2023-10-11 RX ADMIN — POTASSIUM CHLORIDE 10 MEQ: 10 INJECTION, SOLUTION INTRAVENOUS at 12:00

## 2023-10-11 RX ADMIN — POTASSIUM CHLORIDE 10 MEQ: 10 INJECTION, SOLUTION INTRAVENOUS at 04:45

## 2023-10-11 RX ADMIN — SODIUM CHLORIDE, PRESERVATIVE FREE 10 ML: 5 INJECTION INTRAVENOUS at 21:30

## 2023-10-11 RX ADMIN — METRONIDAZOLE 500 MG: 5 INJECTION, SOLUTION INTRAVENOUS at 13:02

## 2023-10-11 RX ADMIN — METHOCARBAMOL 750 MG: 750 TABLET ORAL at 20:29

## 2023-10-11 RX ADMIN — AMLODIPINE BESYLATE 5 MG: 5 TABLET ORAL at 08:16

## 2023-10-11 RX ADMIN — METOPROLOL TARTRATE 5 MG: 5 INJECTION INTRAVENOUS at 01:36

## 2023-10-11 RX ADMIN — HYDROMORPHONE HYDROCHLORIDE 0.5 MG: 1 INJECTION, SOLUTION INTRAMUSCULAR; INTRAVENOUS; SUBCUTANEOUS at 08:16

## 2023-10-11 ASSESSMENT — PAIN DESCRIPTION - ORIENTATION
ORIENTATION: MID
ORIENTATION: MID
ORIENTATION: RIGHT;LEFT
ORIENTATION: MID

## 2023-10-11 ASSESSMENT — PAIN DESCRIPTION - DESCRIPTORS
DESCRIPTORS: CRAMPING
DESCRIPTORS: DISCOMFORT
DESCRIPTORS: ACHING

## 2023-10-11 ASSESSMENT — PAIN SCALES - GENERAL
PAINLEVEL_OUTOF10: 8
PAINLEVEL_OUTOF10: 6
PAINLEVEL_OUTOF10: 7
PAINLEVEL_OUTOF10: 5

## 2023-10-11 ASSESSMENT — PAIN DESCRIPTION - LOCATION
LOCATION: ABDOMEN
LOCATION: LEG

## 2023-10-11 ASSESSMENT — LIFESTYLE VARIABLES: SMOKING_STATUS: 1

## 2023-10-11 NOTE — PLAN OF CARE
Problem: Discharge Planning  Goal: Discharge to home or other facility with appropriate resources  10/11/2023 0850 by Rossi Terrell RN  Outcome: Progressing  10/11/2023 0626 by Diane Vera RN  Outcome: Progressing     Problem: Pain  Goal: Verbalizes/displays adequate comfort level or baseline comfort level  10/11/2023 0850 by Rossi Terrell RN  Outcome: Progressing  10/11/2023 0626 by Diane Vera RN  Outcome: Progressing     Problem: Safety - Adult  Goal: Free from fall injury  10/11/2023 0626 by Diane Vera RN  Outcome: Progressing     Problem: ABCDS Injury Assessment  Goal: Absence of physical injury  10/11/2023 0626 by Diane Vera RN  Outcome: Progressing     Problem: Nutrition Deficit:  Goal: Optimize nutritional status  10/11/2023 0626 by Diane Vera RN  Outcome: Progressing     Problem: Skin/Tissue Integrity  Goal: Absence of new skin breakdown  Description: 1. Monitor for areas of redness and/or skin breakdown  2. Assess vascular access sites hourly  3. Every 4-6 hours minimum:  Change oxygen saturation probe site  4. Every 4-6 hours:  If on nasal continuous positive airway pressure, respiratory therapy assess nares and determine need for appliance change or resting period.   10/11/2023 0626 by Diane Vera RN  Outcome: Progressing

## 2023-10-11 NOTE — ANESTHESIA PRE PROCEDURE
01:10 PM       CMP:   Lab Results   Component Value Date/Time     10/11/2023 02:27 AM     05/17/2012 01:10 PM    K 3.4 10/11/2023 11:38 AM    K 2.6 10/11/2023 02:27 AM    CL 96 10/11/2023 02:27 AM     05/17/2012 01:10 PM    CO2 33 10/11/2023 02:27 AM    BUN 11 10/11/2023 02:27 AM    CREATININE 0.9 10/11/2023 02:27 AM    CREATININE 0.7 05/17/2012 01:10 PM    GFRAA 49 07/07/2022 08:47 AM    LABGLOM >60 10/11/2023 02:27 AM    GLUCOSE 83 10/11/2023 02:27 AM    PROT 6.0 10/09/2023 01:43 AM    PROT 7.9 08/20/2012 10:50 AM    CALCIUM 7.6 10/11/2023 02:27 AM    BILITOT <0.2 10/09/2023 01:43 AM    ALKPHOS 63 10/09/2023 01:43 AM    ALKPHOS 97 05/17/2012 01:10 PM    AST 15 10/09/2023 01:43 AM    ALT 7 10/09/2023 01:43 AM       POC Tests:   Recent Labs     10/11/23  1300   POCGLU 92       Coags:   Lab Results   Component Value Date/Time    PROTIME 14.9 10/07/2023 12:05 PM    INR 1.20 10/07/2023 12:05 PM    APTT 31.8 10/07/2023 12:05 PM       HCG (If Applicable): No results found for: \"PREGTESTUR\", \"PREGSERUM\", \"HCG\", \"HCGQUANT\"     ABGs: No results found for: \"PHART\", \"PO2ART\", \"UKM3IQK\", \"SXR1OTV\", \"BEART\", \"M5LNLEIY\"     Type & Screen (If Applicable):  No results found for: \"LABABO\", \"LABRH\"    Anesthesia Evaluation  Patient summary reviewed and Nursing notes reviewed   history of anesthetic complications: PONV. Airway: Mallampati: II  TM distance: >3 FB   Neck ROM: full  Mouth opening: > = 3 FB   Dental: normal exam   (+) upper dentures and lower dentures      Pulmonary:normal exam    (+) COPD:  current smoker                           Cardiovascular:    (+) hypertension:, past MI: < 1 month, CABG/stent:, hyperlipidemia      ECG reviewed  Rhythm: regular    Echocardiogram reviewed         Beta Blocker:  Dose within 24 Hrs      ROS comment: Stent placed 10/10 to left main artery   Severe left main, single-vessel disease involving a smaller codominant   RCA. Normal LV systolic function.    Successful

## 2023-10-12 ENCOUNTER — ANESTHESIA (OUTPATIENT)
Dept: OPERATING ROOM | Age: 73
End: 2023-10-12
Payer: MEDICARE

## 2023-10-12 PROBLEM — Z51.5 PALLIATIVE CARE PATIENT: Status: ACTIVE | Noted: 2023-10-12

## 2023-10-12 LAB
ABO/RH: NORMAL
ANION GAP SERPL CALCULATED.3IONS-SCNC: 16 MMOL/L (ref 7–19)
ANISOCYTOSIS BLD QL SMEAR: ABNORMAL
ANTIBODY SCREEN: NORMAL
BACTERIA BLD CULT ORG #2: NORMAL
BACTERIA BLD CULT: NORMAL
BASOPHILS # BLD: 0.1 K/UL (ref 0–0.2)
BASOPHILS NFR BLD: 0.4 % (ref 0–1)
BLOOD BANK DISPENSE STATUS: NORMAL
BLOOD BANK DISPENSE STATUS: NORMAL
BLOOD BANK PRODUCT CODE: NORMAL
BLOOD BANK PRODUCT CODE: NORMAL
BPU ID: NORMAL
BPU ID: NORMAL
BUN SERPL-MCNC: 8 MG/DL (ref 8–23)
CALCIUM SERPL-MCNC: 8 MG/DL (ref 8.8–10.2)
CHLORIDE SERPL-SCNC: 96 MMOL/L (ref 98–111)
CO2 SERPL-SCNC: 27 MMOL/L (ref 22–29)
CREAT SERPL-MCNC: 0.8 MG/DL (ref 0.5–0.9)
DESCRIPTION BLOOD BANK: NORMAL
DESCRIPTION BLOOD BANK: NORMAL
EOSINOPHIL # BLD: 0.1 K/UL (ref 0–0.6)
EOSINOPHIL NFR BLD: 0.5 % (ref 0–5)
ERYTHROCYTE [DISTWIDTH] IN BLOOD BY AUTOMATED COUNT: 18.4 % (ref 11.5–14.5)
GLUCOSE BLD-MCNC: 124 MG/DL (ref 70–99)
GLUCOSE BLD-MCNC: 212 MG/DL (ref 70–99)
GLUCOSE SERPL-MCNC: 91 MG/DL (ref 74–109)
HCT VFR BLD AUTO: 20.9 % (ref 37–47)
HCT VFR BLD AUTO: 28.1 % (ref 37–47)
HCT VFR BLD AUTO: 29.2 % (ref 37–47)
HGB BLD-MCNC: 6.2 G/DL (ref 12–16)
HGB BLD-MCNC: 8.3 G/DL (ref 12–16)
HGB BLD-MCNC: 8.5 G/DL (ref 12–16)
HYPOCHROMIA BLD QL SMEAR: ABNORMAL
IMM GRANULOCYTES # BLD: 0.1 K/UL
LYMPHOCYTES # BLD: 2.2 K/UL (ref 1.1–4.5)
LYMPHOCYTES NFR BLD: 12.4 % (ref 20–40)
MCH RBC QN AUTO: 24.4 PG (ref 27–31)
MCHC RBC AUTO-ENTMCNC: 28.4 G/DL (ref 33–37)
MCV RBC AUTO: 85.9 FL (ref 81–99)
MONOCYTES # BLD: 1 K/UL (ref 0–0.9)
MONOCYTES NFR BLD: 5.5 % (ref 0–10)
NEUTROPHILS # BLD: 14 K/UL (ref 1.5–7.5)
NEUTS SEG NFR BLD: 80.6 % (ref 50–65)
PERFORMED ON: ABNORMAL
PERFORMED ON: ABNORMAL
PLATELET # BLD AUTO: 310 K/UL (ref 130–400)
PLATELET SLIDE REVIEW: ADEQUATE
PMV BLD AUTO: 10.5 FL (ref 9.4–12.3)
POLYCHROMASIA BLD QL SMEAR: ABNORMAL
POTASSIUM SERPL-SCNC: 3.7 MMOL/L (ref 3.5–5)
RBC # BLD AUTO: 3.4 M/UL (ref 4.2–5.4)
SODIUM SERPL-SCNC: 139 MMOL/L (ref 136–145)
WBC # BLD AUTO: 17.3 K/UL (ref 4.8–10.8)

## 2023-10-12 PROCEDURE — 0DNU0ZZ RELEASE OMENTUM, OPEN APPROACH: ICD-10-PCS | Performed by: SURGERY

## 2023-10-12 PROCEDURE — 6360000002 HC RX W HCPCS: Performed by: INTERNAL MEDICINE

## 2023-10-12 PROCEDURE — 94760 N-INVAS EAR/PLS OXIMETRY 1: CPT

## 2023-10-12 PROCEDURE — 3600000014 HC SURGERY LEVEL 4 ADDTL 15MIN: Performed by: SURGERY

## 2023-10-12 PROCEDURE — 85025 COMPLETE CBC W/AUTO DIFF WBC: CPT

## 2023-10-12 PROCEDURE — 30233N1 TRANSFUSION OF NONAUTOLOGOUS RED BLOOD CELLS INTO PERIPHERAL VEIN, PERCUTANEOUS APPROACH: ICD-10-PCS | Performed by: SURGERY

## 2023-10-12 PROCEDURE — 82962 GLUCOSE BLOOD TEST: CPT

## 2023-10-12 PROCEDURE — 2580000003 HC RX 258: Performed by: SURGERY

## 2023-10-12 PROCEDURE — 3700000000 HC ANESTHESIA ATTENDED CARE: Performed by: SURGERY

## 2023-10-12 PROCEDURE — 2709999900 HC NON-CHARGEABLE SUPPLY: Performed by: SURGERY

## 2023-10-12 PROCEDURE — 2500000003 HC RX 250 WO HCPCS: Performed by: INTERNAL MEDICINE

## 2023-10-12 PROCEDURE — 6360000002 HC RX W HCPCS

## 2023-10-12 PROCEDURE — 36620 INSERTION CATHETER ARTERY: CPT

## 2023-10-12 PROCEDURE — 6360000002 HC RX W HCPCS: Performed by: ANESTHESIOLOGY

## 2023-10-12 PROCEDURE — 6360000002 HC RX W HCPCS: Performed by: NURSE ANESTHETIST, CERTIFIED REGISTERED

## 2023-10-12 PROCEDURE — 2700000000 HC OXYGEN THERAPY PER DAY

## 2023-10-12 PROCEDURE — 2000000000 HC ICU R&B

## 2023-10-12 PROCEDURE — 6360000002 HC RX W HCPCS: Performed by: SURGERY

## 2023-10-12 PROCEDURE — 80048 BASIC METABOLIC PNL TOTAL CA: CPT

## 2023-10-12 PROCEDURE — 6370000000 HC RX 637 (ALT 250 FOR IP): Performed by: HOSPITALIST

## 2023-10-12 PROCEDURE — 6370000000 HC RX 637 (ALT 250 FOR IP): Performed by: INTERNAL MEDICINE

## 2023-10-12 PROCEDURE — 36415 COLL VENOUS BLD VENIPUNCTURE: CPT

## 2023-10-12 PROCEDURE — 7100000001 HC PACU RECOVERY - ADDTL 15 MIN: Performed by: SURGERY

## 2023-10-12 PROCEDURE — C1765 ADHESION BARRIER: HCPCS | Performed by: SURGERY

## 2023-10-12 PROCEDURE — 3600000004 HC SURGERY LEVEL 4 BASE: Performed by: SURGERY

## 2023-10-12 PROCEDURE — 6370000000 HC RX 637 (ALT 250 FOR IP): Performed by: SURGERY

## 2023-10-12 PROCEDURE — 3700000001 HC ADD 15 MINUTES (ANESTHESIA): Performed by: SURGERY

## 2023-10-12 PROCEDURE — 2580000003 HC RX 258: Performed by: NURSE ANESTHETIST, CERTIFIED REGISTERED

## 2023-10-12 PROCEDURE — 6360000002 HC RX W HCPCS: Performed by: HOSPITALIST

## 2023-10-12 PROCEDURE — 0DN80ZZ RELEASE SMALL INTESTINE, OPEN APPROACH: ICD-10-PCS | Performed by: SURGERY

## 2023-10-12 PROCEDURE — 99232 SBSQ HOSP IP/OBS MODERATE 35: CPT | Performed by: INTERNAL MEDICINE

## 2023-10-12 PROCEDURE — 7100000000 HC PACU RECOVERY - FIRST 15 MIN: Performed by: SURGERY

## 2023-10-12 PROCEDURE — 2500000003 HC RX 250 WO HCPCS: Performed by: NURSE ANESTHETIST, CERTIFIED REGISTERED

## 2023-10-12 PROCEDURE — 44005 FREEING OF BOWEL ADHESION: CPT | Performed by: SURGERY

## 2023-10-12 PROCEDURE — 85014 HEMATOCRIT: CPT

## 2023-10-12 PROCEDURE — 2500000003 HC RX 250 WO HCPCS: Performed by: SURGERY

## 2023-10-12 PROCEDURE — P9045 ALBUMIN (HUMAN), 5%, 250 ML: HCPCS | Performed by: NURSE ANESTHETIST, CERTIFIED REGISTERED

## 2023-10-12 PROCEDURE — 85018 HEMOGLOBIN: CPT

## 2023-10-12 PROCEDURE — 2720000010 HC SURG SUPPLY STERILE: Performed by: SURGERY

## 2023-10-12 DEVICE — BARRIER, ABSORBABLE, ADHESION
Type: IMPLANTABLE DEVICE | Site: ABDOMEN | Status: FUNCTIONAL
Brand: SEPRAFILM®

## 2023-10-12 RX ORDER — HYDROMORPHONE HYDROCHLORIDE 1 MG/ML
0.25 INJECTION, SOLUTION INTRAMUSCULAR; INTRAVENOUS; SUBCUTANEOUS EVERY 5 MIN PRN
Status: DISCONTINUED | OUTPATIENT
Start: 2023-10-12 | End: 2023-10-12 | Stop reason: HOSPADM

## 2023-10-12 RX ORDER — CALCIUM CHLORIDE 100 MG/ML
INJECTION INTRAVENOUS; INTRAVENTRICULAR PRN
Status: DISCONTINUED | OUTPATIENT
Start: 2023-10-12 | End: 2023-10-12 | Stop reason: SDUPTHER

## 2023-10-12 RX ORDER — SODIUM CHLORIDE, SODIUM LACTATE, POTASSIUM CHLORIDE, CALCIUM CHLORIDE 600; 310; 30; 20 MG/100ML; MG/100ML; MG/100ML; MG/100ML
INJECTION, SOLUTION INTRAVENOUS CONTINUOUS PRN
Status: DISCONTINUED | OUTPATIENT
Start: 2023-10-12 | End: 2023-10-12 | Stop reason: SDUPTHER

## 2023-10-12 RX ORDER — HYDROMORPHONE HYDROCHLORIDE 1 MG/ML
0.5 INJECTION, SOLUTION INTRAMUSCULAR; INTRAVENOUS; SUBCUTANEOUS EVERY 5 MIN PRN
Status: DISCONTINUED | OUTPATIENT
Start: 2023-10-12 | End: 2023-10-12 | Stop reason: HOSPADM

## 2023-10-12 RX ORDER — SODIUM CHLORIDE, SODIUM LACTATE, POTASSIUM CHLORIDE, CALCIUM CHLORIDE 600; 310; 30; 20 MG/100ML; MG/100ML; MG/100ML; MG/100ML
INJECTION, SOLUTION INTRAVENOUS CONTINUOUS
Status: DISCONTINUED | OUTPATIENT
Start: 2023-10-12 | End: 2023-10-22 | Stop reason: HOSPADM

## 2023-10-12 RX ORDER — PROCHLORPERAZINE EDISYLATE 5 MG/ML
5 INJECTION INTRAMUSCULAR; INTRAVENOUS
Status: DISCONTINUED | OUTPATIENT
Start: 2023-10-12 | End: 2023-10-12 | Stop reason: HOSPADM

## 2023-10-12 RX ORDER — HYDRALAZINE HYDROCHLORIDE 20 MG/ML
10 INJECTION INTRAMUSCULAR; INTRAVENOUS
Status: DISCONTINUED | OUTPATIENT
Start: 2023-10-12 | End: 2023-10-12 | Stop reason: HOSPADM

## 2023-10-12 RX ORDER — LABETALOL HYDROCHLORIDE 5 MG/ML
10 INJECTION, SOLUTION INTRAVENOUS
Status: DISCONTINUED | OUTPATIENT
Start: 2023-10-12 | End: 2023-10-12 | Stop reason: HOSPADM

## 2023-10-12 RX ORDER — SODIUM CHLORIDE 9 MG/ML
INJECTION, SOLUTION INTRAVENOUS PRN
Status: DISCONTINUED | OUTPATIENT
Start: 2023-10-12 | End: 2023-10-12 | Stop reason: HOSPADM

## 2023-10-12 RX ORDER — EPHEDRINE SULFATE 50 MG/ML
INJECTION, SOLUTION INTRAVENOUS PRN
Status: DISCONTINUED | OUTPATIENT
Start: 2023-10-12 | End: 2023-10-12 | Stop reason: SDUPTHER

## 2023-10-12 RX ORDER — ROCURONIUM BROMIDE 10 MG/ML
INJECTION, SOLUTION INTRAVENOUS PRN
Status: DISCONTINUED | OUTPATIENT
Start: 2023-10-12 | End: 2023-10-12 | Stop reason: SDUPTHER

## 2023-10-12 RX ORDER — DIPHENHYDRAMINE HYDROCHLORIDE 50 MG/ML
12.5 INJECTION INTRAMUSCULAR; INTRAVENOUS
Status: DISCONTINUED | OUTPATIENT
Start: 2023-10-12 | End: 2023-10-12 | Stop reason: HOSPADM

## 2023-10-12 RX ORDER — ONDANSETRON 2 MG/ML
INJECTION INTRAMUSCULAR; INTRAVENOUS PRN
Status: DISCONTINUED | OUTPATIENT
Start: 2023-10-12 | End: 2023-10-12 | Stop reason: SDUPTHER

## 2023-10-12 RX ORDER — SODIUM CHLORIDE 0.9 % (FLUSH) 0.9 %
5-40 SYRINGE (ML) INJECTION EVERY 12 HOURS SCHEDULED
Status: DISCONTINUED | OUTPATIENT
Start: 2023-10-12 | End: 2023-10-12 | Stop reason: HOSPADM

## 2023-10-12 RX ORDER — PROPOFOL 10 MG/ML
INJECTION, EMULSION INTRAVENOUS PRN
Status: DISCONTINUED | OUTPATIENT
Start: 2023-10-12 | End: 2023-10-12 | Stop reason: SDUPTHER

## 2023-10-12 RX ORDER — SODIUM CHLORIDE 0.9 % (FLUSH) 0.9 %
5-40 SYRINGE (ML) INJECTION PRN
Status: DISCONTINUED | OUTPATIENT
Start: 2023-10-12 | End: 2023-10-12 | Stop reason: HOSPADM

## 2023-10-12 RX ORDER — SODIUM CHLORIDE 9 MG/ML
INJECTION, SOLUTION INTRAVENOUS CONTINUOUS PRN
Status: DISCONTINUED | OUTPATIENT
Start: 2023-10-12 | End: 2023-10-12 | Stop reason: SDUPTHER

## 2023-10-12 RX ORDER — ALBUMIN, HUMAN INJ 5% 5 %
SOLUTION INTRAVENOUS PRN
Status: DISCONTINUED | OUTPATIENT
Start: 2023-10-12 | End: 2023-10-12 | Stop reason: SDUPTHER

## 2023-10-12 RX ORDER — HYDROMORPHONE HYDROCHLORIDE 1 MG/ML
0.5 INJECTION, SOLUTION INTRAMUSCULAR; INTRAVENOUS; SUBCUTANEOUS
Status: DISCONTINUED | OUTPATIENT
Start: 2023-10-12 | End: 2023-10-16

## 2023-10-12 RX ORDER — IPRATROPIUM BROMIDE AND ALBUTEROL SULFATE 2.5; .5 MG/3ML; MG/3ML
1 SOLUTION RESPIRATORY (INHALATION)
Status: DISCONTINUED | OUTPATIENT
Start: 2023-10-12 | End: 2023-10-12 | Stop reason: HOSPADM

## 2023-10-12 RX ORDER — DEXAMETHASONE SODIUM PHOSPHATE 4 MG/ML
INJECTION, SOLUTION INTRA-ARTICULAR; INTRALESIONAL; INTRAMUSCULAR; INTRAVENOUS; SOFT TISSUE PRN
Status: DISCONTINUED | OUTPATIENT
Start: 2023-10-12 | End: 2023-10-12 | Stop reason: SDUPTHER

## 2023-10-12 RX ORDER — SUCCINYLCHOLINE/SOD CL,ISO/PF 100 MG/5ML
SYRINGE (ML) INTRAVENOUS PRN
Status: DISCONTINUED | OUTPATIENT
Start: 2023-10-12 | End: 2023-10-12 | Stop reason: SDUPTHER

## 2023-10-12 RX ORDER — SODIUM CHLORIDE 9 MG/ML
INJECTION, SOLUTION INTRAVENOUS PRN
Status: DISCONTINUED | OUTPATIENT
Start: 2023-10-12 | End: 2023-10-22 | Stop reason: HOSPADM

## 2023-10-12 RX ORDER — FENTANYL CITRATE 50 UG/ML
INJECTION, SOLUTION INTRAMUSCULAR; INTRAVENOUS PRN
Status: DISCONTINUED | OUTPATIENT
Start: 2023-10-12 | End: 2023-10-12 | Stop reason: SDUPTHER

## 2023-10-12 RX ADMIN — METRONIDAZOLE 500 MG: 5 INJECTION, SOLUTION INTRAVENOUS at 19:35

## 2023-10-12 RX ADMIN — PHENYLEPHRINE HYDROCHLORIDE 100 MCG: 10 INJECTION INTRAVENOUS at 12:56

## 2023-10-12 RX ADMIN — PHENYLEPHRINE HYDROCHLORIDE 150 MCG: 10 INJECTION INTRAVENOUS at 12:48

## 2023-10-12 RX ADMIN — EPHEDRINE SULFATE 10 MG: 50 INJECTION INTRAMUSCULAR; INTRAVENOUS; SUBCUTANEOUS at 12:48

## 2023-10-12 RX ADMIN — PHENYLEPHRINE HYDROCHLORIDE 50 MCG/MIN: 10 INJECTION INTRAVENOUS at 12:56

## 2023-10-12 RX ADMIN — HYDROMORPHONE HYDROCHLORIDE 0.5 MG: 1 INJECTION, SOLUTION INTRAMUSCULAR; INTRAVENOUS; SUBCUTANEOUS at 17:13

## 2023-10-12 RX ADMIN — ROSUVASTATIN CALCIUM 20 MG: 20 TABLET, COATED ORAL at 21:11

## 2023-10-12 RX ADMIN — ROCURONIUM BROMIDE 5 MG: 10 INJECTION, SOLUTION INTRAVENOUS at 12:45

## 2023-10-12 RX ADMIN — FENTANYL CITRATE 50 MCG: 0.05 INJECTION, SOLUTION INTRAMUSCULAR; INTRAVENOUS at 16:38

## 2023-10-12 RX ADMIN — HYDROCODONE BITARTRATE AND ACETAMINOPHEN 2 TABLET: 5; 325 TABLET ORAL at 02:20

## 2023-10-12 RX ADMIN — ASPIRIN 81 MG: 81 TABLET, CHEWABLE ORAL at 17:50

## 2023-10-12 RX ADMIN — SODIUM CHLORIDE, SODIUM LACTATE, POTASSIUM CHLORIDE, AND CALCIUM CHLORIDE: 600; 310; 30; 20 INJECTION, SOLUTION INTRAVENOUS at 12:35

## 2023-10-12 RX ADMIN — HYDROMORPHONE HYDROCHLORIDE 0.5 MG: 1 INJECTION, SOLUTION INTRAMUSCULAR; INTRAVENOUS; SUBCUTANEOUS at 21:12

## 2023-10-12 RX ADMIN — FENTANYL CITRATE 50 MCG: 0.05 INJECTION, SOLUTION INTRAMUSCULAR; INTRAVENOUS at 13:11

## 2023-10-12 RX ADMIN — HYDRALAZINE HYDROCHLORIDE 5 MG: 20 INJECTION INTRAMUSCULAR; INTRAVENOUS at 03:39

## 2023-10-12 RX ADMIN — ALBUMIN (HUMAN) 250 ML: 12.5 INJECTION, SOLUTION INTRAVENOUS at 13:42

## 2023-10-12 RX ADMIN — SODIUM CHLORIDE, SODIUM LACTATE, POTASSIUM CHLORIDE, CALCIUM CHLORIDE: 600; 310; 30; 20 INJECTION, SOLUTION INTRAVENOUS at 15:32

## 2023-10-12 RX ADMIN — ONDANSETRON 4 MG: 2 INJECTION INTRAMUSCULAR; INTRAVENOUS at 04:49

## 2023-10-12 RX ADMIN — HYDROMORPHONE HYDROCHLORIDE 0.5 MG: 1 INJECTION, SOLUTION INTRAMUSCULAR; INTRAVENOUS; SUBCUTANEOUS at 17:54

## 2023-10-12 RX ADMIN — METOPROLOL TARTRATE 5 MG: 5 INJECTION INTRAVENOUS at 19:35

## 2023-10-12 RX ADMIN — HYDROMORPHONE HYDROCHLORIDE 0.5 MG: 1 INJECTION, SOLUTION INTRAMUSCULAR; INTRAVENOUS; SUBCUTANEOUS at 08:50

## 2023-10-12 RX ADMIN — EPHEDRINE SULFATE 10 MG: 50 INJECTION INTRAMUSCULAR; INTRAVENOUS; SUBCUTANEOUS at 13:36

## 2023-10-12 RX ADMIN — CLOPIDOGREL BISULFATE 75 MG: 75 TABLET ORAL at 17:50

## 2023-10-12 RX ADMIN — ONDANSETRON 4 MG: 2 INJECTION INTRAMUSCULAR; INTRAVENOUS at 16:15

## 2023-10-12 RX ADMIN — METOPROLOL TARTRATE 5 MG: 5 INJECTION INTRAVENOUS at 01:45

## 2023-10-12 RX ADMIN — SODIUM CHLORIDE, SODIUM LACTATE, POTASSIUM CHLORIDE, AND CALCIUM CHLORIDE: 600; 310; 30; 20 INJECTION, SOLUTION INTRAVENOUS at 14:06

## 2023-10-12 RX ADMIN — SODIUM CHLORIDE: 9 INJECTION, SOLUTION INTRAVENOUS at 12:34

## 2023-10-12 RX ADMIN — ROCURONIUM BROMIDE 20 MG: 10 INJECTION, SOLUTION INTRAVENOUS at 14:15

## 2023-10-12 RX ADMIN — HYDRALAZINE HYDROCHLORIDE 25 MG: 25 TABLET, FILM COATED ORAL at 06:23

## 2023-10-12 RX ADMIN — FENTANYL CITRATE 25 MCG: 0.05 INJECTION, SOLUTION INTRAMUSCULAR; INTRAVENOUS at 14:23

## 2023-10-12 RX ADMIN — HYDROMORPHONE HYDROCHLORIDE 0.5 MG: 1 INJECTION, SOLUTION INTRAMUSCULAR; INTRAVENOUS; SUBCUTANEOUS at 11:08

## 2023-10-12 RX ADMIN — ROCURONIUM BROMIDE 25 MG: 10 INJECTION, SOLUTION INTRAVENOUS at 13:05

## 2023-10-12 RX ADMIN — SODIUM CHLORIDE: 9 INJECTION, SOLUTION INTRAVENOUS at 14:16

## 2023-10-12 RX ADMIN — METRONIDAZOLE 500 MG: 5 INJECTION, SOLUTION INTRAVENOUS at 03:39

## 2023-10-12 RX ADMIN — ROCURONIUM BROMIDE 10 MG: 10 INJECTION, SOLUTION INTRAVENOUS at 15:17

## 2023-10-12 RX ADMIN — HYDROMORPHONE HYDROCHLORIDE 0.5 MG: 1 INJECTION, SOLUTION INTRAMUSCULAR; INTRAVENOUS; SUBCUTANEOUS at 23:20

## 2023-10-12 RX ADMIN — WATER 2000 MG: 1 INJECTION INTRAMUSCULAR; INTRAVENOUS; SUBCUTANEOUS at 21:11

## 2023-10-12 RX ADMIN — ROCURONIUM BROMIDE 10 MG: 10 INJECTION, SOLUTION INTRAVENOUS at 14:46

## 2023-10-12 RX ADMIN — PROPOFOL 150 MG: 10 INJECTION, EMULSION INTRAVENOUS at 12:45

## 2023-10-12 RX ADMIN — Medication 120 MG: at 12:45

## 2023-10-12 RX ADMIN — HYDROMORPHONE HYDROCHLORIDE 0.5 MG: 1 INJECTION, SOLUTION INTRAMUSCULAR; INTRAVENOUS; SUBCUTANEOUS at 16:10

## 2023-10-12 RX ADMIN — HYDROMORPHONE HYDROCHLORIDE 0.5 MG: 1 INJECTION, SOLUTION INTRAMUSCULAR; INTRAVENOUS; SUBCUTANEOUS at 17:07

## 2023-10-12 RX ADMIN — CEFAZOLIN 2000 MG: 2 INJECTION, POWDER, FOR SOLUTION INTRAMUSCULAR; INTRAVENOUS at 12:52

## 2023-10-12 RX ADMIN — ROCURONIUM BROMIDE 10 MG: 10 INJECTION, SOLUTION INTRAVENOUS at 13:44

## 2023-10-12 RX ADMIN — CARVEDILOL 25 MG: 25 TABLET, FILM COATED ORAL at 17:50

## 2023-10-12 RX ADMIN — HYDROMORPHONE HYDROCHLORIDE 0.5 MG: 1 INJECTION, SOLUTION INTRAMUSCULAR; INTRAVENOUS; SUBCUTANEOUS at 16:44

## 2023-10-12 RX ADMIN — FENTANYL CITRATE 100 MCG: 0.05 INJECTION, SOLUTION INTRAMUSCULAR; INTRAVENOUS at 15:51

## 2023-10-12 RX ADMIN — SODIUM CHLORIDE: 9 INJECTION, SOLUTION INTRAVENOUS at 16:10

## 2023-10-12 RX ADMIN — DEXAMETHASONE SODIUM PHOSPHATE 4 MG: 4 INJECTION, SOLUTION INTRAMUSCULAR; INTRAVENOUS at 13:09

## 2023-10-12 RX ADMIN — SODIUM CHLORIDE, PRESERVATIVE FREE 10 ML: 5 INJECTION INTRAVENOUS at 21:45

## 2023-10-12 RX ADMIN — CALCIUM CHLORIDE 1 G: 100 INJECTION, SOLUTION INTRAVENOUS at 14:48

## 2023-10-12 RX ADMIN — ALBUMIN (HUMAN) 250 ML: 12.5 INJECTION, SOLUTION INTRAVENOUS at 15:30

## 2023-10-12 RX ADMIN — FENTANYL CITRATE 25 MCG: 0.05 INJECTION, SOLUTION INTRAMUSCULAR; INTRAVENOUS at 12:42

## 2023-10-12 RX ADMIN — SODIUM CHLORIDE, SODIUM LACTATE, POTASSIUM CHLORIDE, AND CALCIUM CHLORIDE: 600; 310; 30; 20 INJECTION, SOLUTION INTRAVENOUS at 17:49

## 2023-10-12 RX ADMIN — SODIUM CHLORIDE, SODIUM LACTATE, POTASSIUM CHLORIDE, AND CALCIUM CHLORIDE: 600; 310; 30; 20 INJECTION, SOLUTION INTRAVENOUS at 15:30

## 2023-10-12 ASSESSMENT — PAIN DESCRIPTION - DESCRIPTORS
DESCRIPTORS: BURNING
DESCRIPTORS: DISCOMFORT
DESCRIPTORS: BURNING;DISCOMFORT
DESCRIPTORS: DISCOMFORT
DESCRIPTORS: ACHING;CRAMPING
DESCRIPTORS: ACHING
DESCRIPTORS: ACHING

## 2023-10-12 ASSESSMENT — PAIN DESCRIPTION - ORIENTATION
ORIENTATION: MID
ORIENTATION: RIGHT
ORIENTATION: MID
ORIENTATION: MID

## 2023-10-12 ASSESSMENT — PAIN SCALES - GENERAL
PAINLEVEL_OUTOF10: 9
PAINLEVEL_OUTOF10: 4
PAINLEVEL_OUTOF10: 7
PAINLEVEL_OUTOF10: 7
PAINLEVEL_OUTOF10: 9
PAINLEVEL_OUTOF10: 7
PAINLEVEL_OUTOF10: 7
PAINLEVEL_OUTOF10: 8

## 2023-10-12 ASSESSMENT — PAIN DESCRIPTION - LOCATION
LOCATION: ABDOMEN
LOCATION: ABDOMEN
LOCATION: BACK;KNEE;ABDOMEN
LOCATION: ABDOMEN
LOCATION: KNEE
LOCATION: KNEE;BACK;ABDOMEN
LOCATION: ABDOMEN
LOCATION: ABDOMEN

## 2023-10-12 NOTE — ANESTHESIA POSTPROCEDURE EVALUATION
Department of Anesthesiology  Postprocedure Note    Patient: Princess Stevenson  MRN: [id number]  YOB: 1950  Date of evaluation: 10/12/2023      Procedure Summary     Date: 10/12/23 Room / Location: [address]    Anesthesia Start: 1234 Anesthesia Stop:     Procedure: REPAIR OF ENTEROTOMY, LYSIS OF ADHESIONS Diagnosis:       Small bowel obstruction ([address])      (Small bowel obstruction ([address]) [Y33.477])    Surgeons: Naomy Weber MD Responsible Provider: MANDIE Abdi CRNA    Anesthesia Type: general ASA Status: 4 - Emergent          Anesthesia Type: No value filed.     Vera Phase I: Vera Score: 10    Vera Phase II:        Anesthesia Post Evaluation    Patient location during evaluation: PACU  Patient participation: complete - patient participated  Level of consciousness: awake  Pain score: 0  Airway patency: patent  Nausea & Vomiting: no nausea and no vomiting  Complications: no  Cardiovascular status: hemodynamically stable  Respiratory status: acceptable, face mask and spontaneous ventilation  Hydration status: euvolemic  Pain management: adequate

## 2023-10-12 NOTE — PLAN OF CARE
Problem: Nutrition Deficit:  Goal: Optimize nutritional status  Outcome: Not Progressing  Flowsheets (Taken 10/12/2023 0901)  Nutrient intake appropriate for improving, restoring, or maintaining nutritional needs: Assess nutritional status and recommend course of action

## 2023-10-12 NOTE — CONSULTS
Palliative Care:   Initiated for support, advance care planning and goals of care. New to Palliative care, explained our role and  provided support. Pt's  Horacio Nolasco is at bedside and helps with information. Pt is having some pain, has been medicated and has an NG tube in place. Pt has a small bowel obstruction and will be going for surgery later this morning. Past Medical History:        Past Medical History:   Diagnosis Date    Anxiety     Arthritis     Back pain of lumbar region with sciatica     Chronic kidney disease     Disease of blood and blood forming organ     Hyperlipidemia     Hypertension     Movement disorder     Other disorders of kidney and ureter in diseases classified elsewhere     Palliative care patient 10/12/2023    Panic attack     PONV (postoperative nausea and vomiting)        Advance Directives:    Full code  Pt's  says they have a living will, never finalized. He will bring in and we will assist with notarization. Pain/Other Symptoms:      positive for abdominal pain. Treated with PRN pain medication. Psychological/Spiritual:     Good spiritual support from [facility]. Plan:    to surgery today, continue medical treatments, manage symptoms and monitor for improvement. Patient/family discussion r/t goals:           Goal will be to return home with her spouse. Pt is independent at home, uses no DME. Palliative Performance Scale:     70     Reviewed Palliative Care role and will continue to follow and support.               Electronically signed by Stone Jansen RN on 10/12/2023 at 10:39 AM

## 2023-10-12 NOTE — ACP (ADVANCE CARE PLANNING)
Advance Care Planning     Advance Care Planning Activator (Inpatient)  Conversation Note      Date of ACP Conversation: 10/12/2023     Conversation Conducted with: Patient with Decision Making Capacity: Jing Miranda  Her  Lalita See is present    ACP Activator: Amanda Gamble RN      Health Care Decision Maker:     Current Designated Health Care Decision Maker:     Primary Decision Maker: [facility] - [phone]    Secondary Decision Maker: [facility] - [phone]      Care Preferences    Ventilation: \"If you were in your present state of health and suddenly became very ill and were unable to breathe on your own, what would your preference be about the use of a ventilator (breathing machine) if it were available to you? \"      Would the patient desire the use of ventilator (breathing machine)?:   YES          Resuscitation  \"CPR works best to restart the heart when there is a sudden event, like a heart attack, in someone who is otherwise healthy. Unfortunately, CPR does not typically restart the heart for people who have serious health conditions or who are very sick. \"    \"In the event your heart stopped as a result of an underlying serious health condition, would you want attempts to be made to restart your heart (answer \"yes\" for attempt to resuscitate) or would you prefer a natural death (answer \"no\" for do not attempt to resuscitate)? \"   YES          Conversation Outcomes:  ACP discussion completed    Follow-up plan:    [x] Schedule follow-up conversation to continue planning. Pt has a living will that was never completed and will bring in so we can help execute and finalize.

## 2023-10-12 NOTE — BRIEF OP NOTE
Brief Postoperative Note      Patient: Harsha Granados  YOB: 1950  MRN: [id number]    Date of Procedure: 10/12/2023    Pre-Op Diagnosis Codes:     * Small bowel obstruction ([address]) [I01.178]    Post-Op Diagnosis:   1. Small Bowel Obstruction Secondary to Extensive Intra-abdominal Adhesions             2.  Incidental Enterotomy                  Procedure:  1. Exploratory Laparotomy with Lysis of Adhesions          2. Repair of Incidental Enterotomy    Surgeon(s):  Rekha Villalta MD    Assistant:  * No surgical staff found *    Anesthesia: General    Estimated Blood Loss (mL): 886     Complications: Other: Incidental Enterotomy  No plane of dissection at a dense adhesion. The only way to separate the bowel and relive the obstruction was to make an enterotomy in one limb of the obstructed segment. Specimens:   * No specimens in log *    Implants:  Implant Name Type Inv.  Item Serial No.  Lot No. LRB No. Used Action   BARRIER ADH SHT 6X5 IN SODIUM HYALURONATE [employer] SEPRAFILM - [id number]  BARRIER ADH SHT 6X5 IN SODIUM HYALURONATE [employer] SEPRAFILM  [id number]- [id number] N/A 1 Implanted         Drains:   NG/OG/NJ/NE Tube Nasogastric 18 fr Left nostril (Active)   Surrounding Skin Clean, dry & intact 10/12/23 0800   Securement device Tape 10/12/23 0800   Status Irrigated 10/12/23 0800   Placement Verified Gastric Contents 10/12/23 0800   NG/OG/NJ/NE External Measurement (cm) 60 cm 10/12/23 0800   Drainage Appearance Green 10/12/23 0800   Free Water/Flush (mL) 30 mL 10/11/23 1800   Output (mL) 50 ml 10/12/23 0800       Urinary Catheter 10/10/23 (Active)   Catheter Indications Need for fluid volume management of the critically ill patient in a critical care setting;Prolonged immobilization (e.g. unstable thoracic or lumbar spine, multiple traumatic injuries such as pelvic fractures) 10/12/23 0800   Site Assessment No urethral drainage 10/12/23 0800   Urine Color Yellow 10/12/23 0800   Urine

## 2023-10-13 LAB
ANION GAP SERPL CALCULATED.3IONS-SCNC: 17 MMOL/L (ref 7–19)
BASOPHILS # BLD: 0 K/UL (ref 0–0.2)
BASOPHILS NFR BLD: 0.1 % (ref 0–1)
BUN SERPL-MCNC: 11 MG/DL (ref 8–23)
CALCIUM SERPL-MCNC: 7.2 MG/DL (ref 8.8–10.2)
CHLORIDE SERPL-SCNC: 102 MMOL/L (ref 98–111)
CO2 SERPL-SCNC: 20 MMOL/L (ref 22–29)
CREAT SERPL-MCNC: 0.8 MG/DL (ref 0.5–0.9)
EOSINOPHIL # BLD: 0 K/UL (ref 0–0.6)
EOSINOPHIL NFR BLD: 0 % (ref 0–5)
ERYTHROCYTE [DISTWIDTH] IN BLOOD BY AUTOMATED COUNT: 20.5 % (ref 11.5–14.5)
GLUCOSE BLD-MCNC: 108 MG/DL (ref 70–99)
GLUCOSE BLD-MCNC: 124 MG/DL (ref 70–99)
GLUCOSE BLD-MCNC: 166 MG/DL (ref 70–99)
GLUCOSE SERPL-MCNC: 145 MG/DL (ref 74–109)
HCT VFR BLD AUTO: 25.7 % (ref 37–47)
HCT VFR BLD AUTO: 26.5 % (ref 37–47)
HCT VFR BLD AUTO: 26.7 % (ref 37–47)
HGB BLD-MCNC: 7.8 G/DL (ref 12–16)
HGB BLD-MCNC: 7.9 G/DL (ref 12–16)
HGB BLD-MCNC: 8 G/DL (ref 12–16)
IMM GRANULOCYTES # BLD: 0.1 K/UL
LYMPHOCYTES # BLD: 0.8 K/UL (ref 1.1–4.5)
LYMPHOCYTES NFR BLD: 5.1 % (ref 20–40)
MCH RBC QN AUTO: 24.6 PG (ref 27–31)
MCHC RBC AUTO-ENTMCNC: 30.4 G/DL (ref 33–37)
MCV RBC AUTO: 81.1 FL (ref 81–99)
MONOCYTES # BLD: 0.8 K/UL (ref 0–0.9)
MONOCYTES NFR BLD: 5.3 % (ref 0–10)
NEUTROPHILS # BLD: 13.2 K/UL (ref 1.5–7.5)
NEUTS SEG NFR BLD: 89 % (ref 50–65)
PERFORMED ON: ABNORMAL
PLATELET # BLD AUTO: 211 K/UL (ref 130–400)
PMV BLD AUTO: 10.5 FL (ref 9.4–12.3)
POTASSIUM SERPL-SCNC: 3.9 MMOL/L (ref 3.5–5)
RBC # BLD AUTO: 3.17 M/UL (ref 4.2–5.4)
SODIUM SERPL-SCNC: 139 MMOL/L (ref 136–145)
WBC # BLD AUTO: 14.8 K/UL (ref 4.8–10.8)

## 2023-10-13 PROCEDURE — 6360000002 HC RX W HCPCS: Performed by: SURGERY

## 2023-10-13 PROCEDURE — 80048 BASIC METABOLIC PNL TOTAL CA: CPT

## 2023-10-13 PROCEDURE — 85014 HEMATOCRIT: CPT

## 2023-10-13 PROCEDURE — 85018 HEMOGLOBIN: CPT

## 2023-10-13 PROCEDURE — 36415 COLL VENOUS BLD VENIPUNCTURE: CPT

## 2023-10-13 PROCEDURE — 85025 COMPLETE CBC W/AUTO DIFF WBC: CPT

## 2023-10-13 PROCEDURE — 2700000000 HC OXYGEN THERAPY PER DAY

## 2023-10-13 PROCEDURE — 82962 GLUCOSE BLOOD TEST: CPT

## 2023-10-13 PROCEDURE — 1210000000 HC MED SURG R&B

## 2023-10-13 PROCEDURE — 2580000003 HC RX 258: Performed by: SURGERY

## 2023-10-13 PROCEDURE — 99024 POSTOP FOLLOW-UP VISIT: CPT | Performed by: SURGERY

## 2023-10-13 PROCEDURE — 6370000000 HC RX 637 (ALT 250 FOR IP): Performed by: SURGERY

## 2023-10-13 RX ORDER — KETOROLAC TROMETHAMINE 30 MG/ML
15 INJECTION, SOLUTION INTRAMUSCULAR; INTRAVENOUS EVERY 6 HOURS
Status: DISCONTINUED | OUTPATIENT
Start: 2023-10-13 | End: 2023-10-14

## 2023-10-13 RX ORDER — NALOXONE HYDROCHLORIDE 0.4 MG/ML
INJECTION, SOLUTION INTRAMUSCULAR; INTRAVENOUS; SUBCUTANEOUS PRN
Status: DISCONTINUED | OUTPATIENT
Start: 2023-10-13 | End: 2023-10-15

## 2023-10-13 RX ORDER — MORPHINE SULFATE/0.9% NACL/PF 1 MG/ML
SYRINGE (ML) INJECTION CONTINUOUS
Status: DISCONTINUED | OUTPATIENT
Start: 2023-10-13 | End: 2023-10-15

## 2023-10-13 RX ADMIN — METRONIDAZOLE 500 MG: 5 INJECTION, SOLUTION INTRAVENOUS at 03:35

## 2023-10-13 RX ADMIN — AMLODIPINE BESYLATE 5 MG: 5 TABLET ORAL at 09:32

## 2023-10-13 RX ADMIN — METRONIDAZOLE 500 MG: 5 INJECTION, SOLUTION INTRAVENOUS at 12:42

## 2023-10-13 RX ADMIN — ONDANSETRON 4 MG: 2 INJECTION INTRAMUSCULAR; INTRAVENOUS at 19:38

## 2023-10-13 RX ADMIN — KETOROLAC TROMETHAMINE 15 MG: 30 INJECTION, SOLUTION INTRAMUSCULAR; INTRAVENOUS at 15:45

## 2023-10-13 RX ADMIN — SODIUM CHLORIDE, SODIUM LACTATE, POTASSIUM CHLORIDE, AND CALCIUM CHLORIDE: 600; 310; 30; 20 INJECTION, SOLUTION INTRAVENOUS at 01:01

## 2023-10-13 RX ADMIN — HYDROMORPHONE HYDROCHLORIDE 0.5 MG: 1 INJECTION, SOLUTION INTRAMUSCULAR; INTRAVENOUS; SUBCUTANEOUS at 07:47

## 2023-10-13 RX ADMIN — HYDROMORPHONE HYDROCHLORIDE 0.5 MG: 1 INJECTION, SOLUTION INTRAMUSCULAR; INTRAVENOUS; SUBCUTANEOUS at 03:32

## 2023-10-13 RX ADMIN — WATER 2000 MG: 1 INJECTION INTRAMUSCULAR; INTRAVENOUS; SUBCUTANEOUS at 05:17

## 2023-10-13 RX ADMIN — SODIUM CHLORIDE, PRESERVATIVE FREE 10 ML: 5 INJECTION INTRAVENOUS at 09:33

## 2023-10-13 RX ADMIN — CLOPIDOGREL BISULFATE 75 MG: 75 TABLET ORAL at 09:33

## 2023-10-13 RX ADMIN — SODIUM CHLORIDE, PRESERVATIVE FREE 10 ML: 5 INJECTION INTRAVENOUS at 19:39

## 2023-10-13 RX ADMIN — ROSUVASTATIN CALCIUM 20 MG: 20 TABLET, COATED ORAL at 19:38

## 2023-10-13 RX ADMIN — KETOROLAC TROMETHAMINE 15 MG: 30 INJECTION, SOLUTION INTRAMUSCULAR; INTRAVENOUS at 23:35

## 2023-10-13 RX ADMIN — METRONIDAZOLE 500 MG: 5 INJECTION, SOLUTION INTRAVENOUS at 19:38

## 2023-10-13 RX ADMIN — HYDROMORPHONE HYDROCHLORIDE 0.5 MG: 1 INJECTION, SOLUTION INTRAMUSCULAR; INTRAVENOUS; SUBCUTANEOUS at 01:14

## 2023-10-13 RX ADMIN — WATER 2000 MG: 1 INJECTION INTRAMUSCULAR; INTRAVENOUS; SUBCUTANEOUS at 14:02

## 2023-10-13 RX ADMIN — MORPHINE SULFATE: 1 INJECTION INTRAVENOUS at 10:43

## 2023-10-13 RX ADMIN — CARVEDILOL 25 MG: 25 TABLET, FILM COATED ORAL at 09:33

## 2023-10-13 RX ADMIN — ONDANSETRON 4 MG: 2 INJECTION INTRAMUSCULAR; INTRAVENOUS at 09:52

## 2023-10-13 RX ADMIN — HYDRALAZINE HYDROCHLORIDE 25 MG: 25 TABLET, FILM COATED ORAL at 05:23

## 2023-10-13 RX ADMIN — ASPIRIN 81 MG: 81 TABLET, CHEWABLE ORAL at 09:33

## 2023-10-13 RX ADMIN — MORPHINE SULFATE: 1 INJECTION INTRAVENOUS at 17:00

## 2023-10-13 ASSESSMENT — PAIN SCALES - GENERAL
PAINLEVEL_OUTOF10: 10
PAINLEVEL_OUTOF10: 4
PAINLEVEL_OUTOF10: 8
PAINLEVEL_OUTOF10: 10
PAINLEVEL_OUTOF10: 0
PAINLEVEL_OUTOF10: 7
PAINLEVEL_OUTOF10: 10
PAINLEVEL_OUTOF10: 7
PAINLEVEL_OUTOF10: 10

## 2023-10-13 ASSESSMENT — PAIN DESCRIPTION - DESCRIPTORS
DESCRIPTORS: DISCOMFORT;BURNING
DESCRIPTORS: TENDER
DESCRIPTORS: TENDER
DESCRIPTORS: BURNING;DISCOMFORT
DESCRIPTORS: ACHING
DESCRIPTORS: TEARING

## 2023-10-13 ASSESSMENT — PAIN DESCRIPTION - ORIENTATION
ORIENTATION: RIGHT;MID
ORIENTATION: RIGHT
ORIENTATION: MID;RIGHT
ORIENTATION: MID

## 2023-10-13 ASSESSMENT — PAIN DESCRIPTION - LOCATION
LOCATION: ABDOMEN
LOCATION: ABDOMEN;BACK;KNEE
LOCATION: ABDOMEN
LOCATION: ABDOMEN;KNEE;BACK

## 2023-10-13 NOTE — PLAN OF CARE
Problem: Nutrition Deficit:  Goal: Optimize nutritional status  Outcome: Not Progressing  Flowsheets (Taken 10/13/2023 1211)  Nutrient intake appropriate for improving, restoring, or maintaining nutritional needs: Assess nutritional status and recommend course of action

## 2023-10-14 LAB
ANION GAP SERPL CALCULATED.3IONS-SCNC: 13 MMOL/L (ref 7–19)
BASOPHILS # BLD: 0 K/UL (ref 0–0.2)
BASOPHILS NFR BLD: 0.2 % (ref 0–1)
BUN SERPL-MCNC: 16 MG/DL (ref 8–23)
CALCIUM SERPL-MCNC: 7.3 MG/DL (ref 8.8–10.2)
CHLORIDE SERPL-SCNC: 101 MMOL/L (ref 98–111)
CO2 SERPL-SCNC: 25 MMOL/L (ref 22–29)
CREAT SERPL-MCNC: 0.9 MG/DL (ref 0.5–0.9)
EOSINOPHIL # BLD: 0.1 K/UL (ref 0–0.6)
EOSINOPHIL NFR BLD: 0.5 % (ref 0–5)
ERYTHROCYTE [DISTWIDTH] IN BLOOD BY AUTOMATED COUNT: 20.7 % (ref 11.5–14.5)
GLUCOSE BLD-MCNC: 102 MG/DL (ref 70–99)
GLUCOSE BLD-MCNC: 109 MG/DL (ref 70–99)
GLUCOSE BLD-MCNC: 92 MG/DL (ref 70–99)
GLUCOSE BLD-MCNC: 93 MG/DL (ref 70–99)
GLUCOSE SERPL-MCNC: 98 MG/DL (ref 74–109)
HCT VFR BLD AUTO: 22.5 % (ref 37–47)
HCT VFR BLD AUTO: 25.1 % (ref 37–47)
HGB BLD-MCNC: 6.7 G/DL (ref 12–16)
HGB BLD-MCNC: 7.7 G/DL (ref 12–16)
IMM GRANULOCYTES # BLD: 0.1 K/UL
LYMPHOCYTES # BLD: 1.3 K/UL (ref 1.1–4.5)
LYMPHOCYTES NFR BLD: 10.4 % (ref 20–40)
MAGNESIUM SERPL-MCNC: 1.5 MG/DL (ref 1.6–2.4)
MCH RBC QN AUTO: 24.5 PG (ref 27–31)
MCHC RBC AUTO-ENTMCNC: 29.8 G/DL (ref 33–37)
MCV RBC AUTO: 82.4 FL (ref 81–99)
MONOCYTES # BLD: 0.9 K/UL (ref 0–0.9)
MONOCYTES NFR BLD: 7.1 % (ref 0–10)
NEUTROPHILS # BLD: 10.2 K/UL (ref 1.5–7.5)
NEUTS SEG NFR BLD: 80.9 % (ref 50–65)
PERFORMED ON: ABNORMAL
PERFORMED ON: ABNORMAL
PERFORMED ON: NORMAL
PERFORMED ON: NORMAL
PLATELET # BLD AUTO: 192 K/UL (ref 130–400)
PMV BLD AUTO: 10.7 FL (ref 9.4–12.3)
POTASSIUM SERPL-SCNC: 3.4 MMOL/L (ref 3.5–5)
RBC # BLD AUTO: 2.73 M/UL (ref 4.2–5.4)
SODIUM SERPL-SCNC: 139 MMOL/L (ref 136–145)
WBC # BLD AUTO: 12.6 K/UL (ref 4.8–10.8)

## 2023-10-14 PROCEDURE — 36430 TRANSFUSION BLD/BLD COMPNT: CPT

## 2023-10-14 PROCEDURE — 6370000000 HC RX 637 (ALT 250 FOR IP): Performed by: SURGERY

## 2023-10-14 PROCEDURE — 85014 HEMATOCRIT: CPT

## 2023-10-14 PROCEDURE — 85025 COMPLETE CBC W/AUTO DIFF WBC: CPT

## 2023-10-14 PROCEDURE — 85018 HEMOGLOBIN: CPT

## 2023-10-14 PROCEDURE — 1210000000 HC MED SURG R&B

## 2023-10-14 PROCEDURE — 2580000003 HC RX 258: Performed by: SURGERY

## 2023-10-14 PROCEDURE — 36415 COLL VENOUS BLD VENIPUNCTURE: CPT

## 2023-10-14 PROCEDURE — 83735 ASSAY OF MAGNESIUM: CPT

## 2023-10-14 PROCEDURE — 2700000000 HC OXYGEN THERAPY PER DAY

## 2023-10-14 PROCEDURE — 6360000002 HC RX W HCPCS: Performed by: SURGERY

## 2023-10-14 PROCEDURE — 94760 N-INVAS EAR/PLS OXIMETRY 1: CPT

## 2023-10-14 PROCEDURE — 99024 POSTOP FOLLOW-UP VISIT: CPT | Performed by: SURGERY

## 2023-10-14 PROCEDURE — 82962 GLUCOSE BLOOD TEST: CPT

## 2023-10-14 PROCEDURE — 80048 BASIC METABOLIC PNL TOTAL CA: CPT

## 2023-10-14 RX ORDER — SODIUM CHLORIDE 9 MG/ML
INJECTION, SOLUTION INTRAVENOUS PRN
Status: DISCONTINUED | OUTPATIENT
Start: 2023-10-14 | End: 2023-10-22 | Stop reason: HOSPADM

## 2023-10-14 RX ADMIN — ISOSORBIDE MONONITRATE 30 MG: 30 TABLET, EXTENDED RELEASE ORAL at 09:26

## 2023-10-14 RX ADMIN — HYDRALAZINE HYDROCHLORIDE 25 MG: 25 TABLET, FILM COATED ORAL at 09:28

## 2023-10-14 RX ADMIN — KETOROLAC TROMETHAMINE 15 MG: 30 INJECTION, SOLUTION INTRAMUSCULAR; INTRAVENOUS at 03:30

## 2023-10-14 RX ADMIN — KETOROLAC TROMETHAMINE 15 MG: 30 INJECTION, SOLUTION INTRAMUSCULAR; INTRAVENOUS at 09:27

## 2023-10-14 RX ADMIN — MORPHINE SULFATE: 1 INJECTION INTRAVENOUS at 14:28

## 2023-10-14 RX ADMIN — SODIUM CHLORIDE, PRESERVATIVE FREE 10 ML: 5 INJECTION INTRAVENOUS at 09:27

## 2023-10-14 RX ADMIN — AMLODIPINE BESYLATE 5 MG: 5 TABLET ORAL at 09:26

## 2023-10-14 RX ADMIN — ROSUVASTATIN CALCIUM 20 MG: 20 TABLET, COATED ORAL at 22:07

## 2023-10-14 RX ADMIN — METRONIDAZOLE 500 MG: 5 INJECTION, SOLUTION INTRAVENOUS at 13:48

## 2023-10-14 RX ADMIN — ASPIRIN 81 MG: 81 TABLET, CHEWABLE ORAL at 09:26

## 2023-10-14 RX ADMIN — METRONIDAZOLE 500 MG: 5 INJECTION, SOLUTION INTRAVENOUS at 22:08

## 2023-10-14 RX ADMIN — METRONIDAZOLE 500 MG: 5 INJECTION, SOLUTION INTRAVENOUS at 05:03

## 2023-10-14 RX ADMIN — MORPHINE SULFATE: 1 INJECTION INTRAVENOUS at 04:41

## 2023-10-14 RX ADMIN — CLOPIDOGREL BISULFATE 75 MG: 75 TABLET ORAL at 09:26

## 2023-10-14 RX ADMIN — CARVEDILOL 25 MG: 25 TABLET, FILM COATED ORAL at 09:26

## 2023-10-14 ASSESSMENT — PAIN SCALES - GENERAL
PAINLEVEL_OUTOF10: 5
PAINLEVEL_OUTOF10: 3

## 2023-10-14 ASSESSMENT — PAIN DESCRIPTION - DESCRIPTORS: DESCRIPTORS: ACHING

## 2023-10-14 ASSESSMENT — PAIN DESCRIPTION - LOCATION: LOCATION: ABDOMEN

## 2023-10-15 LAB
ANION GAP SERPL CALCULATED.3IONS-SCNC: 14 MMOL/L (ref 7–19)
ANISOCYTOSIS BLD QL SMEAR: ABNORMAL
BASOPHILS # BLD: 0 K/UL (ref 0–0.2)
BASOPHILS NFR BLD: 0 % (ref 0–1)
BUN SERPL-MCNC: 17 MG/DL (ref 8–23)
CALCIUM SERPL-MCNC: 7.5 MG/DL (ref 8.8–10.2)
CHLORIDE SERPL-SCNC: 102 MMOL/L (ref 98–111)
CO2 SERPL-SCNC: 26 MMOL/L (ref 22–29)
CREAT SERPL-MCNC: 0.9 MG/DL (ref 0.5–0.9)
EOSINOPHIL # BLD: 0.54 K/UL (ref 0–0.6)
EOSINOPHIL NFR BLD: 4 % (ref 0–5)
ERYTHROCYTE [DISTWIDTH] IN BLOOD BY AUTOMATED COUNT: 19.4 % (ref 11.5–14.5)
GLUCOSE BLD-MCNC: 101 MG/DL (ref 70–99)
GLUCOSE BLD-MCNC: 107 MG/DL (ref 70–99)
GLUCOSE BLD-MCNC: 123 MG/DL (ref 70–99)
GLUCOSE BLD-MCNC: 96 MG/DL (ref 70–99)
GLUCOSE SERPL-MCNC: 80 MG/DL (ref 74–109)
HCT VFR BLD AUTO: 26.7 % (ref 37–47)
HGB BLD-MCNC: 7.8 G/DL (ref 12–16)
IMM GRANULOCYTES # BLD: 0.2 K/UL
LYMPHOCYTES # BLD: 2.7 K/UL (ref 1.1–4.5)
LYMPHOCYTES NFR BLD: 19 % (ref 20–40)
MAGNESIUM SERPL-MCNC: 1.6 MG/DL (ref 1.6–2.4)
MCH RBC QN AUTO: 24.8 PG (ref 27–31)
MCHC RBC AUTO-ENTMCNC: 29.2 G/DL (ref 33–37)
MCV RBC AUTO: 84.8 FL (ref 81–99)
MONOCYTES # BLD: 0.7 K/UL (ref 0–0.9)
MONOCYTES NFR BLD: 5 % (ref 0–10)
NEUTROPHILS # BLD: 9.7 K/UL (ref 1.5–7.5)
NEUTS BAND NFR BLD MANUAL: 1 % (ref 0–5)
NEUTS SEG NFR BLD: 70 % (ref 50–65)
PERFORMED ON: ABNORMAL
PERFORMED ON: NORMAL
PLATELET # BLD AUTO: 227 K/UL (ref 130–400)
PLATELET SLIDE REVIEW: ADEQUATE
PMV BLD AUTO: 10.7 FL (ref 9.4–12.3)
POTASSIUM SERPL-SCNC: 3.4 MMOL/L (ref 3.5–5)
RBC # BLD AUTO: 3.15 M/UL (ref 4.2–5.4)
SODIUM SERPL-SCNC: 142 MMOL/L (ref 136–145)
VARIANT LYMPHS NFR BLD: 1 % (ref 0–8)
WBC # BLD AUTO: 13.6 K/UL (ref 4.8–10.8)

## 2023-10-15 PROCEDURE — 83735 ASSAY OF MAGNESIUM: CPT

## 2023-10-15 PROCEDURE — 6360000002 HC RX W HCPCS: Performed by: SURGERY

## 2023-10-15 PROCEDURE — 2580000003 HC RX 258: Performed by: SURGERY

## 2023-10-15 PROCEDURE — 6370000000 HC RX 637 (ALT 250 FOR IP): Performed by: SURGERY

## 2023-10-15 PROCEDURE — 82962 GLUCOSE BLOOD TEST: CPT

## 2023-10-15 PROCEDURE — 99024 POSTOP FOLLOW-UP VISIT: CPT | Performed by: SURGERY

## 2023-10-15 PROCEDURE — 80048 BASIC METABOLIC PNL TOTAL CA: CPT

## 2023-10-15 PROCEDURE — 97530 THERAPEUTIC ACTIVITIES: CPT

## 2023-10-15 PROCEDURE — 1210000000 HC MED SURG R&B

## 2023-10-15 PROCEDURE — 97161 PT EVAL LOW COMPLEX 20 MIN: CPT

## 2023-10-15 PROCEDURE — 36415 COLL VENOUS BLD VENIPUNCTURE: CPT

## 2023-10-15 PROCEDURE — 85025 COMPLETE CBC W/AUTO DIFF WBC: CPT

## 2023-10-15 PROCEDURE — 2700000000 HC OXYGEN THERAPY PER DAY

## 2023-10-15 PROCEDURE — 94760 N-INVAS EAR/PLS OXIMETRY 1: CPT

## 2023-10-15 RX ORDER — HYDROXYZINE HYDROCHLORIDE 25 MG/ML
25 INJECTION, SOLUTION INTRAMUSCULAR EVERY 8 HOURS PRN
Status: DISCONTINUED | OUTPATIENT
Start: 2023-10-15 | End: 2023-10-22 | Stop reason: HOSPADM

## 2023-10-15 RX ORDER — METHOCARBAMOL 500 MG/1
1000 TABLET, FILM COATED ORAL 3 TIMES DAILY
Status: DISCONTINUED | OUTPATIENT
Start: 2023-10-15 | End: 2023-10-22 | Stop reason: HOSPADM

## 2023-10-15 RX ORDER — HYDROCODONE BITARTRATE AND ACETAMINOPHEN 7.5; 325 MG/1; MG/1
1 TABLET ORAL EVERY 6 HOURS PRN
Status: DISCONTINUED | OUTPATIENT
Start: 2023-10-15 | End: 2023-10-21

## 2023-10-15 RX ORDER — MORPHINE SULFATE 2 MG/ML
2 INJECTION, SOLUTION INTRAMUSCULAR; INTRAVENOUS
Status: DISCONTINUED | OUTPATIENT
Start: 2023-10-15 | End: 2023-10-16

## 2023-10-15 RX ORDER — PANTOPRAZOLE SODIUM 40 MG/1
40 TABLET, DELAYED RELEASE ORAL
Status: DISCONTINUED | OUTPATIENT
Start: 2023-10-15 | End: 2023-10-21

## 2023-10-15 RX ADMIN — AMLODIPINE BESYLATE 5 MG: 5 TABLET ORAL at 08:02

## 2023-10-15 RX ADMIN — SODIUM CHLORIDE, PRESERVATIVE FREE 10 ML: 5 INJECTION INTRAVENOUS at 08:00

## 2023-10-15 RX ADMIN — CARVEDILOL 25 MG: 25 TABLET, FILM COATED ORAL at 07:59

## 2023-10-15 RX ADMIN — PANTOPRAZOLE SODIUM 40 MG: 40 TABLET, DELAYED RELEASE ORAL at 16:12

## 2023-10-15 RX ADMIN — ASPIRIN 81 MG: 81 TABLET, CHEWABLE ORAL at 07:58

## 2023-10-15 RX ADMIN — METRONIDAZOLE 500 MG: 5 INJECTION, SOLUTION INTRAVENOUS at 20:37

## 2023-10-15 RX ADMIN — ONDANSETRON 4 MG: 2 INJECTION INTRAMUSCULAR; INTRAVENOUS at 02:39

## 2023-10-15 RX ADMIN — METRONIDAZOLE 500 MG: 5 INJECTION, SOLUTION INTRAVENOUS at 11:44

## 2023-10-15 RX ADMIN — HYDRALAZINE HYDROCHLORIDE 25 MG: 25 TABLET, FILM COATED ORAL at 07:58

## 2023-10-15 RX ADMIN — HYDROMORPHONE HYDROCHLORIDE 0.5 MG: 1 INJECTION, SOLUTION INTRAMUSCULAR; INTRAVENOUS; SUBCUTANEOUS at 23:52

## 2023-10-15 RX ADMIN — HYDROCODONE BITARTRATE AND ACETAMINOPHEN 1 TABLET: 7.5; 325 TABLET ORAL at 20:31

## 2023-10-15 RX ADMIN — HYDRALAZINE HYDROCHLORIDE 25 MG: 25 TABLET, FILM COATED ORAL at 13:45

## 2023-10-15 RX ADMIN — ROSUVASTATIN CALCIUM 20 MG: 20 TABLET, COATED ORAL at 20:31

## 2023-10-15 RX ADMIN — CLOPIDOGREL BISULFATE 75 MG: 75 TABLET ORAL at 07:59

## 2023-10-15 RX ADMIN — HYDROCODONE BITARTRATE AND ACETAMINOPHEN 1 TABLET: 7.5; 325 TABLET ORAL at 13:52

## 2023-10-15 RX ADMIN — SODIUM CHLORIDE, PRESERVATIVE FREE 10 ML: 5 INJECTION INTRAVENOUS at 20:32

## 2023-10-15 RX ADMIN — CARVEDILOL 25 MG: 25 TABLET, FILM COATED ORAL at 16:12

## 2023-10-15 RX ADMIN — ISOSORBIDE MONONITRATE 30 MG: 30 TABLET, EXTENDED RELEASE ORAL at 07:58

## 2023-10-15 RX ADMIN — METHOCARBAMOL 1000 MG: 500 TABLET ORAL at 20:31

## 2023-10-15 RX ADMIN — METHOCARBAMOL 500 MG: 500 TABLET ORAL at 13:45

## 2023-10-15 RX ADMIN — METRONIDAZOLE 500 MG: 5 INJECTION, SOLUTION INTRAVENOUS at 06:40

## 2023-10-15 ASSESSMENT — PAIN SCALES - GENERAL
PAINLEVEL_OUTOF10: 6
PAINLEVEL_OUTOF10: 10
PAINLEVEL_OUTOF10: 7

## 2023-10-16 ENCOUNTER — APPOINTMENT (OUTPATIENT)
Dept: GENERAL RADIOLOGY | Age: 73
DRG: 853 | End: 2023-10-16
Payer: MEDICARE

## 2023-10-16 LAB
ANION GAP SERPL CALCULATED.3IONS-SCNC: 18 MMOL/L (ref 7–19)
BASOPHILS # BLD: 0.1 K/UL (ref 0–0.2)
BASOPHILS NFR BLD: 0.4 % (ref 0–1)
BNP BLD-MCNC: 2225 PG/ML (ref 0–124)
BUN SERPL-MCNC: 15 MG/DL (ref 8–23)
CALCIUM SERPL-MCNC: 7.8 MG/DL (ref 8.8–10.2)
CHLORIDE SERPL-SCNC: 96 MMOL/L (ref 98–111)
CO2 SERPL-SCNC: 26 MMOL/L (ref 22–29)
CREAT SERPL-MCNC: 0.8 MG/DL (ref 0.5–0.9)
CRP SERPL HS-MCNC: 11.78 MG/DL (ref 0–0.5)
EOSINOPHIL # BLD: 0.3 K/UL (ref 0–0.6)
EOSINOPHIL NFR BLD: 2 % (ref 0–5)
ERYTHROCYTE [DISTWIDTH] IN BLOOD BY AUTOMATED COUNT: 18.9 % (ref 11.5–14.5)
GLUCOSE BLD-MCNC: 101 MG/DL (ref 70–99)
GLUCOSE BLD-MCNC: 107 MG/DL (ref 70–99)
GLUCOSE BLD-MCNC: 117 MG/DL (ref 70–99)
GLUCOSE BLD-MCNC: 138 MG/DL (ref 70–99)
GLUCOSE SERPL-MCNC: 97 MG/DL (ref 74–109)
HCT VFR BLD AUTO: 25.2 % (ref 37–47)
HGB BLD-MCNC: 7.5 G/DL (ref 12–16)
IMM GRANULOCYTES # BLD: 0.1 K/UL
LACTATE BLDV-SCNC: 0.8 MMOL/L (ref 0.5–1.9)
LYMPHOCYTES # BLD: 1.8 K/UL (ref 1.1–4.5)
LYMPHOCYTES NFR BLD: 13 % (ref 20–40)
MAGNESIUM SERPL-MCNC: 1.6 MG/DL (ref 1.6–2.4)
MCH RBC QN AUTO: 25.2 PG (ref 27–31)
MCHC RBC AUTO-ENTMCNC: 29.8 G/DL (ref 33–37)
MCV RBC AUTO: 84.6 FL (ref 81–99)
MONOCYTES # BLD: 1 K/UL (ref 0–0.9)
MONOCYTES NFR BLD: 7 % (ref 0–10)
NEUTROPHILS # BLD: 10.8 K/UL (ref 1.5–7.5)
NEUTS SEG NFR BLD: 77 % (ref 50–65)
PERFORMED ON: ABNORMAL
PLATELET # BLD AUTO: 298 K/UL (ref 130–400)
PMV BLD AUTO: 10.7 FL (ref 9.4–12.3)
POTASSIUM SERPL-SCNC: 3.5 MMOL/L (ref 3.5–5)
PROCALCITONIN: 0.33 NG/ML (ref 0–0.09)
RBC # BLD AUTO: 2.98 M/UL (ref 4.2–5.4)
SODIUM SERPL-SCNC: 140 MMOL/L (ref 136–145)
TROPONIN T SERPL-MCNC: 0.02 NG/ML (ref 0–0.03)
WBC # BLD AUTO: 14 K/UL (ref 4.8–10.8)

## 2023-10-16 PROCEDURE — 80048 BASIC METABOLIC PNL TOTAL CA: CPT

## 2023-10-16 PROCEDURE — 83605 ASSAY OF LACTIC ACID: CPT

## 2023-10-16 PROCEDURE — 6370000000 HC RX 637 (ALT 250 FOR IP): Performed by: SURGERY

## 2023-10-16 PROCEDURE — 83880 ASSAY OF NATRIURETIC PEPTIDE: CPT

## 2023-10-16 PROCEDURE — 99024 POSTOP FOLLOW-UP VISIT: CPT | Performed by: SURGERY

## 2023-10-16 PROCEDURE — 1210000000 HC MED SURG R&B

## 2023-10-16 PROCEDURE — 94760 N-INVAS EAR/PLS OXIMETRY 1: CPT

## 2023-10-16 PROCEDURE — 71045 X-RAY EXAM CHEST 1 VIEW: CPT

## 2023-10-16 PROCEDURE — 2580000003 HC RX 258: Performed by: SURGERY

## 2023-10-16 PROCEDURE — 85025 COMPLETE CBC W/AUTO DIFF WBC: CPT

## 2023-10-16 PROCEDURE — 82962 GLUCOSE BLOOD TEST: CPT

## 2023-10-16 PROCEDURE — 6360000002 HC RX W HCPCS: Performed by: SURGERY

## 2023-10-16 PROCEDURE — 6360000002 HC RX W HCPCS: Performed by: HOSPITALIST

## 2023-10-16 PROCEDURE — 2500000003 HC RX 250 WO HCPCS: Performed by: INTERNAL MEDICINE

## 2023-10-16 PROCEDURE — 84145 PROCALCITONIN (PCT): CPT

## 2023-10-16 PROCEDURE — 36415 COLL VENOUS BLD VENIPUNCTURE: CPT

## 2023-10-16 PROCEDURE — 2700000000 HC OXYGEN THERAPY PER DAY

## 2023-10-16 PROCEDURE — 86140 C-REACTIVE PROTEIN: CPT

## 2023-10-16 PROCEDURE — 6370000000 HC RX 637 (ALT 250 FOR IP): Performed by: INTERNAL MEDICINE

## 2023-10-16 PROCEDURE — 83735 ASSAY OF MAGNESIUM: CPT

## 2023-10-16 PROCEDURE — 84484 ASSAY OF TROPONIN QUANT: CPT

## 2023-10-16 PROCEDURE — 6370000000 HC RX 637 (ALT 250 FOR IP): Performed by: HOSPITALIST

## 2023-10-16 RX ORDER — BUMETANIDE 0.25 MG/ML
0.5 INJECTION INTRAMUSCULAR; INTRAVENOUS ONCE
Status: COMPLETED | OUTPATIENT
Start: 2023-10-16 | End: 2023-10-16

## 2023-10-16 RX ORDER — BUSPIRONE HYDROCHLORIDE 5 MG/1
5 TABLET ORAL EVERY 8 HOURS PRN
Status: DISCONTINUED | OUTPATIENT
Start: 2023-10-16 | End: 2023-10-22 | Stop reason: HOSPADM

## 2023-10-16 RX ORDER — MORPHINE SULFATE 2 MG/ML
1 INJECTION, SOLUTION INTRAMUSCULAR; INTRAVENOUS EVERY 6 HOURS PRN
Status: DISCONTINUED | OUTPATIENT
Start: 2023-10-16 | End: 2023-10-22 | Stop reason: HOSPADM

## 2023-10-16 RX ORDER — CARVEDILOL 12.5 MG/1
12.5 TABLET ORAL 2 TIMES DAILY WITH MEALS
Status: DISCONTINUED | OUTPATIENT
Start: 2023-10-16 | End: 2023-10-22 | Stop reason: HOSPADM

## 2023-10-16 RX ADMIN — HYDRALAZINE HYDROCHLORIDE 25 MG: 25 TABLET, FILM COATED ORAL at 05:14

## 2023-10-16 RX ADMIN — HYDROXYZINE HYDROCHLORIDE 25 MG: 25 INJECTION, SOLUTION INTRAMUSCULAR at 01:34

## 2023-10-16 RX ADMIN — CARVEDILOL 12.5 MG: 12.5 TABLET, FILM COATED ORAL at 18:19

## 2023-10-16 RX ADMIN — AMLODIPINE BESYLATE 5 MG: 5 TABLET ORAL at 08:11

## 2023-10-16 RX ADMIN — HYDROCODONE BITARTRATE AND ACETAMINOPHEN 1 TABLET: 7.5; 325 TABLET ORAL at 20:39

## 2023-10-16 RX ADMIN — SODIUM CHLORIDE, PRESERVATIVE FREE 10 ML: 5 INJECTION INTRAVENOUS at 20:38

## 2023-10-16 RX ADMIN — ONDANSETRON 4 MG: 2 INJECTION INTRAMUSCULAR; INTRAVENOUS at 08:10

## 2023-10-16 RX ADMIN — METHOCARBAMOL 1000 MG: 500 TABLET ORAL at 20:37

## 2023-10-16 RX ADMIN — HYDROCODONE BITARTRATE AND ACETAMINOPHEN 1 TABLET: 7.5; 325 TABLET ORAL at 04:09

## 2023-10-16 RX ADMIN — HYDROCODONE BITARTRATE AND ACETAMINOPHEN 1 TABLET: 7.5; 325 TABLET ORAL at 12:21

## 2023-10-16 RX ADMIN — METHOCARBAMOL 1000 MG: 500 TABLET ORAL at 08:10

## 2023-10-16 RX ADMIN — METRONIDAZOLE 500 MG: 5 INJECTION, SOLUTION INTRAVENOUS at 20:42

## 2023-10-16 RX ADMIN — BUSPIRONE HYDROCHLORIDE 5 MG: 5 TABLET ORAL at 20:38

## 2023-10-16 RX ADMIN — METRONIDAZOLE 500 MG: 5 INJECTION, SOLUTION INTRAVENOUS at 12:23

## 2023-10-16 RX ADMIN — ROSUVASTATIN CALCIUM 20 MG: 20 TABLET, COATED ORAL at 20:38

## 2023-10-16 RX ADMIN — METHOCARBAMOL 1000 MG: 500 TABLET ORAL at 14:32

## 2023-10-16 RX ADMIN — PANTOPRAZOLE SODIUM 40 MG: 40 TABLET, DELAYED RELEASE ORAL at 05:14

## 2023-10-16 RX ADMIN — ISOSORBIDE MONONITRATE 30 MG: 30 TABLET, EXTENDED RELEASE ORAL at 08:11

## 2023-10-16 RX ADMIN — ASPIRIN 81 MG: 81 TABLET, CHEWABLE ORAL at 08:11

## 2023-10-16 RX ADMIN — ISOSORBIDE MONONITRATE 30 MG: 30 TABLET, EXTENDED RELEASE ORAL at 20:38

## 2023-10-16 RX ADMIN — CARVEDILOL 25 MG: 25 TABLET, FILM COATED ORAL at 08:11

## 2023-10-16 RX ADMIN — CLOPIDOGREL BISULFATE 75 MG: 75 TABLET ORAL at 08:10

## 2023-10-16 RX ADMIN — BUMETANIDE 0.5 MG: 0.25 INJECTION INTRAMUSCULAR; INTRAVENOUS at 18:19

## 2023-10-16 RX ADMIN — METRONIDAZOLE 500 MG: 5 INJECTION, SOLUTION INTRAVENOUS at 04:10

## 2023-10-16 RX ADMIN — PANTOPRAZOLE SODIUM 40 MG: 40 TABLET, DELAYED RELEASE ORAL at 18:20

## 2023-10-16 ASSESSMENT — PAIN DESCRIPTION - DESCRIPTORS: DESCRIPTORS: DULL;ACHING;TIGHTNESS

## 2023-10-16 ASSESSMENT — PAIN - FUNCTIONAL ASSESSMENT
PAIN_FUNCTIONAL_ASSESSMENT: PREVENTS OR INTERFERES SOME ACTIVE ACTIVITIES AND ADLS
PAIN_FUNCTIONAL_ASSESSMENT: PREVENTS OR INTERFERES SOME ACTIVE ACTIVITIES AND ADLS

## 2023-10-16 ASSESSMENT — PAIN SCALES - GENERAL
PAINLEVEL_OUTOF10: 7
PAINLEVEL_OUTOF10: 8

## 2023-10-16 ASSESSMENT — PAIN DESCRIPTION - ORIENTATION: ORIENTATION: MID

## 2023-10-16 ASSESSMENT — PAIN DESCRIPTION - LOCATION: LOCATION: ABDOMEN

## 2023-10-17 ENCOUNTER — APPOINTMENT (OUTPATIENT)
Dept: GENERAL RADIOLOGY | Age: 73
DRG: 853 | End: 2023-10-17
Attending: INTERNAL MEDICINE
Payer: MEDICARE

## 2023-10-17 LAB
ANION GAP SERPL CALCULATED.3IONS-SCNC: 15 MMOL/L (ref 7–19)
BASOPHILS # BLD: 0.1 K/UL (ref 0–0.2)
BASOPHILS NFR BLD: 0.4 % (ref 0–1)
BUN SERPL-MCNC: 9 MG/DL (ref 8–23)
CALCIUM SERPL-MCNC: 7.4 MG/DL (ref 8.8–10.2)
CHLORIDE SERPL-SCNC: 98 MMOL/L (ref 98–111)
CO2 SERPL-SCNC: 25 MMOL/L (ref 22–29)
CREAT SERPL-MCNC: 0.7 MG/DL (ref 0.5–0.9)
EOSINOPHIL # BLD: 0.2 K/UL (ref 0–0.6)
EOSINOPHIL NFR BLD: 1.8 % (ref 0–5)
ERYTHROCYTE [DISTWIDTH] IN BLOOD BY AUTOMATED COUNT: 18.6 % (ref 11.5–14.5)
GLUCOSE BLD-MCNC: 128 MG/DL (ref 70–99)
GLUCOSE BLD-MCNC: 96 MG/DL (ref 70–99)
GLUCOSE BLD-MCNC: 99 MG/DL (ref 70–99)
GLUCOSE BLD-MCNC: 99 MG/DL (ref 70–99)
GLUCOSE SERPL-MCNC: 93 MG/DL (ref 74–109)
HCT VFR BLD AUTO: 23.6 % (ref 37–47)
HGB BLD-MCNC: 7.2 G/DL (ref 12–16)
IMM GRANULOCYTES # BLD: 0.1 K/UL
LYMPHOCYTES # BLD: 1.4 K/UL (ref 1.1–4.5)
LYMPHOCYTES NFR BLD: 11.6 % (ref 20–40)
MAGNESIUM SERPL-MCNC: 1.3 MG/DL (ref 1.6–2.4)
MCH RBC QN AUTO: 25.5 PG (ref 27–31)
MCHC RBC AUTO-ENTMCNC: 30.5 G/DL (ref 33–37)
MCV RBC AUTO: 83.7 FL (ref 81–99)
MONOCYTES # BLD: 1 K/UL (ref 0–0.9)
MONOCYTES NFR BLD: 8.1 % (ref 0–10)
NEUTROPHILS # BLD: 9.4 K/UL (ref 1.5–7.5)
NEUTS SEG NFR BLD: 77.4 % (ref 50–65)
PERFORMED ON: ABNORMAL
PERFORMED ON: NORMAL
PLATELET # BLD AUTO: 321 K/UL (ref 130–400)
PMV BLD AUTO: 9.9 FL (ref 9.4–12.3)
POTASSIUM SERPL-SCNC: 3 MMOL/L (ref 3.5–5)
RBC # BLD AUTO: 2.82 M/UL (ref 4.2–5.4)
SODIUM SERPL-SCNC: 138 MMOL/L (ref 136–145)
WBC # BLD AUTO: 12.1 K/UL (ref 4.8–10.8)

## 2023-10-17 PROCEDURE — 2580000003 HC RX 258: Performed by: SURGERY

## 2023-10-17 PROCEDURE — 6360000002 HC RX W HCPCS: Performed by: SURGERY

## 2023-10-17 PROCEDURE — 2500000003 HC RX 250 WO HCPCS: Performed by: SURGERY

## 2023-10-17 PROCEDURE — 6360000002 HC RX W HCPCS: Performed by: HOSPITALIST

## 2023-10-17 PROCEDURE — 1210000000 HC MED SURG R&B

## 2023-10-17 PROCEDURE — 97166 OT EVAL MOD COMPLEX 45 MIN: CPT

## 2023-10-17 PROCEDURE — 6370000000 HC RX 637 (ALT 250 FOR IP): Performed by: SURGERY

## 2023-10-17 PROCEDURE — 80048 BASIC METABOLIC PNL TOTAL CA: CPT

## 2023-10-17 PROCEDURE — 74018 RADEX ABDOMEN 1 VIEW: CPT

## 2023-10-17 PROCEDURE — 6360000002 HC RX W HCPCS: Performed by: INTERNAL MEDICINE

## 2023-10-17 PROCEDURE — 99024 POSTOP FOLLOW-UP VISIT: CPT | Performed by: SURGERY

## 2023-10-17 PROCEDURE — 36415 COLL VENOUS BLD VENIPUNCTURE: CPT

## 2023-10-17 PROCEDURE — 97535 SELF CARE MNGMENT TRAINING: CPT

## 2023-10-17 PROCEDURE — 85025 COMPLETE CBC W/AUTO DIFF WBC: CPT

## 2023-10-17 PROCEDURE — 83735 ASSAY OF MAGNESIUM: CPT

## 2023-10-17 PROCEDURE — 82962 GLUCOSE BLOOD TEST: CPT

## 2023-10-17 PROCEDURE — 6370000000 HC RX 637 (ALT 250 FOR IP): Performed by: INTERNAL MEDICINE

## 2023-10-17 PROCEDURE — 94760 N-INVAS EAR/PLS OXIMETRY 1: CPT

## 2023-10-17 RX ORDER — ZOLPIDEM TARTRATE 5 MG/1
5 TABLET ORAL NIGHTLY PRN
Status: DISCONTINUED | OUTPATIENT
Start: 2023-10-17 | End: 2023-10-22 | Stop reason: HOSPADM

## 2023-10-17 RX ADMIN — METOPROLOL TARTRATE 5 MG: 5 INJECTION INTRAVENOUS at 09:45

## 2023-10-17 RX ADMIN — ONDANSETRON 4 MG: 2 INJECTION INTRAMUSCULAR; INTRAVENOUS at 09:46

## 2023-10-17 RX ADMIN — CARVEDILOL 12.5 MG: 12.5 TABLET, FILM COATED ORAL at 18:34

## 2023-10-17 RX ADMIN — PANTOPRAZOLE SODIUM 40 MG: 40 TABLET, DELAYED RELEASE ORAL at 09:46

## 2023-10-17 RX ADMIN — AMLODIPINE BESYLATE 5 MG: 5 TABLET ORAL at 09:46

## 2023-10-17 RX ADMIN — CLOPIDOGREL BISULFATE 75 MG: 75 TABLET ORAL at 09:46

## 2023-10-17 RX ADMIN — CARVEDILOL 12.5 MG: 12.5 TABLET, FILM COATED ORAL at 09:46

## 2023-10-17 RX ADMIN — METOPROLOL TARTRATE 5 MG: 5 INJECTION INTRAVENOUS at 14:45

## 2023-10-17 RX ADMIN — MORPHINE SULFATE 1 MG: 2 INJECTION, SOLUTION INTRAMUSCULAR; INTRAVENOUS at 12:14

## 2023-10-17 RX ADMIN — POTASSIUM CHLORIDE 10 MEQ: 10 INJECTION, SOLUTION INTRAVENOUS at 14:44

## 2023-10-17 RX ADMIN — PROMETHAZINE HYDROCHLORIDE 12.5 MG: 12.5 TABLET ORAL at 03:56

## 2023-10-17 RX ADMIN — MAGNESIUM SULFATE HEPTAHYDRATE 2000 MG: 2 INJECTION, SOLUTION INTRAVENOUS at 14:44

## 2023-10-17 RX ADMIN — METHOCARBAMOL 1000 MG: 500 TABLET ORAL at 09:46

## 2023-10-17 RX ADMIN — SODIUM CHLORIDE, PRESERVATIVE FREE 10 ML: 5 INJECTION INTRAVENOUS at 09:00

## 2023-10-17 RX ADMIN — METHOCARBAMOL 1000 MG: 500 TABLET ORAL at 14:45

## 2023-10-17 RX ADMIN — POTASSIUM CHLORIDE 10 MEQ: 10 INJECTION, SOLUTION INTRAVENOUS at 19:40

## 2023-10-17 RX ADMIN — MORPHINE SULFATE 1 MG: 2 INJECTION, SOLUTION INTRAMUSCULAR; INTRAVENOUS at 04:59

## 2023-10-17 RX ADMIN — METRONIDAZOLE 500 MG: 5 INJECTION, SOLUTION INTRAVENOUS at 03:58

## 2023-10-17 RX ADMIN — METOPROLOL TARTRATE 5 MG: 5 INJECTION INTRAVENOUS at 21:02

## 2023-10-17 RX ADMIN — HYDROCODONE BITARTRATE AND ACETAMINOPHEN 1 TABLET: 7.5; 325 TABLET ORAL at 03:56

## 2023-10-17 RX ADMIN — HYDROCODONE BITARTRATE AND ACETAMINOPHEN 1 TABLET: 7.5; 325 TABLET ORAL at 09:46

## 2023-10-17 RX ADMIN — ONDANSETRON 4 MG: 2 INJECTION INTRAMUSCULAR; INTRAVENOUS at 01:14

## 2023-10-17 RX ADMIN — METRONIDAZOLE 500 MG: 5 INJECTION, SOLUTION INTRAVENOUS at 12:18

## 2023-10-17 RX ADMIN — ASPIRIN 81 MG: 81 TABLET, CHEWABLE ORAL at 09:46

## 2023-10-17 RX ADMIN — METHOCARBAMOL 1000 MG: 500 TABLET ORAL at 21:02

## 2023-10-17 RX ADMIN — POTASSIUM CHLORIDE 10 MEQ: 10 INJECTION, SOLUTION INTRAVENOUS at 22:38

## 2023-10-17 RX ADMIN — SODIUM CHLORIDE, SODIUM LACTATE, POTASSIUM CHLORIDE, AND CALCIUM CHLORIDE: 600; 310; 30; 20 INJECTION, SOLUTION INTRAVENOUS at 09:44

## 2023-10-17 RX ADMIN — ISOSORBIDE MONONITRATE 30 MG: 30 TABLET, EXTENDED RELEASE ORAL at 09:46

## 2023-10-17 RX ADMIN — HYDROXYZINE HYDROCHLORIDE 25 MG: 25 INJECTION, SOLUTION INTRAMUSCULAR at 01:48

## 2023-10-17 RX ADMIN — ROSUVASTATIN CALCIUM 20 MG: 20 TABLET, COATED ORAL at 21:02

## 2023-10-17 RX ADMIN — ISOSORBIDE MONONITRATE 30 MG: 30 TABLET, EXTENDED RELEASE ORAL at 21:02

## 2023-10-17 RX ADMIN — MORPHINE SULFATE 1 MG: 2 INJECTION, SOLUTION INTRAMUSCULAR; INTRAVENOUS at 18:36

## 2023-10-17 RX ADMIN — PANTOPRAZOLE SODIUM 40 MG: 40 TABLET, DELAYED RELEASE ORAL at 18:39

## 2023-10-17 RX ADMIN — METRONIDAZOLE 500 MG: 5 INJECTION, SOLUTION INTRAVENOUS at 21:37

## 2023-10-17 RX ADMIN — ZOLPIDEM TARTRATE 5 MG: 5 TABLET ORAL at 21:03

## 2023-10-17 ASSESSMENT — PAIN DESCRIPTION - ORIENTATION
ORIENTATION: ANTERIOR
ORIENTATION: MID
ORIENTATION: ANTERIOR
ORIENTATION: ANTERIOR

## 2023-10-17 ASSESSMENT — PAIN SCALES - GENERAL
PAINLEVEL_OUTOF10: 8
PAINLEVEL_OUTOF10: 3
PAINLEVEL_OUTOF10: 6
PAINLEVEL_OUTOF10: 7
PAINLEVEL_OUTOF10: 7
PAINLEVEL_OUTOF10: 6

## 2023-10-17 ASSESSMENT — PAIN DESCRIPTION - LOCATION
LOCATION: ABDOMEN
LOCATION: BACK

## 2023-10-17 ASSESSMENT — PAIN DESCRIPTION - DESCRIPTORS
DESCRIPTORS: PRESSURE
DESCRIPTORS: THROBBING
DESCRIPTORS: PRESSURE;THROBBING
DESCRIPTORS: STABBING;SHARP
DESCRIPTORS: ACHING

## 2023-10-18 LAB
ANION GAP SERPL CALCULATED.3IONS-SCNC: 18 MMOL/L (ref 7–19)
BASOPHILS # BLD: 0 K/UL (ref 0–0.2)
BASOPHILS NFR BLD: 0.3 % (ref 0–1)
BUN SERPL-MCNC: 8 MG/DL (ref 8–23)
CALCIUM SERPL-MCNC: 7.1 MG/DL (ref 8.8–10.2)
CHLORIDE SERPL-SCNC: 97 MMOL/L (ref 98–111)
CO2 SERPL-SCNC: 23 MMOL/L (ref 22–29)
CREAT SERPL-MCNC: 0.9 MG/DL (ref 0.5–0.9)
EOSINOPHIL # BLD: 0.3 K/UL (ref 0–0.6)
EOSINOPHIL NFR BLD: 2 % (ref 0–5)
ERYTHROCYTE [DISTWIDTH] IN BLOOD BY AUTOMATED COUNT: 18.6 % (ref 11.5–14.5)
GLUCOSE BLD-MCNC: 100 MG/DL (ref 70–99)
GLUCOSE BLD-MCNC: 90 MG/DL (ref 70–99)
GLUCOSE BLD-MCNC: 91 MG/DL (ref 70–99)
GLUCOSE BLD-MCNC: 96 MG/DL (ref 70–99)
GLUCOSE SERPL-MCNC: 79 MG/DL (ref 74–109)
HCT VFR BLD AUTO: 23.7 % (ref 37–47)
HEMOCCULT SP1 STL QL: NORMAL
HGB BLD-MCNC: 7 G/DL (ref 12–16)
IMM GRANULOCYTES # BLD: 0.1 K/UL
LYMPHOCYTES # BLD: 1.5 K/UL (ref 1.1–4.5)
LYMPHOCYTES NFR BLD: 11.8 % (ref 20–40)
MAGNESIUM SERPL-MCNC: 1.8 MG/DL (ref 1.6–2.4)
MCH RBC QN AUTO: 25.1 PG (ref 27–31)
MCHC RBC AUTO-ENTMCNC: 29.5 G/DL (ref 33–37)
MCV RBC AUTO: 84.9 FL (ref 81–99)
MONOCYTES # BLD: 0.9 K/UL (ref 0–0.9)
MONOCYTES NFR BLD: 7.3 % (ref 0–10)
NEUTROPHILS # BLD: 9.8 K/UL (ref 1.5–7.5)
NEUTS SEG NFR BLD: 78 % (ref 50–65)
PERFORMED ON: ABNORMAL
PERFORMED ON: NORMAL
PLATELET # BLD AUTO: 329 K/UL (ref 130–400)
PMV BLD AUTO: 10.5 FL (ref 9.4–12.3)
POTASSIUM SERPL-SCNC: 3.5 MMOL/L (ref 3.5–5)
RBC # BLD AUTO: 2.79 M/UL (ref 4.2–5.4)
SODIUM SERPL-SCNC: 138 MMOL/L (ref 136–145)
WBC # BLD AUTO: 12.6 K/UL (ref 4.8–10.8)

## 2023-10-18 PROCEDURE — 99024 POSTOP FOLLOW-UP VISIT: CPT | Performed by: SURGERY

## 2023-10-18 PROCEDURE — 83735 ASSAY OF MAGNESIUM: CPT

## 2023-10-18 PROCEDURE — 6360000002 HC RX W HCPCS: Performed by: SURGERY

## 2023-10-18 PROCEDURE — 6370000000 HC RX 637 (ALT 250 FOR IP): Performed by: SURGERY

## 2023-10-18 PROCEDURE — 94760 N-INVAS EAR/PLS OXIMETRY 1: CPT

## 2023-10-18 PROCEDURE — 1210000000 HC MED SURG R&B

## 2023-10-18 PROCEDURE — 82962 GLUCOSE BLOOD TEST: CPT

## 2023-10-18 PROCEDURE — 6370000000 HC RX 637 (ALT 250 FOR IP): Performed by: INTERNAL MEDICINE

## 2023-10-18 PROCEDURE — 85025 COMPLETE CBC W/AUTO DIFF WBC: CPT

## 2023-10-18 PROCEDURE — 2500000003 HC RX 250 WO HCPCS: Performed by: SURGERY

## 2023-10-18 PROCEDURE — 80048 BASIC METABOLIC PNL TOTAL CA: CPT

## 2023-10-18 PROCEDURE — 6370000000 HC RX 637 (ALT 250 FOR IP): Performed by: HOSPITALIST

## 2023-10-18 PROCEDURE — 2700000000 HC OXYGEN THERAPY PER DAY

## 2023-10-18 PROCEDURE — 97116 GAIT TRAINING THERAPY: CPT

## 2023-10-18 PROCEDURE — 97535 SELF CARE MNGMENT TRAINING: CPT

## 2023-10-18 PROCEDURE — 36415 COLL VENOUS BLD VENIPUNCTURE: CPT

## 2023-10-18 PROCEDURE — 82270 OCCULT BLOOD FECES: CPT

## 2023-10-18 RX ORDER — BISACODYL 10 MG
10 SUPPOSITORY, RECTAL RECTAL DAILY PRN
Status: DISCONTINUED | OUTPATIENT
Start: 2023-10-18 | End: 2023-10-22 | Stop reason: HOSPADM

## 2023-10-18 RX ADMIN — ISOSORBIDE MONONITRATE 30 MG: 30 TABLET, EXTENDED RELEASE ORAL at 09:18

## 2023-10-18 RX ADMIN — CARVEDILOL 12.5 MG: 12.5 TABLET, FILM COATED ORAL at 17:22

## 2023-10-18 RX ADMIN — HYDROCODONE BITARTRATE AND ACETAMINOPHEN 1 TABLET: 7.5; 325 TABLET ORAL at 22:55

## 2023-10-18 RX ADMIN — PROMETHAZINE HYDROCHLORIDE 12.5 MG: 12.5 TABLET ORAL at 17:32

## 2023-10-18 RX ADMIN — METHOCARBAMOL 1000 MG: 500 TABLET ORAL at 16:23

## 2023-10-18 RX ADMIN — HYDROCODONE BITARTRATE AND ACETAMINOPHEN 1 TABLET: 7.5; 325 TABLET ORAL at 16:22

## 2023-10-18 RX ADMIN — METOPROLOL TARTRATE 5 MG: 5 INJECTION INTRAVENOUS at 19:53

## 2023-10-18 RX ADMIN — POTASSIUM CHLORIDE 10 MEQ: 10 INJECTION, SOLUTION INTRAVENOUS at 00:32

## 2023-10-18 RX ADMIN — METRONIDAZOLE 500 MG: 5 INJECTION, SOLUTION INTRAVENOUS at 04:46

## 2023-10-18 RX ADMIN — METHOCARBAMOL 1000 MG: 500 TABLET ORAL at 20:46

## 2023-10-18 RX ADMIN — BUSPIRONE HYDROCHLORIDE 5 MG: 5 TABLET ORAL at 09:54

## 2023-10-18 RX ADMIN — ROSUVASTATIN CALCIUM 20 MG: 20 TABLET, COATED ORAL at 20:46

## 2023-10-18 RX ADMIN — CLOPIDOGREL BISULFATE 75 MG: 75 TABLET ORAL at 09:18

## 2023-10-18 RX ADMIN — CARVEDILOL 12.5 MG: 12.5 TABLET, FILM COATED ORAL at 09:18

## 2023-10-18 RX ADMIN — BISACODYL 10 MG: 10 SUPPOSITORY RECTAL at 17:22

## 2023-10-18 RX ADMIN — PANTOPRAZOLE SODIUM 40 MG: 40 TABLET, DELAYED RELEASE ORAL at 16:23

## 2023-10-18 RX ADMIN — METHOCARBAMOL 1000 MG: 500 TABLET ORAL at 09:18

## 2023-10-18 RX ADMIN — METOPROLOL TARTRATE 5 MG: 5 INJECTION INTRAVENOUS at 00:32

## 2023-10-18 RX ADMIN — ENOXAPARIN SODIUM 40 MG: 100 INJECTION SUBCUTANEOUS at 09:19

## 2023-10-18 RX ADMIN — METRONIDAZOLE 500 MG: 5 INJECTION, SOLUTION INTRAVENOUS at 13:06

## 2023-10-18 RX ADMIN — METRONIDAZOLE 500 MG: 5 INJECTION, SOLUTION INTRAVENOUS at 19:59

## 2023-10-18 RX ADMIN — AMLODIPINE BESYLATE 5 MG: 5 TABLET ORAL at 09:18

## 2023-10-18 RX ADMIN — PANTOPRAZOLE SODIUM 40 MG: 40 TABLET, DELAYED RELEASE ORAL at 08:31

## 2023-10-18 RX ADMIN — ASPIRIN 81 MG: 81 TABLET, CHEWABLE ORAL at 09:18

## 2023-10-18 RX ADMIN — ISOSORBIDE MONONITRATE 30 MG: 30 TABLET, EXTENDED RELEASE ORAL at 20:46

## 2023-10-18 ASSESSMENT — PAIN DESCRIPTION - ORIENTATION
ORIENTATION: MID

## 2023-10-18 ASSESSMENT — PAIN DESCRIPTION - DESCRIPTORS
DESCRIPTORS: ACHING
DESCRIPTORS: TENDER;DISCOMFORT
DESCRIPTORS: PRESSURE

## 2023-10-18 ASSESSMENT — PAIN DESCRIPTION - LOCATION
LOCATION: ABDOMEN

## 2023-10-18 ASSESSMENT — PAIN SCALES - GENERAL
PAINLEVEL_OUTOF10: 6
PAINLEVEL_OUTOF10: 5
PAINLEVEL_OUTOF10: 5

## 2023-10-18 ASSESSMENT — PAIN DESCRIPTION - FREQUENCY: FREQUENCY: CONTINUOUS

## 2023-10-18 ASSESSMENT — PAIN DESCRIPTION - PAIN TYPE: TYPE: ACUTE PAIN

## 2023-10-19 LAB
ANION GAP SERPL CALCULATED.3IONS-SCNC: 17 MMOL/L (ref 7–19)
BASOPHILS # BLD: 0.1 K/UL (ref 0–0.2)
BASOPHILS NFR BLD: 0.5 % (ref 0–1)
BUN SERPL-MCNC: 8 MG/DL (ref 8–23)
CALCIUM SERPL-MCNC: 7.5 MG/DL (ref 8.8–10.2)
CHLORIDE SERPL-SCNC: 99 MMOL/L (ref 98–111)
CO2 SERPL-SCNC: 20 MMOL/L (ref 22–29)
CREAT SERPL-MCNC: 0.8 MG/DL (ref 0.5–0.9)
EOSINOPHIL # BLD: 0.2 K/UL (ref 0–0.6)
EOSINOPHIL NFR BLD: 2.1 % (ref 0–5)
ERYTHROCYTE [DISTWIDTH] IN BLOOD BY AUTOMATED COUNT: 19 % (ref 11.5–14.5)
GLUCOSE BLD-MCNC: 87 MG/DL (ref 70–99)
GLUCOSE BLD-MCNC: 89 MG/DL (ref 70–99)
GLUCOSE BLD-MCNC: 94 MG/DL (ref 70–99)
GLUCOSE BLD-MCNC: 94 MG/DL (ref 70–99)
GLUCOSE SERPL-MCNC: 77 MG/DL (ref 74–109)
HCT VFR BLD AUTO: 23.3 % (ref 37–47)
HGB BLD-MCNC: 7 G/DL (ref 12–16)
IMM GRANULOCYTES # BLD: 0.1 K/UL
LYMPHOCYTES # BLD: 1.3 K/UL (ref 1.1–4.5)
LYMPHOCYTES NFR BLD: 11.5 % (ref 20–40)
MAGNESIUM SERPL-MCNC: 1.5 MG/DL (ref 1.6–2.4)
MCH RBC QN AUTO: 25.4 PG (ref 27–31)
MCHC RBC AUTO-ENTMCNC: 30 G/DL (ref 33–37)
MCV RBC AUTO: 84.4 FL (ref 81–99)
MONOCYTES # BLD: 0.7 K/UL (ref 0–0.9)
MONOCYTES NFR BLD: 6.8 % (ref 0–10)
NEUTROPHILS # BLD: 8.6 K/UL (ref 1.5–7.5)
NEUTS SEG NFR BLD: 78.2 % (ref 50–65)
PERFORMED ON: NORMAL
PLATELET # BLD AUTO: 337 K/UL (ref 130–400)
PMV BLD AUTO: 9.7 FL (ref 9.4–12.3)
POTASSIUM SERPL-SCNC: 3 MMOL/L (ref 3.5–5)
POTASSIUM SERPL-SCNC: 3 MMOL/L (ref 3.5–5)
RBC # BLD AUTO: 2.76 M/UL (ref 4.2–5.4)
SODIUM SERPL-SCNC: 136 MMOL/L (ref 136–145)
WBC # BLD AUTO: 11 K/UL (ref 4.8–10.8)

## 2023-10-19 PROCEDURE — 6360000002 HC RX W HCPCS: Performed by: INTERNAL MEDICINE

## 2023-10-19 PROCEDURE — 6370000000 HC RX 637 (ALT 250 FOR IP): Performed by: HOSPITALIST

## 2023-10-19 PROCEDURE — 99024 POSTOP FOLLOW-UP VISIT: CPT | Performed by: SURGERY

## 2023-10-19 PROCEDURE — 2580000003 HC RX 258: Performed by: SURGERY

## 2023-10-19 PROCEDURE — 83735 ASSAY OF MAGNESIUM: CPT

## 2023-10-19 PROCEDURE — 85025 COMPLETE CBC W/AUTO DIFF WBC: CPT

## 2023-10-19 PROCEDURE — 99232 SBSQ HOSP IP/OBS MODERATE 35: CPT | Performed by: INTERNAL MEDICINE

## 2023-10-19 PROCEDURE — 6370000000 HC RX 637 (ALT 250 FOR IP): Performed by: SURGERY

## 2023-10-19 PROCEDURE — 6360000002 HC RX W HCPCS: Performed by: SURGERY

## 2023-10-19 PROCEDURE — 80048 BASIC METABOLIC PNL TOTAL CA: CPT

## 2023-10-19 PROCEDURE — 97116 GAIT TRAINING THERAPY: CPT

## 2023-10-19 PROCEDURE — 6370000000 HC RX 637 (ALT 250 FOR IP): Performed by: INTERNAL MEDICINE

## 2023-10-19 PROCEDURE — 1210000000 HC MED SURG R&B

## 2023-10-19 PROCEDURE — 2500000003 HC RX 250 WO HCPCS: Performed by: SURGERY

## 2023-10-19 PROCEDURE — 94760 N-INVAS EAR/PLS OXIMETRY 1: CPT

## 2023-10-19 PROCEDURE — 36415 COLL VENOUS BLD VENIPUNCTURE: CPT

## 2023-10-19 PROCEDURE — 82962 GLUCOSE BLOOD TEST: CPT

## 2023-10-19 RX ORDER — DOCUSATE SODIUM 100 MG/1
100 CAPSULE, LIQUID FILLED ORAL DAILY
Status: DISCONTINUED | OUTPATIENT
Start: 2023-10-19 | End: 2023-10-22 | Stop reason: HOSPADM

## 2023-10-19 RX ORDER — POLYETHYLENE GLYCOL 3350 17 G/17G
17 POWDER, FOR SOLUTION ORAL DAILY
Status: DISCONTINUED | OUTPATIENT
Start: 2023-10-19 | End: 2023-10-22 | Stop reason: HOSPADM

## 2023-10-19 RX ORDER — FUROSEMIDE 10 MG/ML
20 INJECTION INTRAMUSCULAR; INTRAVENOUS ONCE
Status: COMPLETED | OUTPATIENT
Start: 2023-10-19 | End: 2023-10-19

## 2023-10-19 RX ADMIN — MORPHINE SULFATE 1 MG: 2 INJECTION, SOLUTION INTRAMUSCULAR; INTRAVENOUS at 09:17

## 2023-10-19 RX ADMIN — POLYETHYLENE GLYCOL 3350 17 G: 17 POWDER, FOR SOLUTION ORAL at 18:06

## 2023-10-19 RX ADMIN — PANTOPRAZOLE SODIUM 40 MG: 40 TABLET, DELAYED RELEASE ORAL at 16:45

## 2023-10-19 RX ADMIN — SODIUM CHLORIDE, SODIUM LACTATE, POTASSIUM CHLORIDE, AND CALCIUM CHLORIDE: 600; 310; 30; 20 INJECTION, SOLUTION INTRAVENOUS at 19:30

## 2023-10-19 RX ADMIN — CLOPIDOGREL BISULFATE 75 MG: 75 TABLET ORAL at 09:18

## 2023-10-19 RX ADMIN — METOPROLOL TARTRATE 5 MG: 5 INJECTION INTRAVENOUS at 09:17

## 2023-10-19 RX ADMIN — MORPHINE SULFATE 1 MG: 2 INJECTION, SOLUTION INTRAMUSCULAR; INTRAVENOUS at 18:12

## 2023-10-19 RX ADMIN — SODIUM CHLORIDE, PRESERVATIVE FREE 10 ML: 5 INJECTION INTRAVENOUS at 09:00

## 2023-10-19 RX ADMIN — METHOCARBAMOL 1000 MG: 500 TABLET ORAL at 09:18

## 2023-10-19 RX ADMIN — PANTOPRAZOLE SODIUM 40 MG: 40 TABLET, DELAYED RELEASE ORAL at 05:37

## 2023-10-19 RX ADMIN — ROSUVASTATIN CALCIUM 20 MG: 20 TABLET, COATED ORAL at 19:34

## 2023-10-19 RX ADMIN — HYDROCODONE BITARTRATE AND ACETAMINOPHEN 1 TABLET: 7.5; 325 TABLET ORAL at 13:38

## 2023-10-19 RX ADMIN — METRONIDAZOLE 500 MG: 5 INJECTION, SOLUTION INTRAVENOUS at 03:49

## 2023-10-19 RX ADMIN — CARVEDILOL 12.5 MG: 12.5 TABLET, FILM COATED ORAL at 09:18

## 2023-10-19 RX ADMIN — METOPROLOL TARTRATE 5 MG: 5 INJECTION INTRAVENOUS at 13:35

## 2023-10-19 RX ADMIN — METOPROLOL TARTRATE 5 MG: 5 INJECTION INTRAVENOUS at 01:59

## 2023-10-19 RX ADMIN — HYDROCODONE BITARTRATE AND ACETAMINOPHEN 1 TABLET: 7.5; 325 TABLET ORAL at 19:33

## 2023-10-19 RX ADMIN — METHOCARBAMOL 1000 MG: 500 TABLET ORAL at 13:35

## 2023-10-19 RX ADMIN — METRONIDAZOLE 500 MG: 5 INJECTION, SOLUTION INTRAVENOUS at 13:34

## 2023-10-19 RX ADMIN — AMLODIPINE BESYLATE 5 MG: 5 TABLET ORAL at 09:18

## 2023-10-19 RX ADMIN — SODIUM CHLORIDE, PRESERVATIVE FREE 10 ML: 5 INJECTION INTRAVENOUS at 20:37

## 2023-10-19 RX ADMIN — ASPIRIN 81 MG: 81 TABLET, CHEWABLE ORAL at 09:18

## 2023-10-19 RX ADMIN — BUSPIRONE HYDROCHLORIDE 5 MG: 5 TABLET ORAL at 13:38

## 2023-10-19 RX ADMIN — MAGNESIUM SULFATE HEPTAHYDRATE 2000 MG: 2 INJECTION, SOLUTION INTRAVENOUS at 05:06

## 2023-10-19 RX ADMIN — METHOCARBAMOL 1000 MG: 500 TABLET ORAL at 19:34

## 2023-10-19 RX ADMIN — HYDROCODONE BITARTRATE AND ACETAMINOPHEN 1 TABLET: 7.5; 325 TABLET ORAL at 04:47

## 2023-10-19 RX ADMIN — CARVEDILOL 12.5 MG: 12.5 TABLET, FILM COATED ORAL at 16:45

## 2023-10-19 RX ADMIN — DOCUSATE SODIUM 100 MG: 100 CAPSULE, LIQUID FILLED ORAL at 18:06

## 2023-10-19 RX ADMIN — FUROSEMIDE 20 MG: 10 INJECTION, SOLUTION INTRAMUSCULAR; INTRAVENOUS at 13:34

## 2023-10-19 RX ADMIN — ISOSORBIDE MONONITRATE 30 MG: 30 TABLET, EXTENDED RELEASE ORAL at 19:34

## 2023-10-19 RX ADMIN — ISOSORBIDE MONONITRATE 30 MG: 30 TABLET, EXTENDED RELEASE ORAL at 09:19

## 2023-10-19 ASSESSMENT — PAIN DESCRIPTION - DESCRIPTORS
DESCRIPTORS: SHARP;STABBING
DESCRIPTORS: ACHING
DESCRIPTORS: SHARP
DESCRIPTORS: ACHING
DESCRIPTORS: SHARP
DESCRIPTORS: ACHING

## 2023-10-19 ASSESSMENT — PAIN SCALES - GENERAL
PAINLEVEL_OUTOF10: 6
PAINLEVEL_OUTOF10: 8
PAINLEVEL_OUTOF10: 7
PAINLEVEL_OUTOF10: 6
PAINLEVEL_OUTOF10: 6
PAINLEVEL_OUTOF10: 7
PAINLEVEL_OUTOF10: 5
PAINLEVEL_OUTOF10: 6
PAINLEVEL_OUTOF10: 7

## 2023-10-19 ASSESSMENT — PAIN DESCRIPTION - ORIENTATION
ORIENTATION: ANTERIOR
ORIENTATION: MID
ORIENTATION: ANTERIOR
ORIENTATION: MID
ORIENTATION: ANTERIOR;LOWER

## 2023-10-19 ASSESSMENT — PAIN DESCRIPTION - LOCATION
LOCATION: ABDOMEN

## 2023-10-19 ASSESSMENT — PAIN DESCRIPTION - FREQUENCY: FREQUENCY: CONTINUOUS

## 2023-10-19 ASSESSMENT — PAIN SCALES - WONG BAKER: WONGBAKER_NUMERICALRESPONSE: 0

## 2023-10-19 ASSESSMENT — PAIN DESCRIPTION - PAIN TYPE: TYPE: ACUTE PAIN

## 2023-10-19 ASSESSMENT — PAIN - FUNCTIONAL ASSESSMENT: PAIN_FUNCTIONAL_ASSESSMENT: PREVENTS OR INTERFERES SOME ACTIVE ACTIVITIES AND ADLS

## 2023-10-20 LAB
ANION GAP SERPL CALCULATED.3IONS-SCNC: 19 MMOL/L (ref 7–19)
BASOPHILS # BLD: 0 K/UL (ref 0–0.2)
BASOPHILS NFR BLD: 0.4 % (ref 0–1)
BUN SERPL-MCNC: 7 MG/DL (ref 8–23)
CALCIUM SERPL-MCNC: 7.6 MG/DL (ref 8.8–10.2)
CHLORIDE SERPL-SCNC: 97 MMOL/L (ref 98–111)
CO2 SERPL-SCNC: 20 MMOL/L (ref 22–29)
CREAT SERPL-MCNC: 0.8 MG/DL (ref 0.5–0.9)
EOSINOPHIL # BLD: 0.3 K/UL (ref 0–0.6)
EOSINOPHIL NFR BLD: 3.2 % (ref 0–5)
ERYTHROCYTE [DISTWIDTH] IN BLOOD BY AUTOMATED COUNT: 18.6 % (ref 11.5–14.5)
FOLATE SERPL-MCNC: 12.1 NG/ML (ref 4.8–37.3)
GLUCOSE BLD-MCNC: 81 MG/DL (ref 70–99)
GLUCOSE BLD-MCNC: 88 MG/DL (ref 70–99)
GLUCOSE BLD-MCNC: 91 MG/DL (ref 70–99)
GLUCOSE SERPL-MCNC: 85 MG/DL (ref 74–109)
HCT VFR BLD AUTO: 23.4 % (ref 37–47)
HCT VFR BLD AUTO: 24.5 % (ref 37–47)
HGB BLD-MCNC: 7.5 G/DL (ref 12–16)
IMM GRANULOCYTES # BLD: 0.1 K/UL
LYMPHOCYTES # BLD: 1.1 K/UL (ref 1.1–4.5)
LYMPHOCYTES NFR BLD: 11.8 % (ref 20–40)
MAGNESIUM SERPL-MCNC: 1.6 MG/DL (ref 1.6–2.4)
MCH RBC QN AUTO: 25.6 PG (ref 27–31)
MCHC RBC AUTO-ENTMCNC: 30.6 G/DL (ref 33–37)
MCV RBC AUTO: 83.6 FL (ref 81–99)
MONOCYTES # BLD: 0.6 K/UL (ref 0–0.9)
MONOCYTES NFR BLD: 6.5 % (ref 0–10)
NEUTROPHILS # BLD: 7.1 K/UL (ref 1.5–7.5)
NEUTS SEG NFR BLD: 77.3 % (ref 50–65)
PERFORMED ON: NORMAL
PLATELET # BLD AUTO: 384 K/UL (ref 130–400)
PMV BLD AUTO: 10.2 FL (ref 9.4–12.3)
POTASSIUM SERPL-SCNC: 2.8 MMOL/L (ref 3.5–5)
POTASSIUM SERPL-SCNC: 3.5 MMOL/L (ref 3.5–5)
RBC # BLD AUTO: 2.93 M/UL (ref 4.2–5.4)
RETICS # AUTO: 0.1 M/UL (ref 0.03–0.12)
RETICS/RBC NFR: 3.52 % (ref 0.5–1.5)
SODIUM SERPL-SCNC: 136 MMOL/L (ref 136–145)
VIT B12 SERPL-MCNC: 1084 PG/ML (ref 211–946)
WBC # BLD AUTO: 9.2 K/UL (ref 4.8–10.8)

## 2023-10-20 PROCEDURE — 94760 N-INVAS EAR/PLS OXIMETRY 1: CPT

## 2023-10-20 PROCEDURE — 6360000002 HC RX W HCPCS: Performed by: INTERNAL MEDICINE

## 2023-10-20 PROCEDURE — 80048 BASIC METABOLIC PNL TOTAL CA: CPT

## 2023-10-20 PROCEDURE — 6370000000 HC RX 637 (ALT 250 FOR IP): Performed by: SURGERY

## 2023-10-20 PROCEDURE — 6370000000 HC RX 637 (ALT 250 FOR IP): Performed by: HOSPITALIST

## 2023-10-20 PROCEDURE — 6370000000 HC RX 637 (ALT 250 FOR IP): Performed by: INTERNAL MEDICINE

## 2023-10-20 PROCEDURE — 6360000002 HC RX W HCPCS: Performed by: SURGERY

## 2023-10-20 PROCEDURE — 85045 AUTOMATED RETICULOCYTE COUNT: CPT

## 2023-10-20 PROCEDURE — 83735 ASSAY OF MAGNESIUM: CPT

## 2023-10-20 PROCEDURE — 82962 GLUCOSE BLOOD TEST: CPT

## 2023-10-20 PROCEDURE — 99232 SBSQ HOSP IP/OBS MODERATE 35: CPT | Performed by: INTERNAL MEDICINE

## 2023-10-20 PROCEDURE — 1210000000 HC MED SURG R&B

## 2023-10-20 PROCEDURE — 6360000002 HC RX W HCPCS: Performed by: HOSPITALIST

## 2023-10-20 PROCEDURE — 36415 COLL VENOUS BLD VENIPUNCTURE: CPT

## 2023-10-20 PROCEDURE — 97116 GAIT TRAINING THERAPY: CPT

## 2023-10-20 PROCEDURE — 2500000003 HC RX 250 WO HCPCS: Performed by: SURGERY

## 2023-10-20 PROCEDURE — 82607 VITAMIN B-12: CPT

## 2023-10-20 PROCEDURE — 85025 COMPLETE CBC W/AUTO DIFF WBC: CPT

## 2023-10-20 PROCEDURE — 84132 ASSAY OF SERUM POTASSIUM: CPT

## 2023-10-20 PROCEDURE — 82746 ASSAY OF FOLIC ACID SERUM: CPT

## 2023-10-20 RX ADMIN — BUSPIRONE HYDROCHLORIDE 5 MG: 5 TABLET ORAL at 14:42

## 2023-10-20 RX ADMIN — HYDROCODONE BITARTRATE AND ACETAMINOPHEN 1 TABLET: 7.5; 325 TABLET ORAL at 20:53

## 2023-10-20 RX ADMIN — METOPROLOL TARTRATE 5 MG: 5 INJECTION INTRAVENOUS at 14:44

## 2023-10-20 RX ADMIN — HYDROCODONE BITARTRATE AND ACETAMINOPHEN 1 TABLET: 7.5; 325 TABLET ORAL at 00:34

## 2023-10-20 RX ADMIN — HYDROCODONE BITARTRATE AND ACETAMINOPHEN 1 TABLET: 7.5; 325 TABLET ORAL at 06:16

## 2023-10-20 RX ADMIN — ASPIRIN 81 MG: 81 TABLET, CHEWABLE ORAL at 07:27

## 2023-10-20 RX ADMIN — POTASSIUM CHLORIDE 10 MEQ: 10 INJECTION, SOLUTION INTRAVENOUS at 09:01

## 2023-10-20 RX ADMIN — DOCUSATE SODIUM 100 MG: 100 CAPSULE, LIQUID FILLED ORAL at 07:27

## 2023-10-20 RX ADMIN — POTASSIUM CHLORIDE 10 MEQ: 10 INJECTION, SOLUTION INTRAVENOUS at 10:24

## 2023-10-20 RX ADMIN — AMLODIPINE BESYLATE 5 MG: 5 TABLET ORAL at 07:27

## 2023-10-20 RX ADMIN — PROMETHAZINE HYDROCHLORIDE 12.5 MG: 12.5 TABLET ORAL at 20:53

## 2023-10-20 RX ADMIN — BUSPIRONE HYDROCHLORIDE 5 MG: 5 TABLET ORAL at 20:53

## 2023-10-20 RX ADMIN — METHOCARBAMOL 1000 MG: 500 TABLET ORAL at 07:27

## 2023-10-20 RX ADMIN — PANTOPRAZOLE SODIUM 40 MG: 40 TABLET, DELAYED RELEASE ORAL at 18:27

## 2023-10-20 RX ADMIN — CARVEDILOL 12.5 MG: 12.5 TABLET, FILM COATED ORAL at 07:27

## 2023-10-20 RX ADMIN — CARVEDILOL 12.5 MG: 12.5 TABLET, FILM COATED ORAL at 22:20

## 2023-10-20 RX ADMIN — POTASSIUM CHLORIDE 10 MEQ: 10 INJECTION, SOLUTION INTRAVENOUS at 04:59

## 2023-10-20 RX ADMIN — ISOSORBIDE MONONITRATE 30 MG: 30 TABLET, EXTENDED RELEASE ORAL at 07:27

## 2023-10-20 RX ADMIN — POTASSIUM CHLORIDE 10 MEQ: 10 INJECTION, SOLUTION INTRAVENOUS at 03:53

## 2023-10-20 RX ADMIN — ZOLPIDEM TARTRATE 5 MG: 5 TABLET ORAL at 00:35

## 2023-10-20 RX ADMIN — ISOSORBIDE MONONITRATE 30 MG: 30 TABLET, EXTENDED RELEASE ORAL at 20:53

## 2023-10-20 RX ADMIN — IRON SUCROSE 200 MG: 20 INJECTION, SOLUTION INTRAVENOUS at 10:41

## 2023-10-20 RX ADMIN — BUSPIRONE HYDROCHLORIDE 5 MG: 5 TABLET ORAL at 06:15

## 2023-10-20 RX ADMIN — HYDROXYZINE HYDROCHLORIDE 25 MG: 25 INJECTION, SOLUTION INTRAMUSCULAR at 11:23

## 2023-10-20 RX ADMIN — METHOCARBAMOL 1000 MG: 500 TABLET ORAL at 20:53

## 2023-10-20 RX ADMIN — PANTOPRAZOLE SODIUM 40 MG: 40 TABLET, DELAYED RELEASE ORAL at 06:15

## 2023-10-20 RX ADMIN — CLOPIDOGREL BISULFATE 75 MG: 75 TABLET ORAL at 07:27

## 2023-10-20 RX ADMIN — METHOCARBAMOL 1000 MG: 500 TABLET ORAL at 14:42

## 2023-10-20 RX ADMIN — POTASSIUM CHLORIDE 10 MEQ: 10 INJECTION, SOLUTION INTRAVENOUS at 06:12

## 2023-10-20 RX ADMIN — ROSUVASTATIN CALCIUM 20 MG: 20 TABLET, COATED ORAL at 20:53

## 2023-10-20 RX ADMIN — POLYETHYLENE GLYCOL 3350 17 G: 17 POWDER, FOR SOLUTION ORAL at 07:28

## 2023-10-20 RX ADMIN — ENOXAPARIN SODIUM 40 MG: 100 INJECTION SUBCUTANEOUS at 07:27

## 2023-10-20 RX ADMIN — POTASSIUM CHLORIDE 10 MEQ: 10 INJECTION, SOLUTION INTRAVENOUS at 07:34

## 2023-10-20 ASSESSMENT — PAIN SCALES - GENERAL
PAINLEVEL_OUTOF10: 6
PAINLEVEL_OUTOF10: 8
PAINLEVEL_OUTOF10: 4
PAINLEVEL_OUTOF10: 5

## 2023-10-20 ASSESSMENT — PAIN DESCRIPTION - ORIENTATION
ORIENTATION: ANTERIOR
ORIENTATION: RIGHT

## 2023-10-20 ASSESSMENT — PAIN DESCRIPTION - LOCATION
LOCATION: ABDOMEN;LEG
LOCATION: ABDOMEN

## 2023-10-20 ASSESSMENT — PAIN DESCRIPTION - DESCRIPTORS
DESCRIPTORS: ACHING
DESCRIPTORS: ACHING
DESCRIPTORS: PINS AND NEEDLES
DESCRIPTORS: ACHING
DESCRIPTORS: ACHING

## 2023-10-20 ASSESSMENT — PAIN DESCRIPTION - FREQUENCY: FREQUENCY: CONTINUOUS

## 2023-10-20 ASSESSMENT — PAIN - FUNCTIONAL ASSESSMENT: PAIN_FUNCTIONAL_ASSESSMENT: PREVENTS OR INTERFERES SOME ACTIVE ACTIVITIES AND ADLS

## 2023-10-20 ASSESSMENT — PAIN DESCRIPTION - PAIN TYPE: TYPE: ACUTE PAIN

## 2023-10-21 ENCOUNTER — APPOINTMENT (OUTPATIENT)
Dept: CT IMAGING | Age: 73
DRG: 853 | End: 2023-10-21
Payer: MEDICARE

## 2023-10-21 ENCOUNTER — ANESTHESIA EVENT (OUTPATIENT)
Dept: OPERATING ROOM | Age: 73
End: 2023-10-21
Payer: MEDICARE

## 2023-10-21 ENCOUNTER — ANESTHESIA (OUTPATIENT)
Dept: OPERATING ROOM | Age: 73
End: 2023-10-21
Payer: MEDICARE

## 2023-10-21 PROBLEM — K63.1 BOWEL PERFORATION (HCC): Status: ACTIVE | Noted: 2023-10-06

## 2023-10-21 LAB
ABO/RH: NORMAL
ANION GAP SERPL CALCULATED.3IONS-SCNC: 23 MMOL/L (ref 7–19)
ANTIBODY SCREEN: NORMAL
BASOPHILS # BLD: 0.1 K/UL (ref 0–0.2)
BASOPHILS NFR BLD: 0.3 % (ref 0–1)
BUN SERPL-MCNC: 6 MG/DL (ref 8–23)
CALCIUM SERPL-MCNC: 7.7 MG/DL (ref 8.8–10.2)
CHLORIDE SERPL-SCNC: 95 MMOL/L (ref 98–111)
CO2 SERPL-SCNC: 16 MMOL/L (ref 22–29)
CREAT SERPL-MCNC: 0.7 MG/DL (ref 0.5–0.9)
EOSINOPHIL # BLD: 0.1 K/UL (ref 0–0.6)
EOSINOPHIL NFR BLD: 0.3 % (ref 0–5)
ERYTHROCYTE [DISTWIDTH] IN BLOOD BY AUTOMATED COUNT: 18.6 % (ref 11.5–14.5)
GLUCOSE BLD-MCNC: 108 MG/DL (ref 70–99)
GLUCOSE BLD-MCNC: 75 MG/DL (ref 70–99)
GLUCOSE BLD-MCNC: 89 MG/DL (ref 70–99)
GLUCOSE SERPL-MCNC: 79 MG/DL (ref 74–109)
HCT VFR BLD AUTO: 28.9 % (ref 37–47)
HGB BLD-MCNC: 8.7 G/DL (ref 12–16)
IMM GRANULOCYTES # BLD: 0.1 K/UL
LYMPHOCYTES # BLD: 0.7 K/UL (ref 1.1–4.5)
LYMPHOCYTES NFR BLD: 3.7 % (ref 20–40)
MAGNESIUM SERPL-MCNC: 1.3 MG/DL (ref 1.6–2.4)
MCH RBC QN AUTO: 25.2 PG (ref 27–31)
MCHC RBC AUTO-ENTMCNC: 30.1 G/DL (ref 33–37)
MCV RBC AUTO: 83.8 FL (ref 81–99)
MONOCYTES # BLD: 0.9 K/UL (ref 0–0.9)
MONOCYTES NFR BLD: 4.4 % (ref 0–10)
NEUTROPHILS # BLD: 17.9 K/UL (ref 1.5–7.5)
NEUTS SEG NFR BLD: 90.9 % (ref 50–65)
PERFORMED ON: ABNORMAL
PERFORMED ON: NORMAL
PERFORMED ON: NORMAL
PLATELET # BLD AUTO: 558 K/UL (ref 130–400)
PMV BLD AUTO: 10 FL (ref 9.4–12.3)
POTASSIUM SERPL-SCNC: 3.3 MMOL/L (ref 3.5–5)
RBC # BLD AUTO: 3.45 M/UL (ref 4.2–5.4)
SODIUM SERPL-SCNC: 134 MMOL/L (ref 136–145)
WBC # BLD AUTO: 19.7 K/UL (ref 4.8–10.8)

## 2023-10-21 PROCEDURE — 94002 VENT MGMT INPAT INIT DAY: CPT

## 2023-10-21 PROCEDURE — 3600000015 HC SURGERY LEVEL 5 ADDTL 15MIN: Performed by: SURGERY

## 2023-10-21 PROCEDURE — 44120 REMOVAL OF SMALL INTESTINE: CPT | Performed by: SURGERY

## 2023-10-21 PROCEDURE — 86901 BLOOD TYPING SEROLOGIC RH(D): CPT

## 2023-10-21 PROCEDURE — 2700000000 HC OXYGEN THERAPY PER DAY

## 2023-10-21 PROCEDURE — 6360000004 HC RX CONTRAST MEDICATION: Performed by: SURGERY

## 2023-10-21 PROCEDURE — 6360000002 HC RX W HCPCS: Performed by: INTERNAL MEDICINE

## 2023-10-21 PROCEDURE — 6360000002 HC RX W HCPCS: Performed by: NURSE ANESTHETIST, CERTIFIED REGISTERED

## 2023-10-21 PROCEDURE — 85027 COMPLETE CBC AUTOMATED: CPT

## 2023-10-21 PROCEDURE — 2000000000 HC ICU R&B

## 2023-10-21 PROCEDURE — 83735 ASSAY OF MAGNESIUM: CPT

## 2023-10-21 PROCEDURE — 6360000002 HC RX W HCPCS: Performed by: SURGERY

## 2023-10-21 PROCEDURE — 86900 BLOOD TYPING SEROLOGIC ABO: CPT

## 2023-10-21 PROCEDURE — 6370000000 HC RX 637 (ALT 250 FOR IP): Performed by: SURGERY

## 2023-10-21 PROCEDURE — 74177 CT ABD & PELVIS W/CONTRAST: CPT

## 2023-10-21 PROCEDURE — 80069 RENAL FUNCTION PANEL: CPT

## 2023-10-21 PROCEDURE — 3700000001 HC ADD 15 MINUTES (ANESTHESIA): Performed by: SURGERY

## 2023-10-21 PROCEDURE — 0DB80ZZ EXCISION OF SMALL INTESTINE, OPEN APPROACH: ICD-10-PCS | Performed by: SURGERY

## 2023-10-21 PROCEDURE — 5A1935Z RESPIRATORY VENTILATION, LESS THAN 24 CONSECUTIVE HOURS: ICD-10-PCS | Performed by: HOSPITALIST

## 2023-10-21 PROCEDURE — 3600000005 HC SURGERY LEVEL 5 BASE: Performed by: SURGERY

## 2023-10-21 PROCEDURE — 86850 RBC ANTIBODY SCREEN: CPT

## 2023-10-21 PROCEDURE — 2580000003 HC RX 258: Performed by: SURGERY

## 2023-10-21 PROCEDURE — 85025 COMPLETE CBC W/AUTO DIFF WBC: CPT

## 2023-10-21 PROCEDURE — 2709999900 HC NON-CHARGEABLE SUPPLY: Performed by: SURGERY

## 2023-10-21 PROCEDURE — 2500000003 HC RX 250 WO HCPCS: Performed by: NURSE ANESTHETIST, CERTIFIED REGISTERED

## 2023-10-21 PROCEDURE — 3700000000 HC ANESTHESIA ATTENDED CARE: Performed by: SURGERY

## 2023-10-21 PROCEDURE — 97606 NEG PRS WND THER DME>50 SQCM: CPT | Performed by: SURGERY

## 2023-10-21 PROCEDURE — 2580000003 HC RX 258: Performed by: NURSE ANESTHETIST, CERTIFIED REGISTERED

## 2023-10-21 PROCEDURE — 88307 TISSUE EXAM BY PATHOLOGIST: CPT

## 2023-10-21 PROCEDURE — 94760 N-INVAS EAR/PLS OXIMETRY 1: CPT

## 2023-10-21 PROCEDURE — 82962 GLUCOSE BLOOD TEST: CPT

## 2023-10-21 PROCEDURE — 2720000010 HC SURG SUPPLY STERILE: Performed by: SURGERY

## 2023-10-21 PROCEDURE — 6370000000 HC RX 637 (ALT 250 FOR IP): Performed by: INTERNAL MEDICINE

## 2023-10-21 PROCEDURE — 0BH17EZ INSERTION OF ENDOTRACHEAL AIRWAY INTO TRACHEA, VIA NATURAL OR ARTIFICIAL OPENING: ICD-10-PCS | Performed by: HOSPITALIST

## 2023-10-21 PROCEDURE — 2500000003 HC RX 250 WO HCPCS: Performed by: SURGERY

## 2023-10-21 PROCEDURE — 36415 COLL VENOUS BLD VENIPUNCTURE: CPT

## 2023-10-21 PROCEDURE — 49002 REOPENING OF ABDOMEN: CPT | Performed by: SURGERY

## 2023-10-21 PROCEDURE — 80048 BASIC METABOLIC PNL TOTAL CA: CPT

## 2023-10-21 PROCEDURE — 99024 POSTOP FOLLOW-UP VISIT: CPT | Performed by: SURGERY

## 2023-10-21 RX ORDER — FENTANYL CITRATE-0.9 % NACL/PF 10 MCG/ML
25-200 PLASTIC BAG, INJECTION (ML) INTRAVENOUS CONTINUOUS
Status: DISCONTINUED | OUTPATIENT
Start: 2023-10-21 | End: 2023-10-22 | Stop reason: HOSPADM

## 2023-10-21 RX ORDER — SODIUM CHLORIDE 0.9 % (FLUSH) 0.9 %
5-40 SYRINGE (ML) INJECTION PRN
Status: DISCONTINUED | OUTPATIENT
Start: 2023-10-21 | End: 2023-10-21 | Stop reason: SDUPTHER

## 2023-10-21 RX ORDER — SODIUM CHLORIDE 9 MG/ML
INJECTION, SOLUTION INTRAVENOUS PRN
Status: DISCONTINUED | OUTPATIENT
Start: 2023-10-21 | End: 2023-10-21 | Stop reason: SDUPTHER

## 2023-10-21 RX ORDER — PROPOFOL 10 MG/ML
INJECTION, EMULSION INTRAVENOUS PRN
Status: DISCONTINUED | OUTPATIENT
Start: 2023-10-21 | End: 2023-10-21 | Stop reason: SDUPTHER

## 2023-10-21 RX ORDER — AMLODIPINE BESYLATE 5 MG/1
5 TABLET ORAL DAILY
Qty: 30 TABLET | Refills: 3 | Status: SHIPPED | OUTPATIENT
Start: 2023-10-22

## 2023-10-21 RX ORDER — LIDOCAINE HYDROCHLORIDE 10 MG/ML
INJECTION, SOLUTION INFILTRATION; PERINEURAL PRN
Status: DISCONTINUED | OUTPATIENT
Start: 2023-10-21 | End: 2023-10-21 | Stop reason: SDUPTHER

## 2023-10-21 RX ORDER — PROPOFOL 10 MG/ML
5-50 INJECTION, EMULSION INTRAVENOUS CONTINUOUS
Status: DISCONTINUED | OUTPATIENT
Start: 2023-10-21 | End: 2023-10-22 | Stop reason: HOSPADM

## 2023-10-21 RX ORDER — DEXAMETHASONE SODIUM PHOSPHATE 10 MG/ML
INJECTION, SOLUTION INTRAMUSCULAR; INTRAVENOUS PRN
Status: DISCONTINUED | OUTPATIENT
Start: 2023-10-21 | End: 2023-10-21 | Stop reason: SDUPTHER

## 2023-10-21 RX ORDER — ROCURONIUM BROMIDE 10 MG/ML
INJECTION, SOLUTION INTRAVENOUS PRN
Status: DISCONTINUED | OUTPATIENT
Start: 2023-10-21 | End: 2023-10-21 | Stop reason: SDUPTHER

## 2023-10-21 RX ORDER — SODIUM CHLORIDE, SODIUM LACTATE, POTASSIUM CHLORIDE, CALCIUM CHLORIDE 600; 310; 30; 20 MG/100ML; MG/100ML; MG/100ML; MG/100ML
INJECTION, SOLUTION INTRAVENOUS CONTINUOUS PRN
Status: DISCONTINUED | OUTPATIENT
Start: 2023-10-21 | End: 2023-10-21 | Stop reason: SDUPTHER

## 2023-10-21 RX ORDER — CLOPIDOGREL BISULFATE 75 MG/1
75 TABLET ORAL DAILY
Qty: 30 TABLET | Refills: 3 | Status: SHIPPED | OUTPATIENT
Start: 2023-10-22

## 2023-10-21 RX ORDER — ONDANSETRON 2 MG/ML
4 INJECTION INTRAMUSCULAR; INTRAVENOUS
Status: DISCONTINUED | OUTPATIENT
Start: 2023-10-21 | End: 2023-10-21 | Stop reason: HOSPADM

## 2023-10-21 RX ORDER — HYDROXYZINE HYDROCHLORIDE 25 MG/1
25 TABLET, FILM COATED ORAL EVERY 8 HOURS PRN
Qty: 30 TABLET | Refills: 0 | Status: SHIPPED | OUTPATIENT
Start: 2023-10-21 | End: 2023-10-31

## 2023-10-21 RX ORDER — SODIUM CHLORIDE 9 MG/ML
INJECTION, SOLUTION INTRAVENOUS CONTINUOUS
Status: DISCONTINUED | OUTPATIENT
Start: 2023-10-21 | End: 2023-10-22 | Stop reason: HOSPADM

## 2023-10-21 RX ORDER — SODIUM CHLORIDE 0.9 % (FLUSH) 0.9 %
5-40 SYRINGE (ML) INJECTION PRN
Status: DISCONTINUED | OUTPATIENT
Start: 2023-10-21 | End: 2023-10-22 | Stop reason: HOSPADM

## 2023-10-21 RX ORDER — SODIUM CHLORIDE 0.9 % (FLUSH) 0.9 %
5-40 SYRINGE (ML) INJECTION EVERY 12 HOURS SCHEDULED
Status: DISCONTINUED | OUTPATIENT
Start: 2023-10-21 | End: 2023-10-21 | Stop reason: SDUPTHER

## 2023-10-21 RX ORDER — SODIUM CHLORIDE 9 MG/ML
INJECTION, SOLUTION INTRAVENOUS CONTINUOUS PRN
Status: DISCONTINUED | OUTPATIENT
Start: 2023-10-21 | End: 2023-10-21 | Stop reason: SDUPTHER

## 2023-10-21 RX ORDER — 0.9 % SODIUM CHLORIDE 0.9 %
500 INTRAVENOUS SOLUTION INTRAVENOUS ONCE
Status: COMPLETED | OUTPATIENT
Start: 2023-10-21 | End: 2023-10-21

## 2023-10-21 RX ORDER — FAMOTIDINE 10 MG/ML
INJECTION, SOLUTION INTRAVENOUS PRN
Status: DISCONTINUED | OUTPATIENT
Start: 2023-10-21 | End: 2023-10-21 | Stop reason: SDUPTHER

## 2023-10-21 RX ORDER — FENTANYL CITRATE 50 UG/ML
INJECTION, SOLUTION INTRAMUSCULAR; INTRAVENOUS PRN
Status: DISCONTINUED | OUTPATIENT
Start: 2023-10-21 | End: 2023-10-21 | Stop reason: SDUPTHER

## 2023-10-21 RX ORDER — HYDROMORPHONE HYDROCHLORIDE 1 MG/ML
0.5 INJECTION, SOLUTION INTRAMUSCULAR; INTRAVENOUS; SUBCUTANEOUS EVERY 5 MIN PRN
Status: DISCONTINUED | OUTPATIENT
Start: 2023-10-21 | End: 2023-10-21 | Stop reason: HOSPADM

## 2023-10-21 RX ORDER — ASPIRIN 81 MG/1
81 TABLET, CHEWABLE ORAL DAILY
Qty: 30 TABLET | Refills: 3 | Status: SHIPPED | OUTPATIENT
Start: 2023-10-22

## 2023-10-21 RX ORDER — SODIUM CHLORIDE 0.9 % (FLUSH) 0.9 %
5-40 SYRINGE (ML) INJECTION EVERY 12 HOURS SCHEDULED
Status: DISCONTINUED | OUTPATIENT
Start: 2023-10-21 | End: 2023-10-22 | Stop reason: HOSPADM

## 2023-10-21 RX ORDER — HYDROMORPHONE HYDROCHLORIDE 1 MG/ML
0.25 INJECTION, SOLUTION INTRAMUSCULAR; INTRAVENOUS; SUBCUTANEOUS EVERY 5 MIN PRN
Status: DISCONTINUED | OUTPATIENT
Start: 2023-10-21 | End: 2023-10-21 | Stop reason: HOSPADM

## 2023-10-21 RX ORDER — FAMOTIDINE 20 MG/1
20 TABLET, FILM COATED ORAL 2 TIMES DAILY
Status: DISCONTINUED | OUTPATIENT
Start: 2023-10-21 | End: 2023-10-22 | Stop reason: HOSPADM

## 2023-10-21 RX ORDER — SODIUM CHLORIDE 9 MG/ML
INJECTION, SOLUTION INTRAVENOUS PRN
Status: DISCONTINUED | OUTPATIENT
Start: 2023-10-21 | End: 2023-10-22 | Stop reason: HOSPADM

## 2023-10-21 RX ORDER — MIDAZOLAM HYDROCHLORIDE 1 MG/ML
1-10 INJECTION, SOLUTION INTRAVENOUS CONTINUOUS
Status: DISCONTINUED | OUTPATIENT
Start: 2023-10-21 | End: 2023-10-22 | Stop reason: HOSPADM

## 2023-10-21 RX ADMIN — DEXAMETHASONE SODIUM PHOSPHATE 10 MG: 10 INJECTION, SOLUTION INTRAMUSCULAR; INTRAVENOUS at 18:52

## 2023-10-21 RX ADMIN — PHENYLEPHRINE HYDROCHLORIDE 100 MCG: 10 INJECTION INTRAVENOUS at 18:34

## 2023-10-21 RX ADMIN — FENTANYL CITRATE 100 MCG: 0.05 INJECTION, SOLUTION INTRAMUSCULAR; INTRAVENOUS at 18:23

## 2023-10-21 RX ADMIN — PHENYLEPHRINE HYDROCHLORIDE 125 MCG/MIN: 10 INJECTION INTRAVENOUS at 18:40

## 2023-10-21 RX ADMIN — PANTOPRAZOLE SODIUM 40 MG: 40 TABLET, DELAYED RELEASE ORAL at 11:09

## 2023-10-21 RX ADMIN — METHOCARBAMOL 1000 MG: 500 TABLET ORAL at 11:09

## 2023-10-21 RX ADMIN — SODIUM CHLORIDE, PRESERVATIVE FREE 10 ML: 5 INJECTION INTRAVENOUS at 11:09

## 2023-10-21 RX ADMIN — MORPHINE SULFATE 1 MG: 2 INJECTION, SOLUTION INTRAMUSCULAR; INTRAVENOUS at 03:26

## 2023-10-21 RX ADMIN — ROCURONIUM BROMIDE 70 MG: 10 INJECTION, SOLUTION INTRAVENOUS at 18:24

## 2023-10-21 RX ADMIN — Medication 50 MCG/HR: at 21:42

## 2023-10-21 RX ADMIN — MIDAZOLAM HYDROCHLORIDE 2 MG/HR: 1 INJECTION, SOLUTION INTRAVENOUS at 21:50

## 2023-10-21 RX ADMIN — CARVEDILOL 12.5 MG: 12.5 TABLET, FILM COATED ORAL at 11:09

## 2023-10-21 RX ADMIN — SODIUM CHLORIDE: 9 INJECTION, SOLUTION INTRAVENOUS at 18:18

## 2023-10-21 RX ADMIN — DOCUSATE SODIUM 100 MG: 100 CAPSULE, LIQUID FILLED ORAL at 11:09

## 2023-10-21 RX ADMIN — SODIUM CHLORIDE 500 ML: 9 INJECTION, SOLUTION INTRAVENOUS at 16:50

## 2023-10-21 RX ADMIN — CARVEDILOL 12.5 MG: 12.5 TABLET, FILM COATED ORAL at 16:43

## 2023-10-21 RX ADMIN — PANTOPRAZOLE SODIUM 40 MG: 40 TABLET, DELAYED RELEASE ORAL at 16:43

## 2023-10-21 RX ADMIN — ONDANSETRON 4 MG: 2 INJECTION INTRAMUSCULAR; INTRAVENOUS at 13:39

## 2023-10-21 RX ADMIN — HYDROCODONE BITARTRATE AND ACETAMINOPHEN 1 TABLET: 7.5; 325 TABLET ORAL at 11:35

## 2023-10-21 RX ADMIN — PHENYLEPHRINE HYDROCHLORIDE 200 MCG: 10 INJECTION INTRAVENOUS at 18:39

## 2023-10-21 RX ADMIN — PROPOFOL 140 MG: 10 INJECTION, EMULSION INTRAVENOUS at 18:24

## 2023-10-21 RX ADMIN — HYDROCODONE BITARTRATE AND ACETAMINOPHEN 1 TABLET: 7.5; 325 TABLET ORAL at 02:08

## 2023-10-21 RX ADMIN — ISOSORBIDE MONONITRATE 30 MG: 30 TABLET, EXTENDED RELEASE ORAL at 11:09

## 2023-10-21 RX ADMIN — SODIUM CHLORIDE, SODIUM LACTATE, POTASSIUM CHLORIDE, AND CALCIUM CHLORIDE: 600; 310; 30; 20 INJECTION, SOLUTION INTRAVENOUS at 18:18

## 2023-10-21 RX ADMIN — LIDOCAINE HYDROCHLORIDE 50 MG: 10 INJECTION, SOLUTION INFILTRATION; PERINEURAL at 18:23

## 2023-10-21 RX ADMIN — PROPOFOL 20 MCG/KG/MIN: 10 INJECTION, EMULSION INTRAVENOUS at 23:16

## 2023-10-21 RX ADMIN — CLOPIDOGREL BISULFATE 75 MG: 75 TABLET ORAL at 11:09

## 2023-10-21 RX ADMIN — ACETAMINOPHEN 650 MG: 325 TABLET ORAL at 11:09

## 2023-10-21 RX ADMIN — PIPERACILLIN AND TAZOBACTAM 4500 MG: 4; .5 INJECTION, POWDER, LYOPHILIZED, FOR SOLUTION INTRAVENOUS at 16:58

## 2023-10-21 RX ADMIN — PHENYLEPHRINE HYDROCHLORIDE 100 MCG: 10 INJECTION INTRAVENOUS at 18:31

## 2023-10-21 RX ADMIN — FAMOTIDINE 20 MG: 10 INJECTION, SOLUTION INTRAVENOUS at 18:18

## 2023-10-21 RX ADMIN — ROCURONIUM BROMIDE 30 MG: 10 INJECTION, SOLUTION INTRAVENOUS at 20:08

## 2023-10-21 RX ADMIN — ENOXAPARIN SODIUM 40 MG: 100 INJECTION SUBCUTANEOUS at 11:09

## 2023-10-21 RX ADMIN — ROCURONIUM BROMIDE 50 MG: 10 INJECTION, SOLUTION INTRAVENOUS at 20:29

## 2023-10-21 RX ADMIN — PHENYLEPHRINE HYDROCHLORIDE 100 MCG: 10 INJECTION INTRAVENOUS at 18:36

## 2023-10-21 RX ADMIN — IOPAMIDOL 75 ML: 755 INJECTION, SOLUTION INTRAVENOUS at 15:12

## 2023-10-21 RX ADMIN — AMLODIPINE BESYLATE 5 MG: 5 TABLET ORAL at 11:09

## 2023-10-21 RX ADMIN — POLYETHYLENE GLYCOL 3350 17 G: 17 POWDER, FOR SOLUTION ORAL at 11:09

## 2023-10-21 RX ADMIN — ASPIRIN 81 MG: 81 TABLET, CHEWABLE ORAL at 11:09

## 2023-10-21 RX ADMIN — FENTANYL CITRATE 150 MCG: 0.05 INJECTION, SOLUTION INTRAMUSCULAR; INTRAVENOUS at 20:30

## 2023-10-21 RX ADMIN — SODIUM CHLORIDE, SODIUM LACTATE, POTASSIUM CHLORIDE, AND CALCIUM CHLORIDE: 600; 310; 30; 20 INJECTION, SOLUTION INTRAVENOUS at 19:42

## 2023-10-21 RX ADMIN — METHOCARBAMOL 1000 MG: 500 TABLET ORAL at 13:33

## 2023-10-21 ASSESSMENT — PAIN SCALES - GENERAL
PAINLEVEL_OUTOF10: 5
PAINLEVEL_OUTOF10: 4
PAINLEVEL_OUTOF10: 7

## 2023-10-21 ASSESSMENT — PULMONARY FUNCTION TESTS
PIF_VALUE: 29
PIF_VALUE: 23
PIF_VALUE: 26
PIF_VALUE: 26
PIF_VALUE: 25
PIF_VALUE: 25
PIF_VALUE: 24
PIF_VALUE: 24
PIF_VALUE: 23
PIF_VALUE: 29
PIF_VALUE: 0

## 2023-10-21 ASSESSMENT — PAIN DESCRIPTION - DESCRIPTORS
DESCRIPTORS: ACHING
DESCRIPTORS: CRAMPING;DISCOMFORT;TIGHTNESS
DESCRIPTORS: ACHING

## 2023-10-21 ASSESSMENT — LIFESTYLE VARIABLES: SMOKING_STATUS: 1

## 2023-10-21 ASSESSMENT — PAIN DESCRIPTION - LOCATION
LOCATION: ABDOMEN

## 2023-10-21 ASSESSMENT — PAIN DESCRIPTION - ORIENTATION: ORIENTATION: MID;LOWER;LEFT;RIGHT

## 2023-10-21 NOTE — ANESTHESIA PRE PROCEDURE
01:23 AM    RBC 3.45 10/21/2023 01:23 AM    HGB 8.7 10/21/2023 01:23 AM    HGB 13.2 05/17/2012 01:10 PM    HCT 28.9 10/21/2023 01:23 AM    HCT 40.3 05/17/2012 01:10 PM    MCV 83.8 10/21/2023 01:23 AM    RDW 18.6 10/21/2023 01:23 AM     10/21/2023 01:23 AM     05/17/2012 01:10 PM       CMP:   Lab Results   Component Value Date/Time     10/21/2023 01:23 AM     05/17/2012 01:10 PM    K 3.3 10/21/2023 01:23 AM    CL 95 10/21/2023 01:23 AM     05/17/2012 01:10 PM    CO2 16 10/21/2023 01:23 AM    BUN 6 10/21/2023 01:23 AM    CREATININE 0.7 10/21/2023 01:23 AM    CREATININE 0.7 05/17/2012 01:10 PM    GFRAA 49 07/07/2022 08:47 AM    LABGLOM >60 10/21/2023 01:23 AM    GLUCOSE 79 10/21/2023 01:23 AM    PROT 6.0 10/09/2023 01:43 AM    PROT 7.9 08/20/2012 10:50 AM    CALCIUM 7.7 10/21/2023 01:23 AM    BILITOT <0.2 10/09/2023 01:43 AM    ALKPHOS 63 10/09/2023 01:43 AM    ALKPHOS 97 05/17/2012 01:10 PM    AST 15 10/09/2023 01:43 AM    ALT 7 10/09/2023 01:43 AM       POC Tests:   Recent Labs     10/21/23  1644   POCGLU 108*       Coags:   Lab Results   Component Value Date/Time    PROTIME 14.9 10/07/2023 12:05 PM    INR 1.20 10/07/2023 12:05 PM    APTT 31.8 10/07/2023 12:05 PM       HCG (If Applicable): No results found for: \"PREGTESTUR\", \"PREGSERUM\", \"HCG\", \"HCGQUANT\"     ABGs: No results found for: \"PHART\", \"PO2ART\", \"JVH5JTZ\", \"CGH4YVU\", \"BEART\", \"E6NXZJYI\"     Type & Screen (If Applicable):  No results found for: \"LABABO\", \"LABRH\"    Drug/Infectious Status (If Applicable):  No results found for: \"HIV\", \"HEPCAB\"    COVID-19 Screening (If Applicable):   Lab Results   Component Value Date/Time    COVID19 Not Detected 07/07/2022 09:00 AM    COVID19 Not Detected 11/09/2020 11:08 AM           Anesthesia Evaluation  Patient summary reviewed   history of anesthetic complications: PONV.   Airway: Mallampati: II  TM distance: >3 FB   Neck ROM: full  Mouth opening: > = 3 FB   Dental:    (+) edentulous

## 2023-10-22 VITALS
WEIGHT: 181.9 LBS | TEMPERATURE: 98.1 F | BODY MASS INDEX: 33.47 KG/M2 | HEART RATE: 100 BPM | HEIGHT: 62 IN | SYSTOLIC BLOOD PRESSURE: 95 MMHG | RESPIRATION RATE: 20 BRPM | OXYGEN SATURATION: 91 % | DIASTOLIC BLOOD PRESSURE: 43 MMHG

## 2023-10-22 LAB
ALBUMIN SERPL-MCNC: 1.8 G/DL (ref 3.5–5.2)
ANION GAP SERPL CALCULATED.3IONS-SCNC: 14 MMOL/L (ref 7–19)
BUN SERPL-MCNC: 9 MG/DL (ref 8–23)
CALCIUM SERPL-MCNC: 7 MG/DL (ref 8.8–10.2)
CHLORIDE SERPL-SCNC: 100 MMOL/L (ref 98–111)
CO2 SERPL-SCNC: 20 MMOL/L (ref 22–29)
CREAT SERPL-MCNC: 0.8 MG/DL (ref 0.5–0.9)
ERYTHROCYTE [DISTWIDTH] IN BLOOD BY AUTOMATED COUNT: 19.1 % (ref 11.5–14.5)
GLUCOSE SERPL-MCNC: 113 MG/DL (ref 74–109)
HCT VFR BLD AUTO: 22.9 % (ref 37–47)
HGB BLD-MCNC: 6.8 G/DL (ref 12–16)
MCH RBC QN AUTO: 25.3 PG (ref 27–31)
MCHC RBC AUTO-ENTMCNC: 29.7 G/DL (ref 33–37)
MCV RBC AUTO: 85.1 FL (ref 81–99)
PHOSPHATE SERPL-MCNC: 4 MG/DL (ref 2.5–4.5)
PLATELET # BLD AUTO: 401 K/UL (ref 130–400)
PMV BLD AUTO: 10.3 FL (ref 9.4–12.3)
POTASSIUM SERPL-SCNC: 3.6 MMOL/L (ref 3.5–5)
RBC # BLD AUTO: 2.69 M/UL (ref 4.2–5.4)
SODIUM SERPL-SCNC: 134 MMOL/L (ref 136–145)
WBC # BLD AUTO: 15 K/UL (ref 4.8–10.8)

## 2023-10-22 PROCEDURE — 36415 COLL VENOUS BLD VENIPUNCTURE: CPT

## 2023-10-22 ASSESSMENT — PULMONARY FUNCTION TESTS: PIF_VALUE: 9.1

## 2023-10-22 NOTE — DISCHARGE SUMMARY
Hospitalist Discharge Summary     Patient ID:  Harpal Luong  [id number]  28 y.o.  1950    Admit date: 10/6/2023    Discharge date and time: 23OCT25 @ 00:30     Admitting Physician: Ella Lezama MD     Discharge Physician: Sharifa Fuentes MD     Admission Diagnoses:   SBO (small bowel obstruction) ([address]) [K56.609]  Pulmonary nodule [R91.1]  REHANA (acute kidney injury) ([address]) [N17.9]  Acute cystitis without hematuria [N30.00]    Discharge Diagnoses:   SBO (small bowel obstruction) ([address]) [K56.609]  Pulmonary nodule [R91.1]  REHANA (acute kidney injury) ([address]) [N17.9]  Acute cystitis without hematuria [N30.00]    Admission Condition: fair    Discharged Condition: stable    Indication for Admission: Small Bowel Obstruction    Problem List:   Patient Active Problem List    Diagnosis Date Noted    Palliative care patient 10/12/2023    Sepsis ([address]) 10/07/2023    SBO (small bowel obstruction) ([address]) 10/06/2023    Pulmonary nodule 10/06/2023    Bowel perforation ([address]) 10/06/2023    Partial small bowel obstruction ([address]) 09/16/2021    Tobacco abuse 09/16/2021    Hyponatremia 09/16/2021    Leukocytosis 09/16/2021    SIRS (systemic inflammatory response syndrome) ([address]) 09/16/2021    Hyperkalemia 09/16/2021    Stage 3b chronic kidney disease (CKD) ([address]) 09/16/2021    REHANA (acute kidney injury) ([address]) 11/01/2020    Acute cystitis without hematuria 11/01/2020    Frequent falls 11/01/2020    Hypertension     Primary osteoarthritis of right knee 08/26/2016       Hospital Course:   HPI and Consultation Notes:  6OCT23: (HPI, copied from note by Mrs. Piercefoster Luly)  \"The patient is a 75 y.o. female with hyperlipidemia, HTN, anxiety, complaining of abdominal pain. Patient states that she began having sharp generalized abdominal pain today. She began having multiple nonbloody emesis and presented for further evaluation. Did have normal BM this morning. She denies fever, chills, shortness of breath or chest pain. Denies urinary symptoms.

## 2023-10-22 NOTE — OP NOTE
Operative Note      Patient: Sander Gomes  YOB: 1950  MRN: [id number]        DATE OF SERVICE: 10/12/2023    PREOPERATIVE DIAGNOSIS  Mechanical small bowel obstruction    POSTOPERATIVE DIAGNOSIS  1. Mechanical small bowel obstruction secondary to intra-abdominal adhesions. 2.  Incidental enterotomy    PROCEDURE  1. Exploratory laparotomy with extensive lysis of dense intra-abdominal adhesions  2. Repair of incidental enterotomy    SURGEON  Rosendo Villela MD     ASSISTANT  None    INDICATIONS  Ms. April Olmedo is a 76-year-old woman with an extensive history of prior abdominal surgery. She presented through the emergency department with nausea vomiting and abdominal pain. Work-up showed changes of small bowel obstruction. NG tube was placed and conservative management undertaken. Subsequent dye study shows a complete obstruction. Following review of options for care she is brought to the operating room for laparotomy and relief of the obstruction. PROCEDURE  Patient was taken to the main operating room, placed on the operating table supine. After the induction of adequate general endotracheal anesthesia the abdomen was prepped and draped to a sterile field. Time out was undertaken. Prior midline incision was re incised and carried through the subcutaneous tissue to the midline fascia. Fascia was incised and the abdominal cavity entered. We were met with omentum which was a densely adherent to the undersurface of the abdominal wall. I placed Kocher clamps on the fascia along the left side of the incision and elevated the edge. Using sharp dissection I took down the omental adhesions freeing it from the undersurface of the abdominal wall. I then went around to the other side of the table and in similar fashion took down the adhesions to the right side of the abdominal wall. I then went back to the patient's right side.   I began dissecting the omentum up off the underlying bowel and
continued to dissect the abdominal wall off the underlying fascia. Copious warm saline was again used to lift the omentum off the underlying small bowel. All of the tissue was extremely inflamed and friable with oozing from the patient's continued plavix use. There were pockets of clotted blood, and along the left side of the abdomen, succus was encountered. The area of the previously repaired enterotomy was visualized with interrupted silk stitches running approximately the width of the small bowel. There was an opening in the center of this about 1 cm in diameter with succus draining. This was tacked closed with silk stitches. Again, due to the friability of the inflamed tissue, the stitches did pull through and leak. A figure of eight was placed and this did limit leakage form the opening. Suture repair was briefly considered, but due to that already failing previously and these sutures pulling through, it was decided to proceed with resection of this area. Further warm saline was used to very gently hydro dissect the loop of bowel that included the area of the enterotomy. The involved loop of bowel was very thickened and inflamed. A length of about 8 inches of bowel was freed proximal and distal to the enterotomy. The least inflamed areas were chosen for division. The mesentery was very thickened. A window was created in the mesentery proximal and distal to the area of leakage, the 75 ZULEMA stapler was fired across the bowel at both of these locations and the bowel divided. The large jaw ligasure was used to divide the mesentery between these two locations. The antimesenteric corners of the staple lines were resected and the stapler was placed down each lumen. Due to the loop of bowel still being tethered, only about 2/3s of the stapler was able to be inserted. The stapler was fired. The common enterotomy was then grasped closed with the allis clamps.  The TA 60 blue stapler was fired under these clamps and

## 2023-10-22 NOTE — BRIEF OP NOTE
Brief Postoperative Note      Patient: Alec Armando  YOB: 1950  MRN: [id number]    Date of Procedure: 10/21/2023    Pre-Op Diagnosis Codes: * Bowel perforation ([address]) [K63.1]    Post-Op Diagnosis: Same       Procedure(s):  LAPAROTOMY EXPLORATORY; SMALL BOWEL RESECTION; APPLICATION OF ABTHERA WOUND VAC    Surgeon(s):  Guadalupe Burton MD    Assistant:  * No surgical staff found *    Anesthesia: General    Estimated Blood Loss (mL): 318     Complications: None    Specimens:   ID Type Source Tests Collected by Time Destination   A : SMALL BOWEL Tissue Jejunum SURGICAL PATHOLOGY Guadalupe Burton MD 10/21/2023 2036        Implants:  * No implants in log *      Drains:   Closed/Suction Drain Left LLQ Bulb (Active)       Negative Pressure Wound Therapy Abdomen Medial;Upper (Active)       NG/OG/NJ/NE Tube Nasogastric 18 fr Left nostril (Active)       Urinary Catheter 10/21/23 2 Way (Active)       [REMOVED] NG/OG/NJ/NE Tube Nasogastric 18 fr Left nostril (Removed)   Surrounding Skin Clean, dry & intact 10/16/23 0809   Securement device Tape 10/16/23 0809   Status Clamped 10/16/23 0809   Placement Verified Gastric Contents 10/15/23 0618   NG/OG/NJ/NE External Measurement (cm) 150 cm 10/15/23 0618   Drainage Appearance Green;Brown 10/15/23 0618   Tube Feeding Standard with Fiber 10/15/23 0618   Free Water/Flush (mL) 30 mL 10/13/23 0800   Output (mL) 50 ml 10/14/23 1405       [REMOVED] Urinary Catheter 10/10/23 (Removed)   Catheter Indications Need for fluid volume management of the critically ill patient in a critical care setting 10/14/23 0800   Site Assessment No urethral drainage 10/13/23 2000   Urine Color Yellow 10/14/23 0800   Urine Appearance Clear 10/13/23 2000   Collection Container Standard 10/13/23 2000   Securement Method Securing device (Describe) 10/13/23 2000   Catheter Care  Perineal wipes 10/13/23 2000   Catheter Best Practices  Drainage tube clipped to bed;Catheter secured to thigh; Tamper

## 2023-10-23 NOTE — CONSULTS
Cardiac Rehab MI/PTCA/Stent education packet was sent to the patient's address on record. Handouts titled; \"Home Instructions Following a Cardiac Event\", \"A Healthy Heart: Care Instructions\",  \"Cardiac Diet/Low Cholesterol\", \"Cardiac Rehabilitation: An Individualized Supervised Program For You\" and a [facility]iac [facility] Pulmonary Rehabilitation brochure were included. Patient was instructed to contact [facility] or the hospital nearest their residence for the opportunity to enroll in Phase II Outpatient Cardiac Rehab.

## 2023-11-14 ENCOUNTER — TELEPHONE (OUTPATIENT)
Dept: CARDIOLOGY CLINIC | Age: 73
End: 2023-11-14

## (undated) DEVICE — RELOAD STPL L75MM OPN H3.8MM CLS 1.5MM WIRE DIA0.2MM REG

## (undated) DEVICE — SUTURE VCRL SZ 3-0 L18IN ABSRB UD L26MM SH 1/2 CIR J864D

## (undated) DEVICE — LARYNGOSCOPE BLDE MAC HNDL M SZ 35 ST CURAPLEX CURAVIEW LED

## (undated) DEVICE — THE CHANNEL CLEANING BRUSH IS A NYLON FLEXI BRUSH ATTACHED TO A FLEXIBLE PLASTIC SHEATH DESIGNED TO SAFELY REMOVE DEBRIS FROM FLEXIBLE ENDOSCOPES.

## (undated) DEVICE — KIT DSG ABTHERA SENSATRAC

## (undated) DEVICE — YANKAUER,TAPERED BULBOUS TIP,W/O VENT: Brand: MEDLINE

## (undated) DEVICE — SYRINGE IRRIG 60ML SFT PLIABLE BLB EZ TO GRP 1 HND USE W/

## (undated) DEVICE — SUTURE PERMAHAND SZ 0 L30IN NONABSORBABLE BLK SILK BRAID A306H

## (undated) DEVICE — TUBE ET 7MM NSL ORAL BASIC CUF INTMED MURPHY EYE RADPQ MRK

## (undated) DEVICE — SUTURE VCRL SZ 3-0 L27IN ABSRB UD L26MM SH 1/2 CIR J416H

## (undated) DEVICE — TBG SMPL FLTR LINE NASL 02/C02 A/ BX/100

## (undated) DEVICE — STAPLER INT L75MM CUT LN L73MM STPL LN L77MM BLU B FRM 8

## (undated) DEVICE — DRAPE SLUSH DISC W44XL66IN ST FOR RND BSIN HUSH SLUSH SYS

## (undated) DEVICE — SUTURE PERMAHAND SZ 3-0 L18IN NONABSORBABLE BLK L26MM SH C013D

## (undated) DEVICE — SOLUTION IRRIG 1000ML 0.9% SOD CHL USP POUR PLAS BTL

## (undated) DEVICE — SOLUTION IV IRRIG POUR BRL 0.9% SODIUM CHL 2F7124

## (undated) DEVICE — GLOVE SURG SZ 7 CRM LTX FREE POLYISOPRENE POLYMER BEAD ANTI

## (undated) DEVICE — STAPLER INT L60MM REG TISS BLU B FRM 8 FIRING 2 ROW AUTO

## (undated) DEVICE — Device: Brand: DEFENDO AIR/WATER/SUCTION AND BIOPSY VALVE

## (undated) DEVICE — SENSR O2 OXIMAX FNGR A/ 18IN NONSTR

## (undated) DEVICE — TOWEL,OR,DSP,ST,BLUE,DLX,4/PK,20PK/CS: Brand: MEDLINE

## (undated) DEVICE — SUTURE PDS + SZ 1 L96IN ABSRB VLT L65MM TP-1 1/2 CIR PDP880G

## (undated) DEVICE — RELOAD STPL H1.5X3.6XL60MM REG TISS BLU B FRM AUTO RET PIN

## (undated) DEVICE — DRAPE,UTILITY,XL,4/PK,STERILE: Brand: MEDLINE

## (undated) DEVICE — GLOVE SURG SZ 75 CRM LTX FREE POLYISOPRENE POLYMER BEAD ANTI

## (undated) DEVICE — YANKAUER,POOLE TIP,STERILE,50/CS: Brand: MEDLINE

## (undated) DEVICE — MAJOR CDS

## (undated) DEVICE — MSK O2 MD CONCENTR A/ LF 7FT 1P/U

## (undated) DEVICE — NEPTUNE E-SEP SMOKE EVACUATION PENCIL, COATED, 70MM BLADE, ROCKER SWITCH: Brand: NEPTUNE E-SEP

## (undated) DEVICE — ENDOGATOR AUXILIARY WATER JET CONNECTOR: Brand: ENDOGATOR

## (undated) DEVICE — VERESS PNEUMOPERITONEUM NEEDLE: Brand: N.A.

## (undated) DEVICE — THE SINGLE USE ETRAP – POLYP TRAP IS USED FOR SUCTION RETRIEVAL OF ENDOSCOPICALLY REMOVED POLYPS.: Brand: ETRAP

## (undated) DEVICE — BLADE LARYNSCP HNDL MAC 3 DISP CURAVIEW LED

## (undated) DEVICE — TIBURON LAPAROTOMY DRAPE: Brand: CONVERTORS

## (undated) DEVICE — SNAR POLYP CAPTIVATOR MICROHEX 13 240CM

## (undated) DEVICE — GOWN, ORBIS, XLNG/XXLARGE, STRL: Brand: MEDLINE

## (undated) DEVICE — SUTURE VCRL SZ 0 L54IN ABSRB UD LIGAPAK REEL NDL J287G